# Patient Record
Sex: FEMALE | Race: OTHER | NOT HISPANIC OR LATINO | Employment: UNEMPLOYED | URBAN - METROPOLITAN AREA
[De-identification: names, ages, dates, MRNs, and addresses within clinical notes are randomized per-mention and may not be internally consistent; named-entity substitution may affect disease eponyms.]

---

## 2022-07-20 DIAGNOSIS — M25.552 LEFT HIP PAIN: Primary | ICD-10-CM

## 2022-07-22 ENCOUNTER — HOSPITAL ENCOUNTER (OUTPATIENT)
Dept: RADIOLOGY | Facility: HOSPITAL | Age: 52
Discharge: HOME | End: 2022-07-22
Attending: NURSE PRACTITIONER
Payer: MEDICAID

## 2022-07-22 DIAGNOSIS — M25.552 LEFT HIP PAIN: ICD-10-CM

## 2022-07-22 PROCEDURE — 73700 CT LOWER EXTREMITY W/O DYE: CPT | Mod: TC,LT

## 2023-04-02 ENCOUNTER — HOSPITAL ENCOUNTER (EMERGENCY)
Facility: HOSPITAL | Age: 53
Discharge: HOME | End: 2023-04-02
Attending: EMERGENCY MEDICINE
Payer: MEDICAID

## 2023-04-02 VITALS
BODY MASS INDEX: 29.23 KG/M2 | RESPIRATION RATE: 17 BRPM | HEIGHT: 63 IN | SYSTOLIC BLOOD PRESSURE: 116 MMHG | DIASTOLIC BLOOD PRESSURE: 69 MMHG | TEMPERATURE: 99 F | OXYGEN SATURATION: 90 % | HEART RATE: 66 BPM | WEIGHT: 165 LBS

## 2023-04-02 DIAGNOSIS — J18.9 COMMUNITY ACQUIRED PNEUMONIA, UNSPECIFIED LATERALITY: ICD-10-CM

## 2023-04-02 DIAGNOSIS — J44.1 COPD WITH ACUTE EXACERBATION: Primary | ICD-10-CM

## 2023-04-02 DIAGNOSIS — R06.02 SHORTNESS OF BREATH: ICD-10-CM

## 2023-04-02 LAB
ALBUMIN SERPL-MCNC: 3 G/DL (ref 3.5–5)
ALBUMIN/GLOB SERPL: 0.7 RATIO (ref 1.1–2)
ALP SERPL-CCNC: 142 UNIT/L (ref 40–150)
ALT SERPL-CCNC: 17 UNIT/L (ref 0–55)
AST SERPL-CCNC: 16 UNIT/L (ref 5–34)
BASOPHILS # BLD AUTO: 0.07 X10(3)/MCL (ref 0–0.2)
BASOPHILS NFR BLD AUTO: 0.6 %
BILIRUBIN DIRECT+TOT PNL SERPL-MCNC: 0.3 MG/DL
BUN SERPL-MCNC: 16 MG/DL (ref 9.8–20.1)
CALCIUM SERPL-MCNC: 9.6 MG/DL (ref 8.4–10.2)
CHLORIDE SERPL-SCNC: 102 MMOL/L (ref 98–107)
CO2 SERPL-SCNC: 26 MMOL/L (ref 22–29)
CREAT SERPL-MCNC: 0.8 MG/DL (ref 0.55–1.02)
EOSINOPHIL # BLD AUTO: 0.09 X10(3)/MCL (ref 0–0.9)
EOSINOPHIL NFR BLD AUTO: 0.8 %
ERYTHROCYTE [DISTWIDTH] IN BLOOD BY AUTOMATED COUNT: 14.4 % (ref 11.5–17)
FLUAV AG UPPER RESP QL IA.RAPID: NOT DETECTED
FLUBV AG UPPER RESP QL IA.RAPID: NOT DETECTED
GFR SERPLBLD CREATININE-BSD FMLA CKD-EPI: >60 MLS/MIN/1.73/M2
GLOBULIN SER-MCNC: 4.6 GM/DL (ref 2.4–3.5)
GLUCOSE SERPL-MCNC: 113 MG/DL (ref 74–100)
HCT VFR BLD AUTO: 34.7 % (ref 37–47)
HGB BLD-MCNC: 12.4 G/DL (ref 12–16)
IMM GRANULOCYTES # BLD AUTO: 0.05 X10(3)/MCL (ref 0–0.04)
IMM GRANULOCYTES NFR BLD AUTO: 0.5 %
LYMPHOCYTES # BLD AUTO: 2.35 X10(3)/MCL (ref 0.6–4.6)
LYMPHOCYTES NFR BLD AUTO: 21.8 %
MCH RBC QN AUTO: 32 PG (ref 27–31)
MCHC RBC AUTO-ENTMCNC: 35.7 G/DL (ref 33–36)
MCV RBC AUTO: 89.7 FL (ref 80–94)
MONOCYTES # BLD AUTO: 0.65 X10(3)/MCL (ref 0.1–1.3)
MONOCYTES NFR BLD AUTO: 6 %
NEUTROPHILS # BLD AUTO: 7.57 X10(3)/MCL (ref 2.1–9.2)
NEUTROPHILS NFR BLD AUTO: 70.3 %
PLATELET # BLD AUTO: 436 X10(3)/MCL (ref 130–400)
PMV BLD AUTO: 9 FL (ref 7.4–10.4)
POTASSIUM SERPL-SCNC: 3.1 MMOL/L (ref 3.5–5.1)
PROT SERPL-MCNC: 7.6 GM/DL (ref 6.4–8.3)
RBC # BLD AUTO: 3.87 X10(6)/MCL (ref 4.2–5.4)
SARS-COV-2 RNA RESP QL NAA+PROBE: NOT DETECTED
SODIUM SERPL-SCNC: 140 MMOL/L (ref 136–145)
TROPONIN I SERPL-MCNC: <0.01 NG/ML (ref 0–0.04)
WBC # SPEC AUTO: 10.8 X10(3)/MCL (ref 4.5–11.5)

## 2023-04-02 PROCEDURE — 93010 ELECTROCARDIOGRAM REPORT: CPT | Mod: ,,, | Performed by: STUDENT IN AN ORGANIZED HEALTH CARE EDUCATION/TRAINING PROGRAM

## 2023-04-02 PROCEDURE — 85025 COMPLETE CBC W/AUTO DIFF WBC: CPT | Performed by: PHYSICIAN ASSISTANT

## 2023-04-02 PROCEDURE — 84484 ASSAY OF TROPONIN QUANT: CPT | Performed by: PHYSICIAN ASSISTANT

## 2023-04-02 PROCEDURE — 94640 AIRWAY INHALATION TREATMENT: CPT

## 2023-04-02 PROCEDURE — 63600175 PHARM REV CODE 636 W HCPCS: Performed by: PHYSICIAN ASSISTANT

## 2023-04-02 PROCEDURE — 25000242 PHARM REV CODE 250 ALT 637 W/ HCPCS: Performed by: PHYSICIAN ASSISTANT

## 2023-04-02 PROCEDURE — 94761 N-INVAS EAR/PLS OXIMETRY MLT: CPT

## 2023-04-02 PROCEDURE — 93010 EKG 12-LEAD: ICD-10-PCS | Mod: ,,, | Performed by: STUDENT IN AN ORGANIZED HEALTH CARE EDUCATION/TRAINING PROGRAM

## 2023-04-02 PROCEDURE — 0240U COVID/FLU A&B PCR: CPT | Performed by: PHYSICIAN ASSISTANT

## 2023-04-02 PROCEDURE — 80053 COMPREHEN METABOLIC PANEL: CPT | Performed by: PHYSICIAN ASSISTANT

## 2023-04-02 PROCEDURE — 93005 ELECTROCARDIOGRAM TRACING: CPT

## 2023-04-02 PROCEDURE — 96374 THER/PROPH/DIAG INJ IV PUSH: CPT

## 2023-04-02 PROCEDURE — 99285 EMERGENCY DEPT VISIT HI MDM: CPT | Mod: 25

## 2023-04-02 RX ORDER — METHYLPREDNISOLONE SOD SUCC 125 MG
125 VIAL (EA) INJECTION
Status: COMPLETED | OUTPATIENT
Start: 2023-04-02 | End: 2023-04-02

## 2023-04-02 RX ORDER — DOXYCYCLINE 100 MG/1
100 CAPSULE ORAL 2 TIMES DAILY
Qty: 14 CAPSULE | Refills: 0 | Status: SHIPPED | OUTPATIENT
Start: 2023-04-02 | End: 2023-04-09

## 2023-04-02 RX ORDER — PROMETHAZINE HYDROCHLORIDE AND DEXTROMETHORPHAN HYDROBROMIDE 6.25; 15 MG/5ML; MG/5ML
5 SYRUP ORAL EVERY 6 HOURS PRN
Qty: 118 ML | Refills: 0 | Status: SHIPPED | OUTPATIENT
Start: 2023-04-02 | End: 2023-04-12

## 2023-04-02 RX ORDER — IPRATROPIUM BROMIDE AND ALBUTEROL SULFATE 2.5; .5 MG/3ML; MG/3ML
3 SOLUTION RESPIRATORY (INHALATION)
Status: COMPLETED | OUTPATIENT
Start: 2023-04-02 | End: 2023-04-02

## 2023-04-02 RX ADMIN — METHYLPREDNISOLONE SODIUM SUCCINATE 125 MG: 125 INJECTION, POWDER, FOR SOLUTION INTRAMUSCULAR; INTRAVENOUS at 12:04

## 2023-04-02 RX ADMIN — IPRATROPIUM BROMIDE AND ALBUTEROL SULFATE 3 ML: .5; 3 SOLUTION RESPIRATORY (INHALATION) at 12:04

## 2023-04-02 NOTE — DISCHARGE INSTRUCTIONS
Drink plenty of fluids. Take full course of antibiotics. Use cough medication as needed. Use home inhalers.

## 2023-04-02 NOTE — ED PROVIDER NOTES
Encounter Date: 4/2/2023       History     Chief Complaint   Patient presents with    Cough     Reports sob, dizziness, decreased appetite for last several days.  Cough for couple weeks. Denies chest pain. States she is having increased anxiety.  1/2ppd smoker.  Takes medication for anxiety.      Shortness of Breath    Dizziness     52-year-old female with history of COPD presents to ED for evaluation shortness of breath and wheezing.  Patient reports that she has cough and congestion for the past 2 weeks.  Has home inhalers but did not use this morning.  Denies any fever.  No mucus production.  Denies any chest pain. States this feels like previous COPD exacerbation.    The history is provided by the patient. No  was used.   Review of patient's allergies indicates:   Allergen Reactions    Codeine Rash     Past Medical History:   Diagnosis Date    COPD (chronic obstructive pulmonary disease)     Hypertension      History reviewed. No pertinent surgical history.  History reviewed. No pertinent family history.     Review of Systems   Constitutional:  Negative for chills, fatigue and fever.   HENT:  Positive for congestion. Negative for sore throat.    Respiratory:  Positive for cough, shortness of breath and wheezing.    Cardiovascular:  Negative for chest pain, palpitations and leg swelling.   Gastrointestinal:  Negative for abdominal pain, diarrhea, nausea and vomiting.   Genitourinary: Negative.    Musculoskeletal: Negative.    Neurological:  Negative for dizziness and light-headedness.   All other systems reviewed and are negative.    Physical Exam     Initial Vitals [04/02/23 1059]   BP Pulse Resp Temp SpO2   136/82 88 18 98.6 °F (37 °C) (!) 94 %      MAP       --         Physical Exam    Vitals reviewed.  Constitutional: She appears well-developed.   HENT:   Head: Normocephalic and atraumatic.   Right Ear: Tympanic membrane and external ear normal.   Left Ear: Tympanic membrane and external  ear normal.   Mouth/Throat: Uvula is midline, oropharynx is clear and moist and mucous membranes are normal. No trismus in the jaw. No uvula swelling. No oropharyngeal exudate, posterior oropharyngeal edema or posterior oropharyngeal erythema.   Eyes: Conjunctivae are normal. Pupils are equal, round, and reactive to light.   Neck: Neck supple.   Normal range of motion.  Cardiovascular:  Normal rate, regular rhythm and normal heart sounds.           Pulmonary/Chest: Breath sounds normal. She has no wheezes. She has no rhonchi. She has no rales.   Bilateral expiratory wheezing noted.  No retractions, tachypnea or respiratory distress noted   Abdominal: Abdomen is soft. Bowel sounds are normal. There is no abdominal tenderness. There is no rebound and no guarding.   Musculoskeletal:         General: Normal range of motion.      Cervical back: Normal range of motion and neck supple.     Neurological: She is alert and oriented to person, place, and time.   Skin: Skin is warm and dry.   Psychiatric: She has a normal mood and affect.       ED Course   Procedures  Labs Reviewed   COMPREHENSIVE METABOLIC PANEL - Abnormal; Notable for the following components:       Result Value    Potassium Level 3.1 (*)     Glucose Level 113 (*)     Albumin Level 3.0 (*)     Globulin 4.6 (*)     Albumin/Globulin Ratio 0.7 (*)     All other components within normal limits   CBC WITH DIFFERENTIAL - Abnormal; Notable for the following components:    RBC 3.87 (*)     Hct 34.7 (*)     MCH 32.0 (*)     Platelet 436 (*)     IG# 0.05 (*)     All other components within normal limits   COVID/FLU A&B PCR - Normal    Narrative:     The Xpert Xpress SARS-CoV-2/FLU/RSV plus is a rapid, multiplexed real-time PCR test intended for the simultaneous qualitative detection and differentiation of SARS-CoV-2, Influenza A, Influenza B, and respiratory syncytial virus (RSV) viral RNA in either nasopharyngeal swab or nasal swab specimens.         TROPONIN I -  Normal   CBC W/ AUTO DIFFERENTIAL    Narrative:     The following orders were created for panel order CBC auto differential.  Procedure                               Abnormality         Status                     ---------                               -----------         ------                     CBC with Differential[[id number]]        Abnormal            Final result                 Please view results for these tests on the individual orders.     EKG Readings: (Independently Interpreted)   Initial Reading: No STEMI. Rhythm: Normal Sinus Rhythm. Heart Rate: 77. Ectopy: No Ectopy. Conduction: RBBB. ST Segments: Normal ST Segments. T Waves: Normal. Clinical Impression: Normal Sinus Rhythm with RBBB Other Impression: NSR with RBBB at rate of 77, no stemi noted     Imaging Results              X-Ray Chest PA And Lateral (Final result)  Result time 04/02/23 14:16:28      Final result by Nathan Beltrán MD (04/02/23 14:16:28)                   Impression:      Right middle lobe opacities suspicious for pneumonia.  Recommend follow-up PA and lateral radiographs in 4-6 weeks to document resolution.      Electronically signed by: Nathan Beltrán  Date:    04/02/2023  Time:    14:16               Narrative:    EXAMINATION:  XR CHEST PA AND LATERAL    CLINICAL HISTORY:  Shortness of breath    COMPARISON:  5 February 2020    FINDINGS:  PA and lateral views of the chest were obtained. The heart is not significantly enlarged.  Some right middle lobe opacities are new.  No pneumothorax or significant pleural effusion.                                       Medications   methylPREDNISolone sodium succinate injection 125 mg (125 mg Intravenous Given 4/2/23 1228)   albuterol-ipratropium 2.5 mg-0.5 mg/3 mL nebulizer solution 3 mL (3 mLs Nebulization Given 4/2/23 1219)     Medical Decision Making:   Initial Assessment:   52-year-old female with history of COPD presents to ED for evaluation shortness of breath and wheezing.  Patient  reports that she has cough and congestion for the past 2 weeks.  Has home inhalers but did not use this morning.  Denies any fever.  No mucus production.  Denies any chest pain. States this feels like previous COPD exacerbation.  Differential Diagnosis:   Shortness of breath, COPD exacerbation, wheezing, viral infection, pneumonia, CAD, STEMI/NSTEMI  Independently Interpreted Test(s):   I have ordered and independently interpreted EKG Reading(s) - see prior notes  Clinical Tests:   Lab Tests: Ordered and Reviewed  The following lab test(s) were unremarkable: CBC, BMP and Troponin  Radiological Study: Reviewed and Ordered  Medical Tests: Ordered and Reviewed  ED Management:  Patient afebrile and in NAD. Mild wheezing noted on exam. Patient feeling better after duoneb and solumedrol. Patient has had cough the for the past 2 weeks. Will place on doxycyline to cover for COPD exacerbation lasting 2 weeks as well as right lower lobe pneumonia. Discussed using home nebulizer as needed. Patient O2 of 95%, not hypoxic.  Return ED precautions given.  I provided counseling to patient with regard to condition, the treatment plan, specific conditions for return, and the importance of follow up. Detailed written and verbal instructions provided to patient and she expressed a verbal understanding of the discharge instructions and overall management plan. Reiterated the importance of medication administration and safety. Advised patient to follow up with primary care provider in 3-5 days or sooner if needed.  Answered questions at this time. The patient is stable for discharge.              ED Course as of 04/02/23 1422   Sun Apr 02, 2023   1338 Patient feeling better after duoneb and solumedrol [SL]      ED Course User Index  [SL] LINDA Hylton                 Clinical Impression:   Final diagnoses:  [R06.02] Shortness of breath  [J44.1] COPD with acute exacerbation (Primary)  [J18.9] Community acquired pneumonia, unspecified  laterality        ED Disposition Condition    Discharge Stable          ED Prescriptions       Medication Sig Dispense Start Date End Date Auth. Provider    doxycycline (VIBRAMYCIN) 100 MG Cap Take 1 capsule (100 mg total) by mouth 2 (two) times daily. for 7 days 14 capsule 4/2/2023 4/9/2023 LINDA Hylton    promethazine-dextromethorphan (PROMETHAZINE-DM) 6.25-15 mg/5 mL Syrp Take 5 mLs by mouth every 6 (six) hours as needed. 118 mL 4/2/2023 4/12/2023 LINDA Hylton          Follow-up Information       Follow up With Specialties Details Why Contact Info    Barry López III, APRN-CNP Family Medicine   [address] [facility] [address] [phone]               LINDA Hylton  04/02/23 1424       LINDA Hylton  04/11/23 3567

## 2023-08-02 ENCOUNTER — HOSPITAL ENCOUNTER (OUTPATIENT)
Dept: RADIOLOGY | Facility: HOSPITAL | Age: 53
Discharge: HOME | End: 2023-08-02
Payer: MEDICAID

## 2023-08-02 DIAGNOSIS — Z12.31 ENCOUNTER FOR SCREENING MAMMOGRAM FOR BREAST CANCER: ICD-10-CM

## 2023-08-02 PROCEDURE — 77067 SCR MAMMO BI INCL CAD: CPT | Mod: TC

## 2023-08-02 PROCEDURE — 77067 SCR MAMMO BI INCL CAD: CPT | Mod: 26,,, | Performed by: STUDENT IN AN ORGANIZED HEALTH CARE EDUCATION/TRAINING PROGRAM

## 2023-08-02 PROCEDURE — 77063 MAMMO DIGITAL SCREENING BILAT WITH TOMO: ICD-10-PCS | Mod: 26,,, | Performed by: STUDENT IN AN ORGANIZED HEALTH CARE EDUCATION/TRAINING PROGRAM

## 2023-08-02 PROCEDURE — 77063 BREAST TOMOSYNTHESIS BI: CPT | Mod: 26,,, | Performed by: STUDENT IN AN ORGANIZED HEALTH CARE EDUCATION/TRAINING PROGRAM

## 2023-08-02 PROCEDURE — 77067 MAMMO DIGITAL SCREENING BILAT WITH TOMO: ICD-10-PCS | Mod: 26,,, | Performed by: STUDENT IN AN ORGANIZED HEALTH CARE EDUCATION/TRAINING PROGRAM

## 2023-08-08 ENCOUNTER — HOSPITAL ENCOUNTER (OUTPATIENT)
Dept: RADIOLOGY | Facility: HOSPITAL | Age: 53
Discharge: HOME | End: 2023-08-08
Attending: NURSE PRACTITIONER
Payer: MEDICAID

## 2023-08-08 DIAGNOSIS — R92.8 ABNORMAL MAMMOGRAM: ICD-10-CM

## 2023-08-08 PROCEDURE — 76642 ULTRASOUND BREAST LIMITED: CPT | Mod: TC,RT

## 2023-08-08 PROCEDURE — 76642 ULTRASOUND BREAST LIMITED: CPT | Mod: 26,RT,, | Performed by: STUDENT IN AN ORGANIZED HEALTH CARE EDUCATION/TRAINING PROGRAM

## 2023-08-08 PROCEDURE — 77065 DX MAMMO INCL CAD UNI: CPT | Mod: 26,RT,, | Performed by: STUDENT IN AN ORGANIZED HEALTH CARE EDUCATION/TRAINING PROGRAM

## 2023-08-08 PROCEDURE — 77061 MAMMO DIGITAL DIAGNOSTIC RIGHT WITH TOMO: ICD-10-PCS | Mod: 26,RT,, | Performed by: STUDENT IN AN ORGANIZED HEALTH CARE EDUCATION/TRAINING PROGRAM

## 2023-08-08 PROCEDURE — 77061 BREAST TOMOSYNTHESIS UNI: CPT | Mod: TC,RT

## 2023-08-08 PROCEDURE — 77065 MAMMO DIGITAL DIAGNOSTIC RIGHT WITH TOMO: ICD-10-PCS | Mod: 26,RT,, | Performed by: STUDENT IN AN ORGANIZED HEALTH CARE EDUCATION/TRAINING PROGRAM

## 2023-08-08 PROCEDURE — 77061 BREAST TOMOSYNTHESIS UNI: CPT | Mod: 26,RT,, | Performed by: STUDENT IN AN ORGANIZED HEALTH CARE EDUCATION/TRAINING PROGRAM

## 2023-08-08 PROCEDURE — 76642 US BREAST RIGHT LIMITED: ICD-10-PCS | Mod: 26,RT,, | Performed by: STUDENT IN AN ORGANIZED HEALTH CARE EDUCATION/TRAINING PROGRAM

## 2023-10-24 ENCOUNTER — HOSPITAL ENCOUNTER (OUTPATIENT)
Dept: RADIOLOGY | Facility: HOSPITAL | Age: 53
Discharge: HOME | End: 2023-10-24
Attending: NURSE PRACTITIONER
Payer: MEDICAID

## 2023-10-24 DIAGNOSIS — R10.9 ABDOMINAL PAIN: ICD-10-CM

## 2023-10-24 PROCEDURE — 74019 RADEX ABDOMEN 2 VIEWS: CPT | Mod: TC

## 2023-12-25 ENCOUNTER — HOSPITAL ENCOUNTER (EMERGENCY)
Facility: HOSPITAL | Age: 53
Discharge: HOME | End: 2023-12-25
Attending: EMERGENCY MEDICINE
Payer: MEDICAID

## 2023-12-25 VITALS
WEIGHT: 189.63 LBS | OXYGEN SATURATION: 98 % | HEIGHT: 64 IN | HEART RATE: 88 BPM | BODY MASS INDEX: 32.37 KG/M2 | RESPIRATION RATE: 18 BRPM | DIASTOLIC BLOOD PRESSURE: 79 MMHG | TEMPERATURE: 99 F | SYSTOLIC BLOOD PRESSURE: 121 MMHG

## 2023-12-25 DIAGNOSIS — R10.13 EPIGASTRIC PAIN: ICD-10-CM

## 2023-12-25 DIAGNOSIS — J10.1 INFLUENZA A: Primary | ICD-10-CM

## 2023-12-25 LAB
ALBUMIN SERPL-MCNC: 3.4 G/DL (ref 3.5–5)
ALBUMIN/GLOB SERPL: 1 RATIO (ref 1.1–2)
ALP SERPL-CCNC: 83 UNIT/L (ref 40–150)
ALT SERPL-CCNC: 14 UNIT/L (ref 0–55)
AST SERPL-CCNC: 13 UNIT/L (ref 5–34)
BASOPHILS # BLD AUTO: 0.07 X10(3)/MCL
BASOPHILS NFR BLD AUTO: 1 %
BILIRUB SERPL-MCNC: 0.3 MG/DL
BUN SERPL-MCNC: 13 MG/DL (ref 9.8–20.1)
CALCIUM SERPL-MCNC: 8.6 MG/DL (ref 8.4–10.2)
CHLORIDE SERPL-SCNC: 106 MMOL/L (ref 98–107)
CO2 SERPL-SCNC: 23 MMOL/L (ref 22–29)
CREAT SERPL-MCNC: 0.86 MG/DL (ref 0.55–1.02)
EOSINOPHIL # BLD AUTO: 0.04 X10(3)/MCL (ref 0–0.9)
EOSINOPHIL NFR BLD AUTO: 0.6 %
ERYTHROCYTE [DISTWIDTH] IN BLOOD BY AUTOMATED COUNT: 14.2 % (ref 11.5–17)
FLUAV AG UPPER RESP QL IA.RAPID: DETECTED
FLUBV AG UPPER RESP QL IA.RAPID: NOT DETECTED
GFR SERPLBLD CREATININE-BSD FMLA CKD-EPI: >60 MLS/MIN/1.73/M2
GLOBULIN SER-MCNC: 3.5 GM/DL (ref 2.4–3.5)
GLUCOSE SERPL-MCNC: 89 MG/DL (ref 74–100)
HCT VFR BLD AUTO: 37.1 % (ref 37–47)
HGB BLD-MCNC: 12.3 G/DL (ref 12–16)
IMM GRANULOCYTES # BLD AUTO: 0.03 X10(3)/MCL (ref 0–0.04)
IMM GRANULOCYTES NFR BLD AUTO: 0.4 %
LIPASE SERPL-CCNC: 11 U/L
LYMPHOCYTES # BLD AUTO: 0.9 X10(3)/MCL (ref 0.6–4.6)
LYMPHOCYTES NFR BLD AUTO: 13.2 %
MCH RBC QN AUTO: 29.7 PG (ref 27–31)
MCHC RBC AUTO-ENTMCNC: 33.2 G/DL (ref 33–36)
MCV RBC AUTO: 89.6 FL (ref 80–94)
MONOCYTES # BLD AUTO: 0.44 X10(3)/MCL (ref 0.1–1.3)
MONOCYTES NFR BLD AUTO: 6.5 %
NEUTROPHILS # BLD AUTO: 5.33 X10(3)/MCL (ref 2.1–9.2)
NEUTROPHILS NFR BLD AUTO: 78.3 %
PLATELET # BLD AUTO: 273 X10(3)/MCL (ref 130–400)
PMV BLD AUTO: 9 FL (ref 7.4–10.4)
POTASSIUM SERPL-SCNC: 4 MMOL/L (ref 3.5–5.1)
PROT SERPL-MCNC: 6.9 GM/DL (ref 6.4–8.3)
RBC # BLD AUTO: 4.14 X10(6)/MCL (ref 4.2–5.4)
RSV A 5' UTR RNA NPH QL NAA+PROBE: NOT DETECTED
SARS-COV-2 RNA RESP QL NAA+PROBE: NOT DETECTED
SODIUM SERPL-SCNC: 139 MMOL/L (ref 136–145)
TROPONIN I SERPL-MCNC: <0.01 NG/ML (ref 0–0.04)
WBC # SPEC AUTO: 6.81 X10(3)/MCL (ref 4.5–11.5)

## 2023-12-25 PROCEDURE — 96374 THER/PROPH/DIAG INJ IV PUSH: CPT

## 2023-12-25 PROCEDURE — 85025 COMPLETE CBC W/AUTO DIFF WBC: CPT | Performed by: EMERGENCY MEDICINE

## 2023-12-25 PROCEDURE — 93010 EKG 12-LEAD: ICD-10-PCS | Mod: ,,, | Performed by: INTERNAL MEDICINE

## 2023-12-25 PROCEDURE — 93005 ELECTROCARDIOGRAM TRACING: CPT

## 2023-12-25 PROCEDURE — 84484 ASSAY OF TROPONIN QUANT: CPT | Performed by: EMERGENCY MEDICINE

## 2023-12-25 PROCEDURE — 93010 ELECTROCARDIOGRAM REPORT: CPT | Mod: ,,, | Performed by: INTERNAL MEDICINE

## 2023-12-25 PROCEDURE — 80053 COMPREHEN METABOLIC PANEL: CPT | Performed by: EMERGENCY MEDICINE

## 2023-12-25 PROCEDURE — 63600175 PHARM REV CODE 636 W HCPCS: Performed by: EMERGENCY MEDICINE

## 2023-12-25 PROCEDURE — 96375 TX/PRO/DX INJ NEW DRUG ADDON: CPT

## 2023-12-25 PROCEDURE — 99284 EMERGENCY DEPT VISIT MOD MDM: CPT | Mod: 25

## 2023-12-25 PROCEDURE — 83690 ASSAY OF LIPASE: CPT | Performed by: EMERGENCY MEDICINE

## 2023-12-25 PROCEDURE — 0241U COVID/RSV/FLU A&B PCR: CPT | Performed by: EMERGENCY MEDICINE

## 2023-12-25 RX ORDER — DROPERIDOL 2.5 MG/ML
2.5 INJECTION, SOLUTION INTRAMUSCULAR; INTRAVENOUS
Status: COMPLETED | OUTPATIENT
Start: 2023-12-25 | End: 2023-12-25

## 2023-12-25 RX ORDER — ONDANSETRON 8 MG/1
8 TABLET, ORALLY DISINTEGRATING ORAL EVERY 6 HOURS PRN
Qty: 20 TABLET | Refills: 0 | Status: SHIPPED | OUTPATIENT
Start: 2023-12-25 | End: 2023-12-30

## 2023-12-25 RX ORDER — KETOROLAC TROMETHAMINE 30 MG/ML
30 INJECTION, SOLUTION INTRAMUSCULAR; INTRAVENOUS
Status: COMPLETED | OUTPATIENT
Start: 2023-12-25 | End: 2023-12-25

## 2023-12-25 RX ORDER — OSELTAMIVIR PHOSPHATE 75 MG/1
75 CAPSULE ORAL 2 TIMES DAILY
Qty: 10 CAPSULE | Refills: 0 | Status: SHIPPED | OUTPATIENT
Start: 2023-12-25 | End: 2023-12-30

## 2023-12-25 RX ADMIN — KETOROLAC TROMETHAMINE 30 MG: 30 INJECTION, SOLUTION INTRAMUSCULAR at 06:12

## 2023-12-25 RX ADMIN — DROPERIDOL 2.5 MG: 2.5 INJECTION, SOLUTION INTRAMUSCULAR; INTRAVENOUS at 06:12

## 2023-12-25 NOTE — ED PROVIDER NOTES
"Encounter Date: 12/25/2023       History     Chief Complaint   Patient presents with    Generalized Body Aches     Pt c/o body aches, dizziness, nausea, headache, fever, and emptiness in stomach.     The history is provided by the patient.   Illness   The current episode started several days ago. The problem has been unchanged. Nothing relieves the symptoms. Nothing aggravates the symptoms. Associated symptoms include nausea, muscle aches and cough. Pertinent negatives include no fever, no sore throat, no shortness of breath and no rash. The cough is Non-productive. Nothing relieves the cough. Nothing worsens the cough.   States that her stomach feels "empty" and that it is "tormenting" her.    Review of patient's allergies indicates:   Allergen Reactions    Codeine Rash     Past Medical History:   Diagnosis Date    COPD (chronic obstructive pulmonary disease)     Hypertension      History reviewed. No pertinent surgical history.  History reviewed. No pertinent family history.     Review of Systems   Constitutional:  Negative for fever.   HENT:  Negative for sore throat.    Respiratory:  Positive for cough. Negative for shortness of breath.    Cardiovascular:  Negative for chest pain.   Gastrointestinal:  Positive for nausea.   Genitourinary:  Negative for dysuria.   Musculoskeletal:  Negative for back pain.   Skin:  Negative for rash.   Neurological:  Negative for weakness.   Hematological:  Does not bruise/bleed easily.       Physical Exam     Initial Vitals [12/25/23 0556]   BP Pulse Resp Temp SpO2   128/71 85 20 99.3 °F (37.4 °C) 97 %      MAP       --         Physical Exam    Nursing note and vitals reviewed.  Constitutional: She appears well-developed and well-nourished.   Smells heavily of cigarette smoke   HENT:   Head: Normocephalic and atraumatic.   Right Ear: External ear normal.   Left Ear: External ear normal.   Eyes: Conjunctivae and EOM are normal. Pupils are equal, round, and reactive to light. "   Neck: Neck supple.   Normal range of motion.  Cardiovascular:  Normal rate, regular rhythm and intact distal pulses.           Murmur heard.  Systolic murmur is present with a grade of 2/6.  Pulmonary/Chest: Breath sounds normal.   Abdominal: Abdomen is soft. Bowel sounds are normal.   Musculoskeletal:         General: Normal range of motion.      Cervical back: Normal range of motion and neck supple.     Neurological: She is alert and oriented to person, place, and time. GCS score is 15. GCS eye subscore is 4. GCS verbal subscore is 5. GCS motor subscore is 6.   Skin: Skin is warm and dry. Capillary refill takes less than 2 seconds.   Psychiatric: She has a normal mood and affect. Her behavior is normal. Judgment and thought content normal.         ED Course   Procedures  Labs Reviewed   COMPREHENSIVE METABOLIC PANEL - Abnormal; Notable for the following components:       Result Value    Albumin Level 3.4 (*)     Albumin/Globulin Ratio 1.0 (*)     All other components within normal limits   COVID/RSV/FLU A&B PCR - Abnormal; Notable for the following components:    Influenza A PCR Detected (*)     All other components within normal limits    Narrative:     The Xpert Xpress SARS-CoV-2/FLU/RSV plus is a rapid, multiplexed real-time PCR test intended for the simultaneous qualitative detection and differentiation of SARS-CoV-2, Influenza A, Influenza B, and respiratory syncytial virus (RSV) viral RNA in either nasopharyngeal swab or nasal swab specimens.         CBC WITH DIFFERENTIAL - Abnormal; Notable for the following components:    RBC 4.14 (*)     All other components within normal limits   LIPASE - Normal   TROPONIN I - Normal   CBC W/ AUTO DIFFERENTIAL    Narrative:     The following orders were created for panel order CBC auto differential.  Procedure                               Abnormality         Status                     ---------                               -----------         ------                      CBC with Differential[[id number]]        Abnormal            Final result                 Please view results for these tests on the individual orders.     EKG Readings: (Independently Interpreted)   Initial Reading: No STEMI. Previous EKG: Compared with most recent EKG Previous EKG Date: 04/02/23. Rhythm: Normal Sinus Rhythm. Heart Rate: 90. Ectopy: No Ectopy. Conduction: RBBB. ST Segments: Normal ST Segments. T Waves Flipped: III. Axis: Normal. Clinical Impression: Normal Sinus Rhythm with RBBB Other Impression: unchanged from prior EKG       Imaging Results    None          Medications   droPERidol injection 2.5 mg (2.5 mg Intravenous Given 12/25/23 0636)   ketorolac injection 30 mg (30 mg Intravenous Given 12/25/23 0636)     Medical Decision Making  Amount and/or Complexity of Data Reviewed  Labs: ordered. Decision-making details documented in ED Course.  ECG/medicine tests: ordered and independent interpretation performed. Decision-making details documented in ED Course.    Risk  Prescription drug management.    Differential includes:  flu, COVID, viral URI, gastritis, pancreatitis, GB disease, PUD, PNA, bronchitis, anxiety,  Will obtain EKG, CBC, CMP, lipase, troponin, flu/COVID/RSV swab and give ketorolac and droperidol for HA/nausea.                                  Clinical Impression:  Final diagnoses:  [R10.13] Epigastric pain  [J10.1] Influenza A (Primary)          ED Disposition Condition    Discharge Stable          ED Prescriptions       Medication Sig Dispense Start Date End Date Auth. Provider    oseltamivir (TAMIFLU) 75 MG capsule Take 1 capsule (75 mg total) by mouth 2 (two) times daily. for 5 days 10 capsule 12/25/2023 12/30/2023 Mao Powell MD    ondansetron (ZOFRAN-ODT) 8 MG TbDL Take 1 tablet (8 mg total) by mouth every 6 (six) hours as needed (nausea). 20 tablet 12/25/2023 12/30/2023 Mao Powell MD          Follow-up Information       Follow up With Specialties  Details Why Contact Info    Follow up with your primary MD in 3-5 days if not improved.  Return to ED for worsening symptoms.                 Mao Powell MD  12/25/23 1168

## 2023-12-27 ENCOUNTER — HOSPITAL ENCOUNTER (EMERGENCY)
Facility: HOSPITAL | Age: 53
Discharge: HOME | End: 2023-12-28
Attending: EMERGENCY MEDICINE
Payer: MEDICAID

## 2023-12-27 DIAGNOSIS — R11.2 NAUSEA AND VOMITING, UNSPECIFIED VOMITING TYPE: ICD-10-CM

## 2023-12-27 DIAGNOSIS — R10.13 EPIGASTRIC PAIN: Primary | ICD-10-CM

## 2023-12-27 PROCEDURE — 99284 EMERGENCY DEPT VISIT MOD MDM: CPT

## 2023-12-27 RX ORDER — ONDANSETRON 2 MG/ML
8 INJECTION INTRAMUSCULAR; INTRAVENOUS
Status: COMPLETED | OUTPATIENT
Start: 2023-12-28 | End: 2023-12-27

## 2023-12-27 RX ADMIN — ONDANSETRON 8 MG: 2 INJECTION INTRAMUSCULAR; INTRAVENOUS at 11:12

## 2023-12-28 VITALS
TEMPERATURE: 99 F | SYSTOLIC BLOOD PRESSURE: 124 MMHG | HEIGHT: 64 IN | HEART RATE: 81 BPM | OXYGEN SATURATION: 95 % | BODY MASS INDEX: 31.35 KG/M2 | DIASTOLIC BLOOD PRESSURE: 71 MMHG | WEIGHT: 183.63 LBS | RESPIRATION RATE: 17 BRPM

## 2023-12-28 LAB
BASOPHILS # BLD AUTO: 0.03 X10(3)/MCL
BASOPHILS NFR BLD AUTO: 0.4 %
EOSINOPHIL # BLD AUTO: 0.09 X10(3)/MCL (ref 0–0.9)
EOSINOPHIL NFR BLD AUTO: 1.1 %
ERYTHROCYTE [DISTWIDTH] IN BLOOD BY AUTOMATED COUNT: 14.1 % (ref 11.5–17)
HCT VFR BLD AUTO: 36.9 % (ref 37–47)
HGB BLD-MCNC: 12.3 G/DL (ref 12–16)
IMM GRANULOCYTES # BLD AUTO: 0.03 X10(3)/MCL (ref 0–0.04)
IMM GRANULOCYTES NFR BLD AUTO: 0.4 %
LYMPHOCYTES # BLD AUTO: 1.48 X10(3)/MCL (ref 0.6–4.6)
LYMPHOCYTES NFR BLD AUTO: 18 %
MCH RBC QN AUTO: 29.7 PG (ref 27–31)
MCHC RBC AUTO-ENTMCNC: 33.3 G/DL (ref 33–36)
MCV RBC AUTO: 89.1 FL (ref 80–94)
MONOCYTES # BLD AUTO: 0.76 X10(3)/MCL (ref 0.1–1.3)
MONOCYTES NFR BLD AUTO: 9.3 %
NEUTROPHILS # BLD AUTO: 5.81 X10(3)/MCL (ref 2.1–9.2)
NEUTROPHILS NFR BLD AUTO: 70.8 %
PLATELET # BLD AUTO: 238 X10(3)/MCL (ref 130–400)
PMV BLD AUTO: 9.3 FL (ref 7.4–10.4)
RBC # BLD AUTO: 4.14 X10(6)/MCL (ref 4.2–5.4)
WBC # SPEC AUTO: 8.2 X10(3)/MCL (ref 4.5–11.5)

## 2023-12-28 PROCEDURE — 96372 THER/PROPH/DIAG INJ SC/IM: CPT | Performed by: EMERGENCY MEDICINE

## 2023-12-28 PROCEDURE — 85025 COMPLETE CBC W/AUTO DIFF WBC: CPT | Performed by: EMERGENCY MEDICINE

## 2023-12-28 PROCEDURE — 63600175 PHARM REV CODE 636 W HCPCS: Performed by: EMERGENCY MEDICINE

## 2023-12-28 RX ORDER — PANTOPRAZOLE SODIUM 40 MG/1
40 TABLET, DELAYED RELEASE ORAL DAILY
Qty: 30 TABLET | Refills: 2 | Status: SHIPPED | OUTPATIENT
Start: 2023-12-28 | End: 2024-03-27

## 2023-12-28 RX ORDER — SUCRALFATE 1 G/10ML
1 SUSPENSION ORAL
Qty: 560 ML | Refills: 0 | Status: SHIPPED | OUTPATIENT
Start: 2023-12-28 | End: 2024-01-11

## 2023-12-28 NOTE — ED PROVIDER NOTES
Encounter Date: 12/27/2023       History     Chief Complaint   Patient presents with    Nausea     Pt c/o nausea x 1 week and diarrhea starting today.     The history is provided by the patient.   Nausea  This is a new problem. The current episode started more than 2 days ago. Associated symptoms include abdominal pain. Pertinent negatives include no chest pain and no shortness of breath. Nothing aggravates the symptoms. Nothing relieves the symptoms.   Seen here 12/25 for similar symptoms.  Tested + for influenza.  Taking ibuprofen, Tums, and Pepto-Bismol for stomach.  Concerned she is bleeding to death due to black stool.    Review of patient's allergies indicates:   Allergen Reactions    Codeine Rash     Past Medical History:   Diagnosis Date    COPD (chronic obstructive pulmonary disease)     Hypertension      History reviewed. No pertinent surgical history.  History reviewed. No pertinent family history.     Review of Systems   Constitutional:  Negative for fever.   HENT:  Negative for sore throat.    Respiratory:  Negative for shortness of breath.    Cardiovascular:  Negative for chest pain.   Gastrointestinal:  Positive for abdominal pain and nausea.   Genitourinary:  Negative for dysuria.   Musculoskeletal:  Negative for back pain.   Skin:  Negative for rash.   Neurological:  Negative for weakness.   Hematological:  Does not bruise/bleed easily.       Physical Exam     Initial Vitals [12/27/23 2345]   BP Pulse Resp Temp SpO2   126/70 87 20 98.5 °F (36.9 °C) (!) 93 %      MAP       --         Physical Exam    Nursing note and vitals reviewed.  Constitutional: She appears well-developed and well-nourished.   HENT:   Head: Normocephalic and atraumatic.   Right Ear: External ear normal.   Left Ear: External ear normal.   Eyes: Conjunctivae and EOM are normal. Pupils are equal, round, and reactive to light.   Neck: Neck supple.   Normal range of motion.  Cardiovascular:  Normal rate, regular rhythm, normal heart  sounds and intact distal pulses.           Pulmonary/Chest: Breath sounds normal.   Abdominal: Abdomen is soft. Bowel sounds are normal.     There is no rebound and no guarding.   Musculoskeletal:         General: Normal range of motion.      Cervical back: Normal range of motion and neck supple.     Neurological: She is alert and oriented to person, place, and time. GCS score is 15. GCS eye subscore is 4. GCS verbal subscore is 5. GCS motor subscore is 6.   Skin: Skin is warm and dry. Capillary refill takes less than 2 seconds.   Psychiatric: Her behavior is normal. Judgment and thought content normal. Her mood appears anxious.         ED Course   Procedures  Labs Reviewed   CBC WITH DIFFERENTIAL - Abnormal; Notable for the following components:       Result Value    RBC 4.14 (*)     Hct 36.9 (*)     All other components within normal limits   CBC W/ AUTO DIFFERENTIAL    Narrative:     The following orders were created for panel order CBC auto differential.  Procedure                               Abnormality         Status                     ---------                               -----------         ------                     CBC with Differential[[id number]]       Abnormal            Final result                 Please view results for these tests on the individual orders.          Imaging Results    None          Medications   ondansetron injection 8 mg (8 mg Intramuscular Given 12/27/23 7164)     Medical Decision Making  Amount and/or Complexity of Data Reviewed  Labs: ordered. Decision-making details documented in ED Course.    Risk  Prescription drug management.    Differential includes:  gastritis, PUD, anxiety, medication side effects, GB disease, pancreatitis, IBS, IBD.  She had a full work up 2 days ago - LFT's and lipase were WNL.  Will give IM Zofran and obtain CBC to calm her fears.  Black stool likely from Pepto-Bismol use.  Anxiety appears to be contributing as well.                                   Clinical Impression:  Final diagnoses:  [R10.13] Epigastric pain (Primary)  [R11.2] Nausea and vomiting, unspecified vomiting type          ED Disposition Condition    Discharge Stable          ED Prescriptions       Medication Sig Dispense Start Date End Date Auth. Provider    pantoprazole (PROTONIX) 40 MG tablet Take 1 tablet (40 mg total) by mouth once daily. 30 tablet 12/28/2023 3/27/2024 Mao Powell MD    sucralfate (CARAFATE) 100 mg/mL suspension Take 10 mLs (1 g total) by mouth 4 (four) times daily before meals and nightly. for 14 days 560 mL 12/28/2023 1/11/2024 Mao Powell MD          Follow-up Information       Follow up With Specialties Details Why Contact Info    Follow up with your primary MD in 3-5 days if not improved.  Return to ED for worsening symptoms.                 Mao Powell MD  12/28/23 0031

## 2024-01-02 ENCOUNTER — HOSPITAL ENCOUNTER (EMERGENCY)
Facility: HOSPITAL | Age: 54
Discharge: HOME | End: 2024-01-02
Attending: INTERNAL MEDICINE
Payer: MEDICAID

## 2024-01-02 VITALS
DIASTOLIC BLOOD PRESSURE: 72 MMHG | TEMPERATURE: 98 F | BODY MASS INDEX: 30.73 KG/M2 | RESPIRATION RATE: 16 BRPM | SYSTOLIC BLOOD PRESSURE: 133 MMHG | HEIGHT: 64 IN | HEART RATE: 63 BPM | WEIGHT: 180 LBS | OXYGEN SATURATION: 94 %

## 2024-01-02 DIAGNOSIS — R10.11 RUQ ABDOMINAL PAIN: Primary | ICD-10-CM

## 2024-01-02 DIAGNOSIS — R11.0 NAUSEA: ICD-10-CM

## 2024-01-02 DIAGNOSIS — J10.1 INFLUENZA A: Primary | ICD-10-CM

## 2024-01-02 LAB
ALBUMIN SERPL-MCNC: 2.7 G/DL (ref 3.5–5)
ALBUMIN/GLOB SERPL: 0.6 RATIO (ref 1.1–2)
ALP SERPL-CCNC: 106 UNIT/L (ref 40–150)
ALT SERPL-CCNC: 17 UNIT/L (ref 0–55)
APPEARANCE UR: CLEAR
AST SERPL-CCNC: 15 UNIT/L (ref 5–34)
BACTERIA #/AREA URNS AUTO: ABNORMAL /HPF
BASOPHILS # BLD AUTO: 0.05 X10(3)/MCL
BASOPHILS NFR BLD AUTO: 0.4 %
BILIRUB SERPL-MCNC: 0.4 MG/DL
BILIRUB UR QL STRIP.AUTO: ABNORMAL
BUN SERPL-MCNC: 7 MG/DL (ref 9.8–20.1)
CALCIUM SERPL-MCNC: 9.2 MG/DL (ref 8.4–10.2)
CHLORIDE SERPL-SCNC: 103 MMOL/L (ref 98–107)
CO2 SERPL-SCNC: 29 MMOL/L (ref 22–29)
COLOR UR AUTO: YELLOW
CREAT SERPL-MCNC: 0.75 MG/DL (ref 0.55–1.02)
EOSINOPHIL # BLD AUTO: 0.07 X10(3)/MCL (ref 0–0.9)
EOSINOPHIL NFR BLD AUTO: 0.6 %
ERYTHROCYTE [DISTWIDTH] IN BLOOD BY AUTOMATED COUNT: 13.8 % (ref 11.5–17)
GFR SERPLBLD CREATININE-BSD FMLA CKD-EPI: >60 MLS/MIN/1.73/M2
GLOBULIN SER-MCNC: 4.3 GM/DL (ref 2.4–3.5)
GLUCOSE SERPL-MCNC: 99 MG/DL (ref 74–100)
GLUCOSE UR QL STRIP.AUTO: NEGATIVE
HCT VFR BLD AUTO: 33.3 % (ref 37–47)
HGB BLD-MCNC: 10.9 G/DL (ref 12–16)
IMM GRANULOCYTES # BLD AUTO: 0.12 X10(3)/MCL (ref 0–0.04)
IMM GRANULOCYTES NFR BLD AUTO: 1.1 %
INFLUENZA A (OHS): POSITIVE
INFLUENZA B (OHS): NEGATIVE
KETONES UR QL STRIP.AUTO: ABNORMAL
LEUKOCYTE ESTERASE UR QL STRIP.AUTO: NEGATIVE
LYMPHOCYTES # BLD AUTO: 3.4 X10(3)/MCL (ref 0.6–4.6)
LYMPHOCYTES NFR BLD AUTO: 30.5 %
MCH RBC QN AUTO: 29.4 PG (ref 27–31)
MCHC RBC AUTO-ENTMCNC: 32.7 G/DL (ref 33–36)
MCV RBC AUTO: 89.8 FL (ref 80–94)
MONOCYTES # BLD AUTO: 0.98 X10(3)/MCL (ref 0.1–1.3)
MONOCYTES NFR BLD AUTO: 8.8 %
MUCOUS THREADS URNS QL MICRO: ABNORMAL /LPF
NEUTROPHILS # BLD AUTO: 6.53 X10(3)/MCL (ref 2.1–9.2)
NEUTROPHILS NFR BLD AUTO: 58.6 %
NITRITE UR QL STRIP.AUTO: NEGATIVE
PH UR STRIP.AUTO: 7 [PH]
PLATELET # BLD AUTO: 338 X10(3)/MCL (ref 130–400)
PMV BLD AUTO: 9.2 FL (ref 7.4–10.4)
POTASSIUM SERPL-SCNC: 3.4 MMOL/L (ref 3.5–5.1)
PROT SERPL-MCNC: 7 GM/DL (ref 6.4–8.3)
PROT UR QL STRIP.AUTO: ABNORMAL
RBC # BLD AUTO: 3.71 X10(6)/MCL (ref 4.2–5.4)
RBC #/AREA URNS AUTO: ABNORMAL /HPF
RBC UR QL AUTO: NEGATIVE
SARS-COV-2 RDRP RESP QL NAA+PROBE: NEGATIVE
SODIUM SERPL-SCNC: 142 MMOL/L (ref 136–145)
SP GR UR STRIP.AUTO: 1.02 (ref 1–1.03)
SQUAMOUS #/AREA URNS AUTO: ABNORMAL /HPF
UROBILINOGEN UR STRIP-ACNC: 1
WBC # SPEC AUTO: 11.15 X10(3)/MCL (ref 4.5–11.5)
WBC #/AREA URNS AUTO: ABNORMAL /HPF

## 2024-01-02 PROCEDURE — 80053 COMPREHEN METABOLIC PANEL: CPT | Performed by: INTERNAL MEDICINE

## 2024-01-02 PROCEDURE — 87635 SARS-COV-2 COVID-19 AMP PRB: CPT | Performed by: INTERNAL MEDICINE

## 2024-01-02 PROCEDURE — 87502 INFLUENZA DNA AMP PROBE: CPT | Performed by: INTERNAL MEDICINE

## 2024-01-02 PROCEDURE — 81003 URINALYSIS AUTO W/O SCOPE: CPT | Performed by: INTERNAL MEDICINE

## 2024-01-02 PROCEDURE — 99284 EMERGENCY DEPT VISIT MOD MDM: CPT | Mod: 25

## 2024-01-02 PROCEDURE — 25000003 PHARM REV CODE 250: Performed by: INTERNAL MEDICINE

## 2024-01-02 PROCEDURE — 85025 COMPLETE CBC W/AUTO DIFF WBC: CPT | Performed by: INTERNAL MEDICINE

## 2024-01-02 RX ORDER — ONDANSETRON 4 MG/1
8 TABLET, ORALLY DISINTEGRATING ORAL
Status: COMPLETED | OUTPATIENT
Start: 2024-01-02 | End: 2024-01-02

## 2024-01-02 RX ORDER — ONDANSETRON HYDROCHLORIDE 8 MG/1
8 TABLET, FILM COATED ORAL EVERY 8 HOURS PRN
Qty: 12 TABLET | Refills: 0 | Status: SHIPPED | OUTPATIENT
Start: 2024-01-02 | End: 2024-05-28

## 2024-01-02 RX ADMIN — ONDANSETRON 8 MG: 4 TABLET, ORALLY DISINTEGRATING ORAL at 10:01

## 2024-01-02 NOTE — ED PROVIDER NOTES
01/02/2024         10:33 AM    Source of History:  History obtained from patient and son.     Chief complaint:  From Nurse Triage:  Nausea (C/o being nauseated for 1 month. Diagnosed with flu on 12/25/23. States her doctor called her today and stated she needs a CT of her abd to see why she is nauseated. Pt denies any abd pain)    HISTORY OF PRESENT ILLNES:  Brie Hidalgo is a 53 y.o. female  has a past medical history of COPD (chronic obstructive pulmonary disease) and Hypertension. presenting with Nausea (C/o being nauseated for 1 month. Diagnosed with flu on 12/25/23. States her doctor called her today and stated she needs a CT of her abd to see why she is nauseated. Pt denies any abd pain) I discussed the patient that CTs not indicated for nausea alone.  Patient still has a gallbladder so an ultrasound will be ordered to rule out cholelithiasis.      REVIEW OF SYSTEMS:   Constitutional symptoms:     Skin symptoms:      Eye symptoms:     ENMT symptoms:      Respiratory symptoms:      Cardiovascular symptoms:     Gastrointestinal symptoms:      Genitourinary symptoms:     Musculoskeletal symptoms:      Neurologic symptoms:      Psychiatric symptoms:               Additional review of systems information: Patient Denies Any Other Complaints.    All Other Systems Reviewed With Patient And Negative.    ALLEGIES:  Review of patient's allergies indicates:   Allergen Reactions    Codeine Rash       MEDICINE LIST:  Current Outpatient Medications   Medication Instructions    ondansetron (ZOFRAN) 8 mg, Oral, Every 8 hours PRN    pantoprazole (PROTONIX) 40 mg, Oral, Daily    sucralfate (CARAFATE) 1 g, Oral, Before meals & nightly        PMH:  As per HPI and below:    Reviewed and updated in chart.    PAST MEDICAL HISTORY:  Past Medical History:   Diagnosis Date    COPD (chronic obstructive pulmonary disease)     Hypertension         PAST SURGICAL HISTORY:  No past surgical history on file.    SOCIAL HISTORY:  Social  History     Tobacco Use    Smoking status: Every Day     Types: Cigarettes    Smokeless tobacco: Never       FAMILY HISTORY:  No family history on file.     PROBLEM LIST:  There is no problem list on file for this patient.       PHYSICAL EXAM:      ED Triage Vitals [01/02/24 0933]   /72   Pulse 74   Resp 16   Temp 97.8 °F (36.6 °C)   SpO2 95 %        Vital Signs: Reviewed As In Chart.  General:  Alert, No Cardiorespiratory Distress Noted.   Eye:  Pupils equal and reactive to light and accomodation. Extraocular Movements Are Intact.   ENT: Mucus membranes are moist.   Cardiovascular:  Regular Rate And Rhythm     Respiratory:  Clear to auscultation bilaterally    Gastrointestinal:  Soft, Non Distended, Non Tenderness, Normal Bowel Sounds.    Neurological:  Alert And Oriented To Person, Place, Time, And Situation, Normal Motor Observed, Normal Speech Observed.  Musculoskeletal:  No Gross Deformity Noted.     Psychiatric:  Cooperative.      ED WORKUP FOR MEDICAL DECISION MAKING:    ED ORDERS:  Orders Placed This Encounter   Procedures    Rapid Influenza A/B    US Abdomen Limited (Gallbladder)    COVID-19 Rapid Screening    Urinalysis, Reflex to Urine Culture    Urinalysis, Microscopic    CBC auto differential    Comprehensive metabolic panel    CBC with Differential       ED MEDICINES:  Medications   ondansetron disintegrating tablet 8 mg (8 mg Oral Given 1/2/24 1058)                ED LABS ORDERED AND REVIEWED:  Admission on 01/02/2024   Component Date Value Ref Range Status    Influenza A 01/02/2024 Positive (A)  Negative Final    Influenza B 01/02/2024 Negative  Negative Final    SARS COV-2 MOLECULAR 01/02/2024 Negative  Negative Final    Color, UA 01/02/2024 Yellow  Yellow, Light-Yellow, Dark Yellow, Melinda, Straw Final    Appearance, UA 01/02/2024 Clear  Clear Final    Specific Gravity, UA 01/02/2024 1.020  1.005 - 1.030 Final    pH, UA 01/02/2024 7.0  5.0 - 8.5 Final    Protein, UA 01/02/2024 2+ (A)  Negative  Final    Glucose, UA 01/02/2024 Negative  Negative, Normal Final    Ketones, UA 01/02/2024 Trace (A)  Negative Final    Blood, UA 01/02/2024 Negative  Negative Final    Bilirubin, UA 01/02/2024 1+ (A)  Negative Final    Urobilinogen, UA 01/02/2024 1.0  0.2, 1.0, Normal Final    Nitrites, UA 01/02/2024 Negative  Negative Final    Leukocyte Esterase, UA 01/02/2024 Negative  Negative Final    Bacteria, UA 01/02/2024 Few (A)  None Seen, Rare, Occasional /HPF Final    Mucous, UA 01/02/2024 Small (A)  None Seen /LPF Final    RBC, UA 01/02/2024 0-2  None Seen, 0-2, 3-5, 0-5 /HPF Final    WBC, UA 01/02/2024 None Seen  None Seen, 0-2, 3-5, 0-5 /HPF Final    Squamous Epithelial Cells, UA 01/02/2024 Few (A)  None Seen, Rare, Occasional, Occ /HPF Final    Sodium Level 01/02/2024 142  136 - 145 mmol/L Final    Potassium Level 01/02/2024 3.4 (L)  3.5 - 5.1 mmol/L Final    Chloride 01/02/2024 103  98 - 107 mmol/L Final    Carbon Dioxide 01/02/2024 29  22 - 29 mmol/L Final    Glucose Level 01/02/2024 99  74 - 100 mg/dL Final    Blood Urea Nitrogen 01/02/2024 7.0 (L)  9.8 - 20.1 mg/dL Final    Creatinine 01/02/2024 0.75  0.55 - 1.02 mg/dL Final    Calcium Level Total 01/02/2024 9.2  8.4 - 10.2 mg/dL Final    Protein Total 01/02/2024 7.0  6.4 - 8.3 gm/dL Final    Albumin Level 01/02/2024 2.7 (L)  3.5 - 5.0 g/dL Final    Globulin 01/02/2024 4.3 (H)  2.4 - 3.5 gm/dL Final    Albumin/Globulin Ratio 01/02/2024 0.6 (L)  1.1 - 2.0 ratio Final    Bilirubin Total 01/02/2024 0.4  <=1.5 mg/dL Final    Alkaline Phosphatase 01/02/2024 106  40 - 150 unit/L Final    Alanine Aminotransferase 01/02/2024 17  0 - 55 unit/L Final    Aspartate Aminotransferase 01/02/2024 15  5 - 34 unit/L Final    eGFR 01/02/2024 >60  mls/min/1.73/m2 Final    WBC 01/02/2024 11.15  4.50 - 11.50 x10(3)/mcL Final    RBC 01/02/2024 3.71 (L)  4.20 - 5.40 x10(6)/mcL Final    Hgb 01/02/2024 10.9 (L)  12.0 - 16.0 g/dL Final    Hct 01/02/2024 33.3 (L)  37.0 - 47.0 % Final    MCV  01/02/2024 89.8  80.0 - 94.0 fL Final    MCH 01/02/2024 29.4  27.0 - 31.0 pg Final    MCHC 01/02/2024 32.7 (L)  33.0 - 36.0 g/dL Final    RDW 01/02/2024 13.8  11.5 - 17.0 % Final    Platelet 01/02/2024 338  130 - 400 x10(3)/mcL Final    MPV 01/02/2024 9.2  7.4 - 10.4 fL Final    Neut % 01/02/2024 58.6  % Final    Lymph % 01/02/2024 30.5  % Final    Mono % 01/02/2024 8.8  % Final    Eos % 01/02/2024 0.6  % Final    Basophil % 01/02/2024 0.4  % Final    Lymph # 01/02/2024 3.40  0.6 - 4.6 x10(3)/mcL Final    Neut # 01/02/2024 6.53  2.1 - 9.2 x10(3)/mcL Final    Mono # 01/02/2024 0.98  0.1 - 1.3 x10(3)/mcL Final    Eos # 01/02/2024 0.07  0 - 0.9 x10(3)/mcL Final    Baso # 01/02/2024 0.05  <=0.2 x10(3)/mcL Final    IG# 01/02/2024 0.12 (H)  0 - 0.04 x10(3)/mcL Final    IG% 01/02/2024 1.1  % Final       RADIOLOGY STUDIES ORDERED AND REVIEWED:  Imaging Results              US Abdomen Limited (Gallbladder) (Final result)  Result time 01/02/24 11:58:57      Final result by Teto Damon MD (01/02/24 11:58:57)                   Impression:      1. Mild hepatomegaly      Electronically signed by: Teto Damon  Date:    01/02/2024  Time:    11:58               Narrative:    EXAMINATION:  US ABDOMEN LIMITED    CLINICAL HISTORY:  , Nausea.    COMPARISON:  None available    FINDINGS:  The exam is limited to the right upper abdomen. Multiple sonographic images reveal the pancreas to be within normal limits.  The IVC is grossly normal in size. The gallbladder  is unremarkable. Bishop sign is negative. The common bile duct is not dilated. The liver is normal in size. The hepatic parenchyma is grossly within normal limits. No free fluid collection is seen. The right kidney is unremarkable.                                      MEDICAL DECISION MAKING:    Brie Hidalgo is 53 y.o. female who  has a past medical history of COPD (chronic obstructive pulmonary disease) and Hypertension. arrives in ER with c/o Nausea (C/o being  nauseated for 1 month. Diagnosed with flu on 12/25/23. States her doctor called her today and stated she needs a CT of her abd to see why she is nauseated. Pt denies any abd pain)      Reviewed Nurses Note. Reviewed Vital Signs.     Reviewed Pertinent old records, History and updated as necessary.    Vitals:    01/02/24 1103   BP:    Pulse: 65   Resp:    Temp:         Medical Decision Making  Differential diagnosis includes but is not limited to gastroenteritis, cholelithiasis, dysfunctional gallbladder, peptic ulcer disease.    Amount and/or Complexity of Data Reviewed  Labs: ordered.     Details: Labs unremarkable except for influenza a  Radiology: ordered and independent interpretation performed.     Details: I reviewed the ultrasound report and viewed the images and I agree with radiologist's reading of hepatomegaly otherwise unremarkable.    Risk  Prescription drug management.                        PROCEDURES PERFORMED IN ED:  Procedures    DIAGNOSTIC IMPRESSION:        ICD-10-CM ICD-9-CM   1. Influenza A  J10.1 487.1   2. Nausea  R11.0 787.02         ED Disposition Condition    Discharge Stable               Medication List        START taking these medications      ondansetron 8 MG tablet  Commonly known as: ZOFRAN  Take 1 tablet (8 mg total) by mouth every 8 (eight) hours as needed for Nausea.            ASK your doctor about these medications      pantoprazole 40 MG tablet  Commonly known as: PROTONIX  Take 1 tablet (40 mg total) by mouth once daily.     sucralfate 100 mg/mL suspension  Commonly known as: CARAFATE  Take 10 mLs (1 g total) by mouth 4 (four) times daily before meals and nightly. for 14 days               Where to Get Your Medications        These medications were sent to [facility] [address] [facility]  [address] [place] [id number]      Phone: [phone]   ondansetron 8 MG tablet               ED Prescriptions       Medication Sig Dispense  Start Date End Date Auth. Provider    ondansetron (ZOFRAN) 8 MG tablet Take 1 tablet (8 mg total) by mouth every 8 (eight) hours as needed for Nausea. 12 tablet 1/2/2024 -- Jadon Gray MD          Follow-up Information    None           Jadon Gray MD  01/02/24 4500

## 2024-01-03 ENCOUNTER — HOSPITAL ENCOUNTER (OUTPATIENT)
Dept: RADIOLOGY | Facility: HOSPITAL | Age: 54
Discharge: HOME | End: 2024-01-03
Attending: NURSE PRACTITIONER
Payer: MEDICAID

## 2024-01-03 DIAGNOSIS — R10.11 RUQ ABDOMINAL PAIN: ICD-10-CM

## 2024-01-03 PROCEDURE — A9537 TC99M MEBROFENIN: HCPCS

## 2024-01-20 ENCOUNTER — HOSPITAL ENCOUNTER (EMERGENCY)
Facility: HOSPITAL | Age: 54
Discharge: HOME | End: 2024-01-20
Attending: EMERGENCY MEDICINE
Payer: MEDICAID

## 2024-01-20 VITALS
WEIGHT: 177.63 LBS | DIASTOLIC BLOOD PRESSURE: 93 MMHG | SYSTOLIC BLOOD PRESSURE: 157 MMHG | RESPIRATION RATE: 18 BRPM | HEART RATE: 119 BPM | HEIGHT: 63 IN | OXYGEN SATURATION: 98 % | BODY MASS INDEX: 31.47 KG/M2

## 2024-01-20 DIAGNOSIS — N39.0 URINARY TRACT INFECTION WITHOUT HEMATURIA, SITE UNSPECIFIED: Primary | ICD-10-CM

## 2024-01-20 LAB
APPEARANCE UR: CLEAR
BACTERIA #/AREA URNS AUTO: ABNORMAL /HPF
BILIRUB UR QL STRIP.AUTO: NEGATIVE
COLOR UR AUTO: YELLOW
GLUCOSE UR QL STRIP.AUTO: NEGATIVE
KETONES UR QL STRIP.AUTO: NEGATIVE
LEUKOCYTE ESTERASE UR QL STRIP.AUTO: NEGATIVE
MUCOUS THREADS URNS QL MICRO: ABNORMAL /LPF
NITRITE UR QL STRIP.AUTO: POSITIVE
PH UR STRIP.AUTO: 6 [PH]
PROT UR QL STRIP.AUTO: ABNORMAL
RBC #/AREA URNS AUTO: ABNORMAL /HPF
RBC UR QL AUTO: NEGATIVE
SP GR UR STRIP.AUTO: 1.02 (ref 1–1.03)
SQUAMOUS #/AREA URNS AUTO: ABNORMAL /HPF
UROBILINOGEN UR STRIP-ACNC: 1
WBC #/AREA URNS AUTO: ABNORMAL /HPF

## 2024-01-20 PROCEDURE — 99284 EMERGENCY DEPT VISIT MOD MDM: CPT

## 2024-01-20 PROCEDURE — 81003 URINALYSIS AUTO W/O SCOPE: CPT | Performed by: EMERGENCY MEDICINE

## 2024-01-20 RX ORDER — PHENAZOPYRIDINE HYDROCHLORIDE 200 MG/1
200 TABLET, FILM COATED ORAL 3 TIMES DAILY PRN
Qty: 10 TABLET | Refills: 0 | Status: SHIPPED | OUTPATIENT
Start: 2024-01-20 | End: 2024-01-30

## 2024-01-20 RX ORDER — CEPHALEXIN 500 MG/1
500 CAPSULE ORAL EVERY 8 HOURS
Qty: 15 CAPSULE | Refills: 0 | Status: SHIPPED | OUTPATIENT
Start: 2024-01-20 | End: 2024-01-25

## 2024-01-20 NOTE — ED PROVIDER NOTES
Encounter Date: 2024       History     Chief Complaint   Patient presents with    Dysuria     Pt c/o suprapubic pressure and urinary frequency. Pt did take azo.      The patient presents with 1 day of suprapubic pressure, dysuria, and urinary frequency.  No fever.  No vomiting.  No flank pain.  She is generally in good health and has had no recent urinary tract infection.  She is pending to get her gallbladder out within the next week or 2.         Review of patient's allergies indicates:   Allergen Reactions    Codeine Rash     Past Medical History:   Diagnosis Date    COPD (chronic obstructive pulmonary disease)     Hypertension      Past Surgical History:   Procedure Laterality Date     SECTION       No family history on file.  Social History     Tobacco Use    Smoking status: Every Day     Types: Cigarettes    Smokeless tobacco: Never   Substance Use Topics    Alcohol use: Never    Drug use: Never     Review of Systems   All other systems reviewed and are negative.      Physical Exam     Initial Vitals [24 1004]   BP Pulse Resp Temp SpO2   (!) 157/93 (!) 119 18 -- 98 %      MAP       --         Physical Exam    Constitutional:   Vital signs reviewed.  Nursing note reviewed.    General:  The patient is awake and alert, appropriate and interactive.    Eyes: Conjunctiva are clear.  Corneas appear normal.  EOMI.  ENT: Face, head, external nose, and ears are normal-appearing.  The oropharynx appears normal.  The uvula is midline.  The posterior pharyngeal elements are symmetric.  Voice is normal.  Neck: The neck is nontender and supple with normal range of motion.  Back: The back appears normal with full range of motion.  Respiratory: The respiratory effort is normal.  The lungs are clear to auscultation.  Cardiovascular: Heart sounds are normal.  No murmur or rub.  The rate is normal.  The rhythm is normal.  Peripheral pulses are normal and equal bilaterally.  No edema is present.  Capillary  refill is less than 3 seconds.  GI: The abdomen is soft, nondistended, without mass.  There is mild suprapubic tenderness to palpation.  No rebound or guarding.  Bowel sounds are normal.  The liver and spleen are nonpalpable.  Musculoskeletal: Range of motion of all joints appears normal without pain or tenderness.  Skin: There is no rash.  Neurologic: No focal deficits are noted.           ED Course   Procedures  Labs Reviewed   URINALYSIS, REFLEX TO URINE CULTURE - Abnormal; Notable for the following components:       Result Value    Protein, UA 1+ (*)     Nitrites, UA Positive (*)     All other components within normal limits   URINALYSIS, MICROSCOPIC - Abnormal; Notable for the following components:    Bacteria, UA Moderate (*)     Mucous, UA Small (*)     Squamous Epithelial Cells, UA Moderate (*)     All other components within normal limits          Imaging Results    None          Medications - No data to display  Medical Decision Making                                    Clinical Impression:  Final diagnoses:  [N39.0] Urinary tract infection without hematuria, site unspecified (Primary)          ED Disposition Condition    Discharge Stable          ED Prescriptions       Medication Sig Dispense Start Date End Date Auth. Provider    cephALEXin (KEFLEX) 500 MG capsule Take 1 capsule (500 mg total) by mouth every 8 (eight) hours. for 5 days 15 capsule 1/20/2024 1/25/2024 Nathan Franco MD    phenazopyridine (PYRIDIUM) 200 MG tablet Take 1 tablet (200 mg total) by mouth 3 (three) times daily as needed for Pain. 10 tablet 1/20/2024 1/30/2024 Nathan Franco MD          Follow-up Information       Follow up With Specialties Details Why Contact Info    Lisa Yang FNP Family Medicine  Follow-up in 3-4 days for recheck. [address] [address]  [phone]               Nathan Franco MD  01/20/24 2323

## 2024-04-27 ENCOUNTER — HOSPITAL ENCOUNTER (OUTPATIENT)
Facility: HOSPITAL | Age: 54
LOS: 1 days | Discharge: HOME | End: 2024-04-28
Attending: STUDENT IN AN ORGANIZED HEALTH CARE EDUCATION/TRAINING PROGRAM | Admitting: INTERNAL MEDICINE
Payer: MEDICAID

## 2024-04-27 DIAGNOSIS — R29.898 ARM WEAKNESS: Primary | ICD-10-CM

## 2024-04-27 DIAGNOSIS — R55 NEAR SYNCOPE: ICD-10-CM

## 2024-04-27 DIAGNOSIS — Z01.810 PREOP CARDIOVASCULAR EXAM: ICD-10-CM

## 2024-04-27 DIAGNOSIS — F17.200 SMOKER: ICD-10-CM

## 2024-04-27 DIAGNOSIS — R26.81 GAIT INSTABILITY: ICD-10-CM

## 2024-04-27 DIAGNOSIS — R29.898 BILATERAL ARM WEAKNESS: ICD-10-CM

## 2024-04-27 DIAGNOSIS — G95.20 CERVICAL CORD COMPRESSION WITH MYELOPATHY: ICD-10-CM

## 2024-04-27 DIAGNOSIS — F41.9 ANXIETY: ICD-10-CM

## 2024-04-27 DIAGNOSIS — R06.02 SHORTNESS OF BREATH: ICD-10-CM

## 2024-04-27 LAB
ABORH RETYPE: NORMAL
ALBUMIN SERPL-MCNC: 3.2 G/DL (ref 3.5–5)
ALBUMIN/GLOB SERPL: 0.9 RATIO (ref 1.1–2)
ALP SERPL-CCNC: 138 UNIT/L (ref 40–150)
ALT SERPL-CCNC: 31 UNIT/L (ref 0–55)
AST SERPL-CCNC: 18 UNIT/L (ref 5–34)
BASOPHILS # BLD AUTO: 0.08 X10(3)/MCL
BASOPHILS NFR BLD AUTO: 0.7 %
BILIRUB SERPL-MCNC: 0.6 MG/DL
BUN SERPL-MCNC: 23.5 MG/DL (ref 9.8–20.1)
CALCIUM SERPL-MCNC: 8.7 MG/DL (ref 8.4–10.2)
CHLORIDE SERPL-SCNC: 102 MMOL/L (ref 98–107)
CO2 SERPL-SCNC: 27 MMOL/L (ref 22–29)
CREAT SERPL-MCNC: 1.02 MG/DL (ref 0.55–1.02)
EOSINOPHIL # BLD AUTO: 0.17 X10(3)/MCL (ref 0–0.9)
EOSINOPHIL NFR BLD AUTO: 1.4 %
ERYTHROCYTE [DISTWIDTH] IN BLOOD BY AUTOMATED COUNT: 14.6 % (ref 11.5–17)
GFR SERPLBLD CREATININE-BSD FMLA CKD-EPI: >60 MLS/MIN/1.73/M2
GLOBULIN SER-MCNC: 3.6 GM/DL (ref 2.4–3.5)
GLUCOSE SERPL-MCNC: 130 MG/DL (ref 74–100)
GROUP & RH: NORMAL
HCT VFR BLD AUTO: 43.8 % (ref 37–47)
HGB BLD-MCNC: 14.8 G/DL (ref 12–16)
IMM GRANULOCYTES # BLD AUTO: 0.15 X10(3)/MCL (ref 0–0.04)
IMM GRANULOCYTES NFR BLD AUTO: 1.3 %
INDIRECT COOMBS: NORMAL
INR PPP: 1
LYMPHOCYTES # BLD AUTO: 3.79 X10(3)/MCL (ref 0.6–4.6)
LYMPHOCYTES NFR BLD AUTO: 31.8 %
MCH RBC QN AUTO: 30.1 PG (ref 27–31)
MCHC RBC AUTO-ENTMCNC: 33.8 G/DL (ref 33–36)
MCV RBC AUTO: 89 FL (ref 80–94)
MONOCYTES # BLD AUTO: 0.83 X10(3)/MCL (ref 0.1–1.3)
MONOCYTES NFR BLD AUTO: 7 %
NEUTROPHILS # BLD AUTO: 6.9 X10(3)/MCL (ref 2.1–9.2)
NEUTROPHILS NFR BLD AUTO: 57.8 %
NRBC BLD AUTO-RTO: 0 %
OHS QRS DURATION: 130 MS
OHS QTC CALCULATION: 495 MS
PLATELET # BLD AUTO: 370 X10(3)/MCL (ref 130–400)
PMV BLD AUTO: 9.1 FL (ref 7.4–10.4)
POTASSIUM SERPL-SCNC: 3.3 MMOL/L (ref 3.5–5.1)
PROT SERPL-MCNC: 6.8 GM/DL (ref 6.4–8.3)
PROTHROMBIN TIME: 12.9 SECONDS (ref 12.5–14.5)
RBC # BLD AUTO: 4.92 X10(6)/MCL (ref 4.2–5.4)
SODIUM SERPL-SCNC: 137 MMOL/L (ref 136–145)
SPECIMEN OUTDATE: NORMAL
TROPONIN I SERPL-MCNC: <0.01 NG/ML (ref 0–0.04)
TROPONIN I SERPL-MCNC: <0.01 NG/ML (ref 0–0.04)
WBC # SPEC AUTO: 11.92 X10(3)/MCL (ref 4.5–11.5)

## 2024-04-27 PROCEDURE — 93005 ELECTROCARDIOGRAM TRACING: CPT

## 2024-04-27 PROCEDURE — G0378 HOSPITAL OBSERVATION PER HR: HCPCS

## 2024-04-27 PROCEDURE — 84484 ASSAY OF TROPONIN QUANT: CPT | Performed by: NURSE PRACTITIONER

## 2024-04-27 PROCEDURE — 93010 ELECTROCARDIOGRAM REPORT: CPT | Mod: ,,, | Performed by: INTERNAL MEDICINE

## 2024-04-27 PROCEDURE — 99285 EMERGENCY DEPT VISIT HI MDM: CPT | Mod: 25

## 2024-04-27 PROCEDURE — 25000003 PHARM REV CODE 250: Performed by: INTERNAL MEDICINE

## 2024-04-27 PROCEDURE — 21400001 HC TELEMETRY ROOM

## 2024-04-27 PROCEDURE — 85610 PROTHROMBIN TIME: CPT | Performed by: STUDENT IN AN ORGANIZED HEALTH CARE EDUCATION/TRAINING PROGRAM

## 2024-04-27 PROCEDURE — 63600175 PHARM REV CODE 636 W HCPCS: Performed by: STUDENT IN AN ORGANIZED HEALTH CARE EDUCATION/TRAINING PROGRAM

## 2024-04-27 PROCEDURE — 80053 COMPREHEN METABOLIC PANEL: CPT | Performed by: NURSE PRACTITIONER

## 2024-04-27 PROCEDURE — 86850 RBC ANTIBODY SCREEN: CPT | Performed by: STUDENT IN AN ORGANIZED HEALTH CARE EDUCATION/TRAINING PROGRAM

## 2024-04-27 PROCEDURE — 11000001 HC ACUTE MED/SURG PRIVATE ROOM

## 2024-04-27 PROCEDURE — 96360 HYDRATION IV INFUSION INIT: CPT

## 2024-04-27 PROCEDURE — 84484 ASSAY OF TROPONIN QUANT: CPT | Performed by: INTERNAL MEDICINE

## 2024-04-27 PROCEDURE — 85025 COMPLETE CBC W/AUTO DIFF WBC: CPT | Performed by: NURSE PRACTITIONER

## 2024-04-27 RX ORDER — CYCLOBENZAPRINE HCL 10 MG
10 TABLET ORAL 3 TIMES DAILY PRN
Status: ON HOLD | COMMUNITY
End: 2024-04-28 | Stop reason: HOSPADM

## 2024-04-27 RX ORDER — ATORVASTATIN CALCIUM 40 MG/1
40 TABLET, FILM COATED ORAL NIGHTLY
Status: DISCONTINUED | OUTPATIENT
Start: 2024-04-28 | End: 2024-04-28 | Stop reason: HOSPADM

## 2024-04-27 RX ORDER — HYDRALAZINE HYDROCHLORIDE 25 MG/1
25 TABLET, FILM COATED ORAL 3 TIMES DAILY
Status: DISCONTINUED | OUTPATIENT
Start: 2024-04-28 | End: 2024-04-28 | Stop reason: HOSPADM

## 2024-04-27 RX ORDER — HYDROCHLOROTHIAZIDE 12.5 MG/1
12.5 TABLET ORAL DAILY
Status: ON HOLD | COMMUNITY
End: 2024-04-28 | Stop reason: HOSPADM

## 2024-04-27 RX ORDER — HYDROXYZINE PAMOATE 25 MG/1
25 CAPSULE ORAL 2 TIMES DAILY
COMMUNITY

## 2024-04-27 RX ORDER — TALC
6 POWDER (GRAM) TOPICAL NIGHTLY PRN
Status: DISCONTINUED | OUTPATIENT
Start: 2024-04-27 | End: 2024-04-28 | Stop reason: HOSPADM

## 2024-04-27 RX ORDER — PROMETHAZINE HYDROCHLORIDE 25 MG/1
25 TABLET ORAL 3 TIMES DAILY PRN
Status: ON HOLD | COMMUNITY
End: 2024-04-28 | Stop reason: HOSPADM

## 2024-04-27 RX ORDER — ATORVASTATIN CALCIUM 40 MG/1
40 TABLET, FILM COATED ORAL NIGHTLY
COMMUNITY

## 2024-04-27 RX ORDER — SERTRALINE HYDROCHLORIDE 100 MG/1
100 TABLET, FILM COATED ORAL NIGHTLY
COMMUNITY

## 2024-04-27 RX ORDER — ACETAMINOPHEN 325 MG/1
650 TABLET ORAL EVERY 8 HOURS PRN
Status: DISCONTINUED | OUTPATIENT
Start: 2024-04-27 | End: 2024-04-28 | Stop reason: HOSPADM

## 2024-04-27 RX ORDER — CEFDINIR 300 MG/1
300 CAPSULE ORAL 2 TIMES DAILY
Status: ON HOLD | COMMUNITY
End: 2024-04-28 | Stop reason: HOSPADM

## 2024-04-27 RX ORDER — CARVEDILOL 12.5 MG/1
25 TABLET ORAL 2 TIMES DAILY
Status: DISCONTINUED | OUTPATIENT
Start: 2024-04-28 | End: 2024-04-28 | Stop reason: HOSPADM

## 2024-04-27 RX ORDER — SODIUM CHLORIDE 0.9 % (FLUSH) 0.9 %
10 SYRINGE (ML) INJECTION
Status: DISCONTINUED | OUTPATIENT
Start: 2024-04-27 | End: 2024-04-28 | Stop reason: HOSPADM

## 2024-04-27 RX ORDER — SERTRALINE HYDROCHLORIDE 50 MG/1
100 TABLET, FILM COATED ORAL NIGHTLY
Status: DISCONTINUED | OUTPATIENT
Start: 2024-04-28 | End: 2024-04-28 | Stop reason: HOSPADM

## 2024-04-27 RX ORDER — HYDROXYZINE PAMOATE 25 MG/1
25 CAPSULE ORAL 2 TIMES DAILY PRN
Status: DISCONTINUED | OUTPATIENT
Start: 2024-04-28 | End: 2024-04-28 | Stop reason: HOSPADM

## 2024-04-27 RX ORDER — HYDRALAZINE HYDROCHLORIDE 25 MG/1
25 TABLET, FILM COATED ORAL 3 TIMES DAILY
COMMUNITY

## 2024-04-27 RX ORDER — KETOROLAC TROMETHAMINE 30 MG/ML
15 INJECTION, SOLUTION INTRAMUSCULAR; INTRAVENOUS EVERY 6 HOURS PRN
Status: DISCONTINUED | OUTPATIENT
Start: 2024-04-27 | End: 2024-04-27

## 2024-04-27 RX ORDER — QUETIAPINE FUMARATE 25 MG/1
50 TABLET, FILM COATED ORAL NIGHTLY
Status: DISCONTINUED | OUTPATIENT
Start: 2024-04-27 | End: 2024-04-28 | Stop reason: HOSPADM

## 2024-04-27 RX ORDER — IBUPROFEN 800 MG/1
800 TABLET ORAL 3 TIMES DAILY PRN
Status: ON HOLD | COMMUNITY
End: 2024-05-16 | Stop reason: HOSPADM

## 2024-04-27 RX ORDER — OMEPRAZOLE 40 MG/1
40 CAPSULE, DELAYED RELEASE ORAL EVERY MORNING
COMMUNITY

## 2024-04-27 RX ORDER — DOCUSATE SODIUM 100 MG/1
100 CAPSULE, LIQUID FILLED ORAL 2 TIMES DAILY
Status: DISCONTINUED | OUTPATIENT
Start: 2024-04-27 | End: 2024-04-28 | Stop reason: HOSPADM

## 2024-04-27 RX ORDER — ENOXAPARIN SODIUM 100 MG/ML
40 INJECTION SUBCUTANEOUS EVERY 24 HOURS
Status: DISCONTINUED | OUTPATIENT
Start: 2024-04-27 | End: 2024-04-27

## 2024-04-27 RX ORDER — CARVEDILOL 25 MG/1
25 TABLET ORAL 2 TIMES DAILY
Status: ON HOLD | COMMUNITY
End: 2024-05-16 | Stop reason: HOSPADM

## 2024-04-27 RX ORDER — QUETIAPINE FUMARATE 50 MG/1
50 TABLET, FILM COATED ORAL NIGHTLY
COMMUNITY
End: 2024-05-28

## 2024-04-27 RX ADMIN — QUETIAPINE FUMARATE 50 MG: 25 TABLET ORAL at 11:04

## 2024-04-27 RX ADMIN — SODIUM CHLORIDE, POTASSIUM CHLORIDE, SODIUM LACTATE AND CALCIUM CHLORIDE 1000 ML: 600; 310; 30; 20 INJECTION, SOLUTION INTRAVENOUS at 04:04

## 2024-04-27 NOTE — FIRST PROVIDER EVALUATION
Medical screening examination initiated.  I have conducted a focused provider triage encounter, findings are as follows:    Brief history of present illness:  54y/o F presents to the ED bilateral arm heaviness  with near syncope.  States similar symptoms on 4/19. Reports followed up with her PCP and a neurologist referral was ordered.     There were no vitals filed for this visit.    Pertinent physical exam:  AAA x 3    Brief workup plan:  Labs/EKG    Preliminary workup initiated; this workup will be continued and followed by the physician or advanced practice provider that is assigned to the patient when roomed.

## 2024-04-27 NOTE — ED PROVIDER NOTES
Encounter Date: 2024    SCRIBE #1 NOTE: I, Cecil Lynn, am scribing for, and in the presence of,  Max Andersen MD. I have scribed the following portions of the note - the EKG reading. Other sections scribed: HPI, ROS, PE.       History     Chief Complaint   Patient presents with    Medical Evaluation     Pt c/o bilateral arm heaviness x a few days. States was seen at [facility] and told she has a bulging disc in neck. Pt has upcoming appt with neurologist but states heaviness has worsened.     52 y/o female with a hx of COPD and HTN presents to the ED for pressure in her chest for the last few days. Pt reports she was seen at [facility] and was dx with a bulging disc in her neck following a CT scan. Pt notes she has been having tightness that cross her entire front chest starting from her back. She also states she has been having worsening gait issues from her leg feeling heavy and dragging behind her. She also notes she has been having lightheadedness and diaphoresis with standing and worries she is having stroke symptoms. She denies any recent fall or trauma. She denies any blurred vision or slurred speech.     The history is provided by the patient and medical records. No  was used.     Review of patient's allergies indicates:   Allergen Reactions    Codeine Rash     Past Medical History:   Diagnosis Date    COPD (chronic obstructive pulmonary disease)     Hypertension      Past Surgical History:   Procedure Laterality Date     SECTION       No family history on file.  Social History     Tobacco Use    Smoking status: Every Day     Types: Cigarettes    Smokeless tobacco: Never   Substance Use Topics    Alcohol use: Never    Drug use: Never     Review of Systems   Constitutional:  Positive for diaphoresis. Negative for chills and fever.   HENT:  Negative for congestion, drooling and sore throat.    Eyes:  Negative for pain and visual disturbance.   Respiratory:  Positive for  chest tightness. Negative for shortness of breath and wheezing.    Cardiovascular:  Negative for chest pain, palpitations and leg swelling.   Gastrointestinal:  Negative for abdominal pain, nausea and vomiting.   Genitourinary:  Negative for dysuria and hematuria.   Musculoskeletal:  Positive for gait problem.   Skin:  Negative for pallor and rash.   Neurological:  Positive for light-headedness. Negative for speech difficulty and weakness.   Hematological:  Does not bruise/bleed easily.       Physical Exam     Initial Vitals [04/27/24 1346]   BP Pulse Resp Temp SpO2   112/74 76 18 98.4 °F (36.9 °C) 97 %      MAP       --         Physical Exam    Nursing note and vitals reviewed.  Constitutional: She appears well-developed and well-nourished. She is not diaphoretic. No distress.   HENT:   Head: Normocephalic and atraumatic.   Nose: Nose normal.   Mouth/Throat: Oropharynx is clear and moist.   Eyes: EOM are normal. Pupils are equal, round, and reactive to light.   Neck: Neck supple.   Normal range of motion.  Cardiovascular:  Normal rate and regular rhythm.           No murmur heard.  Pulmonary/Chest: Breath sounds normal. No respiratory distress. She has no wheezes. She has no rales.   Abdominal: Abdomen is soft. She exhibits no distension. There is no abdominal tenderness.   Musculoskeletal:      Cervical back: Normal range of motion and neck supple.     Neurological: She is alert and oriented to person, place, and time. GCS eye subscore is 4. GCS verbal subscore is 5. GCS motor subscore is 6.   Mild weakness with extension of the right arm. Mild weakness with flexion of the right hip.    Skin: Skin is warm. Capillary refill takes less than 2 seconds. No rash noted.         ED Course   Procedures  Labs Reviewed   COMPREHENSIVE METABOLIC PANEL - Abnormal; Notable for the following components:       Result Value    Potassium Level 3.3 (*)     Glucose Level 130 (*)     Blood Urea Nitrogen 23.5 (*)     Albumin Level 3.2  (*)     Globulin 3.6 (*)     Albumin/Globulin Ratio 0.9 (*)     All other components within normal limits   CBC WITH DIFFERENTIAL - Abnormal; Notable for the following components:    WBC 11.92 (*)     IG# 0.15 (*)     All other components within normal limits   TROPONIN I - Normal   PROTIME-INR - Normal   TROPONIN I - Normal   CBC W/ AUTO DIFFERENTIAL    Narrative:     The following orders were created for panel order CBC Auto Differential.  Procedure                               Abnormality         Status                     ---------                               -----------         ------                     CBC with Differential[[id number]]       Abnormal            Final result                 Please view results for these tests on the individual orders.   URINALYSIS, REFLEX TO URINE CULTURE   TYPE & SCREEN   ABORH RETYPE     EKG Readings: (Independently Interpreted)   Initial Reading: No STEMI. Rhythm: Normal Sinus Rhythm. Heart Rate: 76. Ectopy: No Ectopy. Conduction: RBBB. ST Segments: Normal ST Segments. T Waves: Normal. Axis: Normal. Clinical Impression: Normal Sinus Rhythm Other Impression:    Done at 1342     ECG Results              EKG 12-lead (Final result)        Collection Time Result Time QRS Duration OHS QTC Calculation    04/27/24 13:42:53 04/27/24 18:11:06 130 495                     Final result by Interface, [facility] (04/27/24 18:11:17)                   Narrative:    Test Reason : R55,    Vent. Rate : 076 BPM     Atrial Rate : 076 BPM     P-R Int : 138 ms          QRS Dur : 130 ms      QT Int : 440 ms       P-R-T Axes : 066 063 024 degrees     QTc Int : 495 ms    Normal sinus rhythm  Right bundle branch block  Abnormal ECG  When compared with ECG of 25-DEC-2023 06:30,  Questionable change in The axis  T wave inversion no longer evident in Inferior leads  Confirmed by Pérez Finney MD ([id number]) on 4/27/2024 6:11:01 PM    Referred By:             Confirmed By:Pérez Finney MD                                   Imaging Results              X-Ray Chest AP Portable (Final result)  Result time 04/27/24 21:59:29      Final result by Feroz Alarcon MD (04/27/24 21:59:29)                   Impression:      NO ACUTE CARDIOPULMONARY PROCESS IDENTIFIED.      Electronically signed by: Feroz Alarcon  Date:    04/27/2024  Time:    21:59               Narrative:    EXAMINATION:  XR CHEST AP PORTABLE    CLINICAL HISTORY:  Shortness of breath    TECHNIQUE:  One view    COMPARISON:  April 2, 2023.    FINDINGS:  Cardiopericardial silhouette is within normal limits. Lungs are without dense focal or segmental consolidation, congestive process, pleural effusions or pneumothorax.                                       X-Ray Cervical Spine 5 View With Flex And Ext (Final result)  Result time 04/27/24 22:00:46      Final result by Feroz Alarcon MD (04/27/24 22:00:46)                   Impression:      Multilevel degenerative disc disease and spondylosis.      Electronically signed by: Feroz Alarcon  Date:    04/27/2024  Time:    22:00               Narrative:    EXAMINATION:  XR CERVICAL SPINE 5 VIEW WITH FLEX AND EXT    CLINICAL HISTORY:  Other symptoms and signs involving the musculoskeletal system    TECHNIQUE:  Two-view    COMPARISON:  None available    FINDINGS:  There is trace of grade 1 retrolisthesis of C3 on C4.  Cervical vertebrae stature is preserved.  There are intervertebral disc space degenerative changes which are more apparent at the level of C5-C6 and C6-C7.  Multilevel facet arthropathy.  There is no prevertebral soft tissue prominence.                                       MRI Brain Without Contrast (Final result)  Result time 04/27/24 19:28:36      Final result by Feroz Alarcon MD (04/27/24 19:28:36)                   Impression:      No acute intracranial findings identified.      Electronically signed by: Feroz Alarcon  Date:    04/27/2024  Time:    19:28               Narrative:     EXAMINATION:  MRI BRAIN WITHOUT CONTRAST    CLINICAL HISTORY:  persistent weakness;    TECHNIQUE:  Multiplanar MRI sequences were performed of the brain without contrast.    COMPARISON:  None available    FINDINGS:  Chronic microvascular ischemic changes are minimal with subtle periventricular and subcortical white matter T2 FLAIR hyperintense signals.  There are no other foci of abnormal increased or decreased signal intensity throughout the cerebral hemispheres, cerebellum, basal ganglia or brainstem. Gradient echo sequences demonstrate no evidence of de phasing artifact to suggest hemorrhagic byproducts. No evidence of diffusion restriction or ADC map signal drop out to suggest acute infarct. The sella and suprasellar areas are unremarkable.    The cerebellar tonsils are normally positioned. There is no acute intracranial hemorrhage, hydrocephalus, midline shift or mass effect. No acute extra axial fluid collections identified. The mastoid air cells are clear.                                       MRI Cervical Spine Without Contrast (Final result)  Result time 04/27/24 19:26:20      Final result by Feroz Alarcon MD (04/27/24 19:26:20)                   Impression:      1.  Severe cervical spine degenerative disc disease and spondylosis level by level discussed above.    2.  Small prevertebral fluid of indeterminate significance.      Electronically signed by: Feroz Alarcon  Date:    04/27/2024  Time:    19:26               Narrative:    EXAMINATION:  MRI CERVICAL SPINE WITHOUT CONTRAST    CLINICAL HISTORY:  Spinal stenosis, cervical;    TECHNIQUE:  Multiple MRI pulse sequences were performed of the cervical spine without contrast.    COMPARISON:  None available    FINDINGS:  Cervical vertebrae stature is preserved and there is no significant stenosis.  No acute marrow edematous signal.  There is prevertebral small fluid of indeterminate significance.  There are several indentations upon the ventral and dorsal  thecal sac by disc pathology and spondylosis.  Disc segmental analysis is given below:    At C2-C3, there is osteophyte disc complex and facet arthropathy which causes global effacement of subarachnoid space without cord compression.  There is bilateral uncovertebral and facet arthropathy which results in severe narrowing of the right neural foramen and moderate narrowing of the left neural foramen.    At C3-C4, there is global effacement of the subarachnoid space by osteophyte disc complex and facet arthropathy.  There is cervical cord compression with perhaps some atrophy.  Posterior to the mid aspect of C4 vertebral body are cervical cord bilateral small hyperintensities reflective of myelomalacia.  There is severe spondylotic narrowing of the right neural foramen and marked narrowing of the left neural foramen.    At C4-C5, there is again global obliteration of subarachnoid space by disc pathology and spondylosis.  There is slight flattening of the right posterolateral cord.  There is moderate narrowing of right neural foramen.  The left neural foramen is encroached by uncovertebral and facet arthropathy causing marked narrowing.    At C5-C6, there is central to left paracentral disc bulge which indents the ventral thecal sac without cord compression.  Osteophyte disc complex and uncovertebral arthropathy results in marked narrowing of left neural foramen.  The right neural foramen is adequate.    At C6-C7, there is slight bulging of annulus fibrosis.  Central canal is not stenosed.  There are no narrowings of the neural foramen.    At C7-T1, disc is unremarkable.  Central canal is not stenosed and there are no significant narrowings of the neural foramen                                       Medications   sodium chloride 0.9% flush 10 mL (has no administration in time range)   melatonin tablet 6 mg (has no administration in time range)   acetaminophen tablet 650 mg (has no administration in time range)    docusate sodium capsule 100 mg (100 mg Oral Not Given 4/27/24 2115)   atorvastatin tablet 40 mg (has no administration in time range)   carvediloL tablet 25 mg (has no administration in time range)   hydrALAZINE tablet 25 mg (has no administration in time range)   sertraline tablet 100 mg (has no administration in time range)   QUEtiapine tablet 50 mg (50 mg Oral Given 4/27/24 2338)   hydrOXYzine pamoate capsule 25 mg (has no administration in time range)   albuterol-ipratropium 2.5 mg-0.5 mg/3 mL nebulizer solution 3 mL (has no administration in time range)   methadone 5 mg/5 mL liquid (PEDS) 95 mg (has no administration in time range)   lactated ringers bolus 1,000 mL (0 mLs Intravenous Stopped 4/27/24 1744)     Medical Decision Making  Problems Addressed:  Arm weakness: acute illness or injury  Bilateral arm weakness: acute illness or injury  Near syncope: acute illness or injury  Shortness of breath: acute illness or injury    Amount and/or Complexity of Data Reviewed  Labs: ordered.  Radiology: ordered.  ECG/medicine tests: ordered and independent interpretation performed.     Details: Initial Reading: No STEMI. Rhythm: Normal Sinus Rhythm. Heart Rate: 76. Ectopy: No Ectopy. Conduction: RBBB. ST Segments: Normal ST Segments. T Waves: Normal. Axis: Normal. Clinical Impression: Normal Sinus Rhythm Other Impression:    Done at 1342     Risk  Decision regarding hospitalization.    Differential diagnosis (includes but is not limited to):   Spinal cord injury, cord compression, neuropathy, ACS, arrhythmia, electrolyte abnormalities, dehydration, kidney injury    MDM Narrative  53-year-old female presents for evaluation of steadily worsening bilateral arm heaviness as well as some weakness she was noticed over the past several days.  She was recently seen at outpatient emergency department where she underwent a CT scan that showed significant degenerative disease and she was referred to Neurology with plan  for an outpatient MRI.  She states she came in today because her symptoms have proceeded to worsen.  MRI of the C-spine ordered.  MRI brain ordered as well.  EKG reviewed.  Labs including troponin pending.  Pain and nausea control as needed.  IV fluid rehydration ordered.    Update:  MRI results reviewed.  Neurosurgery consulted and case discussed as well as images reviewed.  Neurosurgery recommends admission to the hospitalist service and they will follow in consult to determine further plan of care which may include operative intervention.  Patient agrees with plan of care.  Hospitalist consulted and will admit.    Dispo: Admit    My independent radiology interpretation: as above  Point of care US (independently performed and interpreted):   Decision rules/clinical scoring:     Sepsis Perfusion Assessment:     Amount and/or Complexity of Data Reviewed  Independent historian: friend    Summary of history: Story from pt's friend corresponds with pt's story  External data reviewed: notes from previous admissions, notes from previous ED visits, notes from clinic visits, prior labs, prior EKGs, prior imaging, prescription medications , and previous procedure and OR notes  Summary of data reviewed: Prior records reviewed  Risk and benefits of testing: discussed   Labs: ordered and reviewed  Radiology: ordered and independent interpretation performed (see above or ED course)  ECG/medicine tests: ordered and independent interpretation performed (see above or ED course)  Discussion of management or test interpretation with external provider(s): discussed with hospitalist physician and discussed with neurosurgery consultant   Summary of discussion: as above    Risk  Parenteral controlled substances   Drug therapy requiring intense monitoring for toxicity   Decision regarding hospitalization  Shared decision making     Critical Care  none    Data Reviewed/Counseling: I have personally reviewed the patient's vital signs,  nursing notes, and other relevant tests, information, and imaging. I had a detailed discussion regarding the historical points, exam findings, and any diagnostic results supporting the discharge diagnosis. I personally performed the history, PE, MDM and procedures as documented above and agree with the scribe's documentation.    Portions of this note were dictated using voice recognition software. Although it was reviewed for accuracy, some inherent voice recognition errors may have occurred and may be present in this document.          Scribe Attestation:   Scribe #1: I performed the above scribed service and the documentation accurately describes the services I performed. I attest to the accuracy of the note.    Attending Attestation:           Physician Attestation for Scribe:  Physician Attestation Statement for Scribe #1: I, Max Andersen MD, reviewed documentation, as scribed by Cecil Lynn in my presence, and it is both accurate and complete.             ED Course as of 04/28/24 0110   Sat Apr 27, 2024 1939 Case discussed with Neurosurgery, will evaluate the patient at bedside. []   1952 Q4h, ok for floor []   2026 Paged hospitalist: Unique Alatorre NP []      ED Course User Index  [] Cecil Lynn  [] Max Andersen MD                           Clinical Impression:  Final diagnoses:  [R55] Near syncope  [R06.02] Shortness of breath  [R29.898] Arm weakness  [R29.898] Bilateral arm weakness          ED Disposition Condition    Admit Stable                Max Andersen MD  04/28/24 0110

## 2024-04-28 VITALS
HEIGHT: 63 IN | DIASTOLIC BLOOD PRESSURE: 75 MMHG | OXYGEN SATURATION: 95 % | SYSTOLIC BLOOD PRESSURE: 114 MMHG | WEIGHT: 176 LBS | BODY MASS INDEX: 31.18 KG/M2 | TEMPERATURE: 97 F | RESPIRATION RATE: 20 BRPM | HEART RATE: 65 BPM

## 2024-04-28 PROBLEM — F17.200 SMOKER: Status: ACTIVE | Noted: 2024-04-28

## 2024-04-28 PROBLEM — F41.9 ANXIETY: Status: ACTIVE | Noted: 2024-04-28

## 2024-04-28 LAB
ALBUMIN SERPL-MCNC: 3 G/DL (ref 3.5–5)
ALBUMIN/GLOB SERPL: 0.9 RATIO (ref 1.1–2)
ALP SERPL-CCNC: 122 UNIT/L (ref 40–150)
ALT SERPL-CCNC: 29 UNIT/L (ref 0–55)
APPEARANCE UR: CLEAR
APTT PPP: 25.6 SECONDS (ref 23.2–33.7)
AST SERPL-CCNC: 19 UNIT/L (ref 5–34)
AV INDEX (PROSTH): 0.77
AV MEAN GRADIENT: 4 MMHG
AV PEAK GRADIENT: 9 MMHG
AV VELOCITY RATIO: 0.71
BACTERIA #/AREA URNS AUTO: ABNORMAL /HPF
BASOPHILS # BLD AUTO: 0.09 X10(3)/MCL
BASOPHILS NFR BLD AUTO: 0.7 %
BILIRUB SERPL-MCNC: 0.5 MG/DL
BILIRUB UR QL STRIP.AUTO: NEGATIVE
BSA FOR ECHO PROCEDURE: 1.88 M2
BUN SERPL-MCNC: 19.3 MG/DL (ref 9.8–20.1)
CALCIUM SERPL-MCNC: 8.6 MG/DL (ref 8.4–10.2)
CHLORIDE SERPL-SCNC: 101 MMOL/L (ref 98–107)
CO2 SERPL-SCNC: 27 MMOL/L (ref 22–29)
COLOR UR AUTO: ABNORMAL
CREAT SERPL-MCNC: 0.9 MG/DL (ref 0.55–1.02)
CV ECHO LV RWT: 0.38 CM
DOP CALC AO PEAK VEL: 1.47 M/S
DOP CALC AO VTI: 28.9 CM
DOP CALC LVOT PEAK VEL: 1.05 M/S
DOP CALCLVOT PEAK VEL VTI: 22.2 CM
E WAVE DECELERATION TIME: 201 MSEC
E/A RATIO: 0.83
E/E' RATIO: 8.95 M/S
ECHO LV POSTERIOR WALL: 0.84 CM (ref 0.6–1.1)
EOSINOPHIL # BLD AUTO: 0.21 X10(3)/MCL (ref 0–0.9)
EOSINOPHIL NFR BLD AUTO: 1.7 %
ERYTHROCYTE [DISTWIDTH] IN BLOOD BY AUTOMATED COUNT: 14.5 % (ref 11.5–17)
FRACTIONAL SHORTENING: 29 % (ref 28–44)
GFR SERPLBLD CREATININE-BSD FMLA CKD-EPI: >60 MLS/MIN/1.73/M2
GLOBULIN SER-MCNC: 3.5 GM/DL (ref 2.4–3.5)
GLUCOSE SERPL-MCNC: 86 MG/DL (ref 74–100)
GLUCOSE UR QL STRIP.AUTO: NORMAL
HCT VFR BLD AUTO: 41.5 % (ref 37–47)
HGB BLD-MCNC: 13.9 G/DL (ref 12–16)
IMM GRANULOCYTES # BLD AUTO: 0.13 X10(3)/MCL (ref 0–0.04)
IMM GRANULOCYTES NFR BLD AUTO: 1.1 %
INTERVENTRICULAR SEPTUM: 0.82 CM (ref 0.6–1.1)
KETONES UR QL STRIP.AUTO: NEGATIVE
LEFT ATRIUM SIZE: 2.7 CM
LEFT ATRIUM VOLUME INDEX MOD: 16.1 ML/M2
LEFT ATRIUM VOLUME MOD: 29.4 CM3
LEFT INTERNAL DIMENSION IN SYSTOLE: 3.12 CM (ref 2.1–4)
LEFT VENTRICLE DIASTOLIC VOLUME INDEX: 48.42 ML/M2
LEFT VENTRICLE DIASTOLIC VOLUME: 88.6 ML
LEFT VENTRICLE MASS INDEX: 63 G/M2
LEFT VENTRICLE SYSTOLIC VOLUME INDEX: 21 ML/M2
LEFT VENTRICLE SYSTOLIC VOLUME: 38.5 ML
LEFT VENTRICULAR INTERNAL DIMENSION IN DIASTOLE: 4.42 CM (ref 3.5–6)
LEFT VENTRICULAR MASS: 115.75 G
LEUKOCYTE ESTERASE UR QL STRIP.AUTO: 75
LV LATERAL E/E' RATIO: 7.73 M/S
LV SEPTAL E/E' RATIO: 10.63 M/S
LVOT MG: 2 MMHG
LVOT MV: 0.7 CM/S
LYMPHOCYTES # BLD AUTO: 4.09 X10(3)/MCL (ref 0.6–4.6)
LYMPHOCYTES NFR BLD AUTO: 33.2 %
MCH RBC QN AUTO: 30 PG (ref 27–31)
MCHC RBC AUTO-ENTMCNC: 33.5 G/DL (ref 33–36)
MCV RBC AUTO: 89.4 FL (ref 80–94)
MONOCYTES # BLD AUTO: 0.95 X10(3)/MCL (ref 0.1–1.3)
MONOCYTES NFR BLD AUTO: 7.7 %
MUCOUS THREADS URNS QL MICRO: ABNORMAL /LPF
MV PEAK A VEL: 1.02 M/S
MV PEAK E VEL: 0.85 M/S
NEUTROPHILS # BLD AUTO: 6.85 X10(3)/MCL (ref 2.1–9.2)
NEUTROPHILS NFR BLD AUTO: 55.6 %
NITRITE UR QL STRIP.AUTO: NEGATIVE
NRBC BLD AUTO-RTO: 0 %
OHS LV EJECTION FRACTION SIMPSONS BIPLANE MOD: 63 %
PH UR STRIP.AUTO: 6.5 [PH]
PLATELET # BLD AUTO: 310 X10(3)/MCL (ref 130–400)
PMV BLD AUTO: 9.1 FL (ref 7.4–10.4)
POTASSIUM SERPL-SCNC: 3.6 MMOL/L (ref 3.5–5.1)
PROT SERPL-MCNC: 6.5 GM/DL (ref 6.4–8.3)
PROT UR QL STRIP.AUTO: NEGATIVE
RBC # BLD AUTO: 4.64 X10(6)/MCL (ref 4.2–5.4)
RBC #/AREA URNS AUTO: ABNORMAL /HPF
RBC UR QL AUTO: NEGATIVE
SODIUM SERPL-SCNC: 138 MMOL/L (ref 136–145)
SP GR UR STRIP.AUTO: 1.02 (ref 1–1.03)
SQUAMOUS #/AREA URNS LPF: ABNORMAL /HPF
TDI LATERAL: 0.11 M/S
TDI SEPTAL: 0.08 M/S
TDI: 0.1 M/S
TRICUSPID ANNULAR PLANE SYSTOLIC EXCURSION: 1.91 CM
TROPONIN I SERPL-MCNC: <0.01 NG/ML (ref 0–0.04)
UROBILINOGEN UR STRIP-ACNC: NORMAL
WBC # SPEC AUTO: 12.32 X10(3)/MCL (ref 4.5–11.5)
WBC #/AREA URNS AUTO: ABNORMAL /HPF
Z-SCORE OF LEFT VENTRICULAR DIMENSION IN END DIASTOLE: -1.37
Z-SCORE OF LEFT VENTRICULAR DIMENSION IN END SYSTOLE: -0.03

## 2024-04-28 PROCEDURE — 84484 ASSAY OF TROPONIN QUANT: CPT | Performed by: INTERNAL MEDICINE

## 2024-04-28 PROCEDURE — 36415 COLL VENOUS BLD VENIPUNCTURE: CPT | Performed by: INTERNAL MEDICINE

## 2024-04-28 PROCEDURE — S0109 METHADONE ORAL 5MG: HCPCS | Performed by: INTERNAL MEDICINE

## 2024-04-28 PROCEDURE — 36415 COLL VENOUS BLD VENIPUNCTURE: CPT | Performed by: NEUROLOGICAL SURGERY

## 2024-04-28 PROCEDURE — 85025 COMPLETE CBC W/AUTO DIFF WBC: CPT | Performed by: INTERNAL MEDICINE

## 2024-04-28 PROCEDURE — 80053 COMPREHEN METABOLIC PANEL: CPT | Performed by: INTERNAL MEDICINE

## 2024-04-28 PROCEDURE — 81001 URINALYSIS AUTO W/SCOPE: CPT | Performed by: STUDENT IN AN ORGANIZED HEALTH CARE EDUCATION/TRAINING PROGRAM

## 2024-04-28 PROCEDURE — G0378 HOSPITAL OBSERVATION PER HR: HCPCS

## 2024-04-28 PROCEDURE — 85730 THROMBOPLASTIN TIME PARTIAL: CPT | Performed by: NEUROLOGICAL SURGERY

## 2024-04-28 PROCEDURE — 25000003 PHARM REV CODE 250: Performed by: INTERNAL MEDICINE

## 2024-04-28 PROCEDURE — 99223 1ST HOSP IP/OBS HIGH 75: CPT | Mod: ,,, | Performed by: NEUROLOGICAL SURGERY

## 2024-04-28 PROCEDURE — 25000003 PHARM REV CODE 250: Performed by: NEUROLOGICAL SURGERY

## 2024-04-28 PROCEDURE — 97162 PT EVAL MOD COMPLEX 30 MIN: CPT

## 2024-04-28 PROCEDURE — 87086 URINE CULTURE/COLONY COUNT: CPT | Performed by: STUDENT IN AN ORGANIZED HEALTH CARE EDUCATION/TRAINING PROGRAM

## 2024-04-28 RX ORDER — METHADONE HYDROCHLORIDE 40 MG/1
95 TABLET ORAL DAILY
Status: DISCONTINUED | OUTPATIENT
Start: 2024-04-28 | End: 2024-04-28

## 2024-04-28 RX ORDER — IBUPROFEN 200 MG
1 TABLET ORAL DAILY
Qty: 40 PATCH | Refills: 0 | Status: ON HOLD | OUTPATIENT
Start: 2024-04-29 | End: 2024-05-16 | Stop reason: HOSPADM

## 2024-04-28 RX ORDER — LEVOFLOXACIN 750 MG/1
750 TABLET ORAL DAILY
Qty: 5 TABLET | Refills: 0 | Status: SHIPPED | OUTPATIENT
Start: 2024-04-28 | End: 2024-05-03

## 2024-04-28 RX ORDER — METHADONE HYDROCHLORIDE 5 MG/5ML
95 SOLUTION ORAL DAILY
Status: DISCONTINUED | OUTPATIENT
Start: 2024-04-28 | End: 2024-04-28 | Stop reason: HOSPADM

## 2024-04-28 RX ORDER — METHADONE HYDROCHLORIDE 5 MG/5ML
95 SOLUTION ORAL DAILY
Status: DISCONTINUED | OUTPATIENT
Start: 2024-04-28 | End: 2024-04-28

## 2024-04-28 RX ORDER — METHADONE HYDROCHLORIDE 5 MG/5ML
95 SOLUTION ORAL WEEKLY
Status: DISCONTINUED | OUTPATIENT
Start: 2024-04-29 | End: 2024-04-28

## 2024-04-28 RX ORDER — IBUPROFEN 200 MG
1 TABLET ORAL DAILY
Status: DISCONTINUED | OUTPATIENT
Start: 2024-04-28 | End: 2024-04-28 | Stop reason: HOSPADM

## 2024-04-28 RX ORDER — IPRATROPIUM BROMIDE AND ALBUTEROL SULFATE 2.5; .5 MG/3ML; MG/3ML
3 SOLUTION RESPIRATORY (INHALATION) EVERY 4 HOURS PRN
Status: DISCONTINUED | OUTPATIENT
Start: 2024-04-28 | End: 2024-04-28 | Stop reason: HOSPADM

## 2024-04-28 RX ADMIN — HYDRALAZINE HYDROCHLORIDE 25 MG: 25 TABLET ORAL at 08:04

## 2024-04-28 RX ADMIN — CARVEDILOL 25 MG: 12.5 TABLET, FILM COATED ORAL at 08:04

## 2024-04-28 RX ADMIN — DOCUSATE SODIUM 100 MG: 100 CAPSULE, LIQUID FILLED ORAL at 08:04

## 2024-04-28 RX ADMIN — METHADONE HYDROCHLORIDE 95 MG: 5 SOLUTION ORAL at 08:04

## 2024-04-28 NOTE — PROGRESS NOTES
[facility] Medicine Progress Note        Chief Complaint: Inpatient Follow-up for B.L Upper extremities heaviness     HPI per admitting team: Patient is a 53-year-old female with a history of opioid dependence on methadone, HTN, COPD who presents to the ER with complaints of bilateral arm heaviness and tightness across her entire front chest as well as some mild gait disturbances over the last several days.  She had been seen at an outside ER few days prior with a CT showing cervical bulging discs and was referred to a neurosurgeon on an outpatient basis but due to worsening symptoms presented to the ER.    She arrived afebrile hemodynamically stable maintaining normal sats on room air and her laboratory work was unremarkable.  MRI of the cervical spine showed significant degenerative joint disease at multiple levels, MRI of the brain was unremarkable.  Neurosurgery was consulted and recommended admission for further evaluation.    Interval Hx:   Patient is laying in bed, her daughter, niece and sister are at bedside.  Patient reports she is very nervous but that's her baseline. Also reported she is starving, informed she is NPO till seen by neurosx  Patient also confirmed that she is on methadone 95 mL liquid daily, reported she goes to a methadone clinic every 2 weeks and they gave her 14 walls for her 2 weeks, she gets urine drug screen at her clinic every 2 weeks.  Today is the last bottle she has for this 2 weeks.  Informed patient will give her medicine today and then from tomorrow our pharmacy will provide to her.  Verbalized understanding  Offered her nicotine patch 14 mg as she smokes half a pack a day, had a long conversation with the pt regarding smoking cessation, family also agreed stating she has been diagnosed with COPD and yet she smokes....   Family also mentioned that numbness and tingling and heaviness is worse in bilateral upper extremities, also mentioned  that she has footdrop, gait has been abnormal for few months but lately she is dragging her feet  Case was discussed with patient's nurse on the floor.    Objective/physical exam:  General: In no acute distress, afebrile, has truncal obesity  Chest: Clear to auscultation bilaterally to the bases, no wheezing, on room air  Heart: RRR, +S1, S2, no appreciable murmur  Abdomen: Soft, nontender, BS +  MSK: Warm, no lower extremity edema, no clubbing or cyanosis  Neurologic: Alert and oriented x4, Cranial nerve II-XII intact, hand  right 4/5, rest all 3 extremities strength is 5/5    VITAL SIGNS: 24 HRS MIN & MAX LAST   Temp  Min: 97.9 °F (36.6 °C)  Max: 98.4 °F (36.9 °C) 97.9 °F (36.6 °C)   BP  Min: 98/69  Max: 127/75 127/75   Pulse  Min: 62  Max: 82  73   Resp  Min: 10  Max: 22 20   SpO2  Min: 94 %  Max: 98 % 95 %     I reviewed the labs below:  Recent Labs   Lab 04/27/24  1408 04/28/24 0239   WBC 11.92* 12.32*   RBC 4.92 4.64   HGB 14.8 13.9   HCT 43.8 41.5   MCV 89.0 89.4   MCH 30.1 30.0   MCHC 33.8 33.5   RDW 14.6 14.5    310   MPV 9.1 9.1     Recent Labs   Lab 04/27/24  1408 04/28/24  0239    138   K 3.3* 3.6   CO2 27 27   BUN 23.5* 19.3   CREATININE 1.02 0.90   CALCIUM 8.7 8.6   ALBUMIN 3.2* 3.0*   ALKPHOS 138 122   ALT 31 29   AST 18 19   BILITOT 0.6 0.5     Microbiology Results (last 7 days)       Procedure Component Value Units Date/Time    Urine culture [[id number]] Collected: 04/28/24 0233    Order Status: Sent Specimen: Urine Updated: 04/28/24 0249           See below for Radiology    Intake and Output:  No intake or output data in the 24 hours ending 04/28/24 1053    Assessment/Plan:  Cervical myelopathy/radiculopathy from DJD  HX: HTN, COPD, opioid dependence on methadone      Neurosurgery on board, note pending   Cardiology was consulted for surgical clearance, patient has been cleared for surgery, deemed low risk  Currently NPO  Echo shows EF 60-65% with grade 1 diastolic dysfunction,  normal right ventricular systolic function.  No valvular dysfunction  Analgesics as needed, continue methadone 95 mg daily, patient has last bottle for day 14, reported goes to methadone clinic every 14 days and they give her 14 day supply.  She has other 13 empty bottles of methadone in her security lock box.  Avoid opiates/narcotics  Mild leukocytosis of 12 K, urine concerning for infection but patient denied any complaints.  Will wait for urine culture  Appropriate home medication for chronic medical condition has been resumed on admission    VTE prophylaxis:  lovenox                       Patient condition:  Stable    Anticipated discharge and Disposition:  TBD        All diagnosis and differential diagnosis have been reviewed; assessment and plan has been documented; I have personally reviewed the labs and test results that are presently available; I have reviewed the patients medication list; I have reviewed the consulting providers response and recommendations. I have reviewed or attempted to review medical records based upon their availability    All of the patient's questions have been  addressed and answered. Patient's is agreeable to the above stated plan. I will continue to monitor closely and make adjustments to medical management as needed.    Portions of this note dictated using EMR integrated voice recognition software, and may be subject to voice recognition errors not corrected at proofreading. Please contact writer for clarification if needed.   _____________________________________________________________________    Nutrition Status:  -    Current Med:  Current Facility-Administered Medications   Medication Dose Route Frequency    atorvastatin  40 mg Oral QHS    carvediloL  25 mg Oral BID    docusate sodium  100 mg Oral BID    hydrALAZINE  25 mg Oral TID    methadone  95 mg Oral Daily    nicotine  1 patch Transdermal Daily    QUEtiapine  50 mg Oral Nightly    sertraline  100 mg Oral QHS      PRN  meds:    Current Facility-Administered Medications:     acetaminophen, 650 mg, Oral, Q8H PRN    albuterol-ipratropium, 3 mL, Nebulization, Q4H PRN    hydrOXYzine pamoate, 25 mg, Oral, BID PRN    melatonin, 6 mg, Oral, Nightly PRN    sodium chloride 0.9%, 10 mL, Intravenous, PRN    Continuous infusion:  Current Facility-Administered Medications   Medication Dose Route Frequency Last Rate Last Admin        Radiology:   I have personally reviewed the images and agree with radiologist report  Echo    Left Ventricle: The left ventricle is normal in size. Normal wall   thickness. There is normal systolic function with a visually estimated   ejection fraction of 60 - 65%. Grade I diastolic dysfunction.    Right Ventricle: Normal right ventricular cavity size. Systolic   function is normal.    No significant valvular dysfunction.        Zi Fan MD  Department of Hospital Medicine   [facility]   04/28/2024

## 2024-04-28 NOTE — CONSULTS
[facility]  Hospital Consultation      Reason for Consultation:  2-3 months of heaviness in bilateral arms, pain across her chest, difficulty walking, and falling episodes.  A MRI cervical spine without gadolinium demonstrates normal alignment.  There is severe stenosis with bilateral neuroforaminal narrowing at C3-4 and C4-5.  There is a left-sided disc bulge at C5-6 with left neuroforaminal narrowing.  Within the spinal cord at the C3-4 level, there is increased T2 signal, which is consistent with myelomalacia.    Consulted by: Dr. Max Andersen, ER Medicine    Date of Consultation: 4/28/2024  Date of Admission: 4/27/2024     SUBJECTIVE:     Chief Complaint:  2-3 months of heaviness in bilateral arms, difficulty walking, and falling episodes    History of Present Illness:   Ms. Hidalgo is a 53 y.o. right-handed female who has a past medical history significant for hypertension, COPD, opiate dependence on methadone, and anxiety.  She does not take any blood thinner medications.  For the last 2-3 months, the patient has been experiencing heaviness in her bilateral arms, pain across her chest, difficulty walking, and falling episodes.  Ms. Hidalgo was evaluated in an urgent care center in [place], [place] on 04/19/2024 where a CT cervical spine demonstrated normal alignment with multilevel degenerative disc disease.  Her symptoms continue to progress, so the patient presented to the ER at [facility] yesterday on 4/27/2024.  A MRI cervical spine without gadolinium demonstrated severe stenosis with bilateral neuroforaminal narrowing at C3-4 and C4-5.  There was a left-sided disc bulge at C5-6 with left neuroforaminal narrowing.  Within the spinal cord at the C3-4 level, there was increased T2 signal, which is consistent with myelomalacia.  Neurosurgery was consulted for further evaluation.      I evaluated Ms. Hidalgo in her hospital room with several family  members at the bedside.  She has not had any pain related symptoms and also has not had any recent trauma.  In addition to the feeling of heaviness in her bilateral arms, the patient has been experiencing intermittent numbness in her bilateral fingers.  She does not have any focal weakness, although the patient feels like her legs give out from under her when she walks.  Ms. Hidalgo has fallen a few times in the last few months and during this hospital stay was issued a walker after being evaluated by physical therapy.  There been no reports of bowel or bladder incontinence.    The patient started Cdefdinir 2 days ago on 2024 as prescribed by her primary care physician for a runny nose and upper respiratory infection symptoms.  She has not had any fevers.  In addition, on her admission lab work, Ms. Hidalgo was diagnosed with a urinary tract infection.      Patient Active Problem List    Diagnosis Date Noted    Bilateral arm weakness 2024     Past Medical History:   Diagnosis Date    COPD (chronic obstructive pulmonary disease)     Hypertension      Past Surgical History:   Procedure Laterality Date     SECTION         Current Facility-Administered Medications:     acetaminophen tablet 650 mg, 650 mg, Oral, Q8H PRN, Mao James MD    albuterol-ipratropium 2.5 mg-0.5 mg/3 mL nebulizer solution 3 mL, 3 mL, Nebulization, Q4H PRN, Mao James MD    atorvastatin tablet 40 mg, 40 mg, Oral, QHS, Mao James MD    carvediloL tablet 25 mg, 25 mg, Oral, BID, Mao James MD, 25 mg at 24 0836    cefTRIAXone (Rocephin) 1 g in dextrose 5 % in water (D5W) 100 mL IVPB (MB+), 1 g, Intravenous, Q24H, Zi Fan MD    docusate sodium capsule 100 mg, 100 mg, Oral, BID, Lilian Harding MD, 100 mg at 24 0836    hydrALAZINE tablet 25 mg, 25 mg, Oral, TID, Mao James MD, 25 mg at 24 0836    hydrOXYzine pamoate capsule 25 mg, 25 mg, Oral, BID  PRN, Mao James MD    melatonin tablet 6 mg, 6 mg, Oral, Nightly PRN, Mao James MD    methadone 5 mg/5 mL liquid (PEDS) 95 mg, 95 mg, Oral, Daily, Zi Fan MD, 95 mg at 04/28/24 0841    nicotine 14 mg/24 hr 1 patch, 1 patch, Transdermal, Daily, Zi Fan MD    QUEtiapine tablet 50 mg, 50 mg, Oral, Nightly, Mao James MD, 50 mg at 04/27/24 2338    sertraline tablet 100 mg, 100 mg, Oral, QHS, Mao James MD    sodium chloride 0.9% flush 10 mL, 10 mL, Intravenous, PRNJacob Christopher C, MD    Review of patient's allergies indicates:   Allergen Reactions    Codeine Rash       Social History     Tobacco Use    Smoking status: Every Day     Types: Cigarettes    Smokeless tobacco: Never   Substance Use Topics    Alcohol use: Never     Occupation: unemployed    No family history on file.    ROS:  Constitutional:  Negative for chills and fever.   HENT:  Positive for congestion and sore throat.    Eyes:  Negative for blurred vision and double vision.   Respiratory:  Positive for shortness of breath, Negative for cough.   Cardiovascular:  Positive for chest pain, Negative for  palpitations.   Gastrointestinal:  Positive for nausea, Negative for constipation, diarrhea, and vomiting.   Musculoskeletal:  Negative for back pain and neck pain.   Neurological:  Positive for sensory change, Negative for focal weakness and headaches.   Endo/Heme/Allergies:  Does not bruise/bleed easily.   Psychiatric/Behavioral:  Positive for anxiety and depression.       OBJECTIVE:     Vital Signs (Most Recent)  Temp: 97.2 °F (36.2 °C) (04/28/24 1226)  Pulse: 65 (04/28/24 1226)  Resp: 20 (04/28/24 1226)  BP: 114/75 (04/28/24 1226)  SpO2: 95 % (04/28/24 1226)  Body mass index is 31.18 kg/m².      Constitutional:   Well developed, cooperative, conversant    Body habitus:  Mildly obese    Mental Status:   GCS- 15  E-4, V-5, M-6    Opens eyes spontaneously  Oriented x 3  Follows commands in all  "extremities    Cranial nerves:  CN II: PERRL, 4 to 3 mm, brisk bilaterally  CN III, IV, and VI: extraocular movements normal, no ptosis  CN V: normal facial sensation and masseter function  CN VII: facial strength normal and symmetrical  CN VIII: hearing normal bilaterally  CN IX and X: swallowing and phonation normal  CN XI: shoulder shrug intact bilaterally  CN XII: tongue protrusion midline    Motor:  Muscle bulk: normal in all extremities  Tone: normal in all extremities    Upper extremities:  Deltoid: right 5/5; left 5/5  Biceps: right 5/5; left 5/5  Triceps: right 5/5; left 5/5  Wrist extensors: right 5/5; left 5/5  Wrist flexors: right 5/5; left 5/5  : right 5/5; left 5/5  Interosseous muscles: right 5/5; left 5/5    Lower extremities:  Hip flexors: right 5/5; left 5/5  Knee extensors: right 5/5; left 5/5  Knee flexors: right 5/5; left 5/5  Foot dorsiflexors: right 5/5; left 5/5  Foot plantar flexors: right 5/5; left 5/5  Extensor hallucis longus: right 5/5; left 5/5    Sensation:  Normal to light touch x 4 extremities    Reflexes:  Biceps: right 2+/4; left 2+/4  Brachioradialis: right 2+/4; left 2+/4  Triceps: right 2+/4; left 2+/4  Knee: right 3+/4; left 3+/4  Ankle: right 2+/4; left 2+/4    Estess sign: right positive; left positive  Babinski: right downgoing; left downgoing  Clonus: right negative; left negative    Coordination:  Finger to nose: right normal; left normal    Musculoskeletal:    Gait: unstable, especially with tandem gait    Cervical: No pain with palpation, Full ROM  Upper back: No pain with palpation  Lower back: No pain with palpation      Data Review:    Laboratory Review:  Blood Gases:  No results found for: "PHART", "YOP9YAC", "PO2ART", "RQR5LXI", "U4YWXHWD", "BEART"    CBC without Differential:  Lab Results   Component Value Date    WBC 12.32 (H) 04/28/2024    HGB 13.9 04/28/2024    HCT 41.5 04/28/2024     04/28/2024    MCV 89.4 04/28/2024     Differential:   No results " "found for: "NEUTROABS", "LYMPHSABS", "BASOSABS", "MONOSABS"    Basic Metabolic Panel:  BMP  Lab Results   Component Value Date     04/28/2024    K 3.6 04/28/2024    CO2 27 04/28/2024    BUN 19.3 04/28/2024    CREATININE 0.90 04/28/2024    CALCIUM 8.6 04/28/2024    EGFRNORACEVR >60 04/28/2024     Coagulation Studies:  Lab Results   Component Value Date    INR 1.0 04/27/2024    INR 0.9 10/10/2017    PROTIME 12.9 04/27/2024    PROTIME 10.0 10/10/2017     Lab Results   Component Value Date    PTT 25.4 10/10/2017     Urinalysis:  Lab Results   Component Value Date    LEUKOCYTESUR 75 (A) 04/28/2024    UROBILINOGEN Normal 04/28/2024          Radiology:  I have personally reviewed and evaluated the following reports as well as radiographic studies:    Cervical spine x-rays, 04/27/2024- normal alignment with multilevel degenerative disc disease.  There is no motion on flexion-extension views.    CT head without contrast, 04/19/2024- radiology report only- no acute abnormalities.    CT cervical spine without contrast, 04/19/2024- radiology report only- there is normal alignment with multilevel degenerative disc disease.      MRI brain without gadolinium, 04/27/2024- no acute abnormalities.    MRI cervical spine without gadolinium, 04/27/2024- normal alignment.  There is severe stenosis with bilateral neuroforaminal narrowing at C3-4 and C4-5.  There is a left-sided disc bulge at C5-6 with left neuroforaminal narrowing.  Within the spinal cord at the C3-4 level, there is increased T2 signal, which is consistent with myelomalacia.    ASSESSMENT/PLAN:     Ms. Hidalgo is a 53 y.o.female who has a past medical history significant for hypertension, COPD, opiate dependence on methadone, and anxiety. For the last 2-3 months, the patient has been experiencing heaviness in her bilateral arms, pain across her chest, difficulty walking, and falling episodes.  Ms. Hidalgo has a normal motor and sensory neurological exam with " myelopathy.    A MRI cervical spine without gadolinium demonstrates normal alignment.  There is severe stenosis with bilateral neuroforaminal narrowing at C3-4 and C4-5.  There is a left-sided disc bulge at C5-6 with left neuroforaminal narrowing.  Within the spinal cord at the C3-4 level, there is increased T2 signal, which is consistent with myelomalacia.      1.  The patient has been admitted to a floor bed on the hospitalist service.  Neuro checks have been ordered Q4 hrs.    2.  I discussed with Ms. Hidalgo and her family that she has severe multilevel cervical stenosis that is causing her to have neurological symptoms.  Should the patient be involved in a car accident or fall, her risk of permanent paralysis is much higher than average due to the small amount of reserve that she has in her cervical spinal canal.  Therefore, I recommend surgery, specifically a C3-4 to C5-6 laminectomy with intraoperative neuro monitoring.  I reviewed the risks, benefits, and alternatives of this surgery.  Ms. Hidalgo agrees to proceed.  All questions were answered to her satisfaction.  The patient's signed a surgical consent form, which was placed in her medical record.      3.  The patient has completed preoperative clearance from cardiologist Dr. Prater today with low cardiac risk.    4.  Proceeding with surgery will be scheduled on an outpatient basis due to current infectious processes.  Ms. Hidalgo has been taking Cefdinir for an upper respiratory infection and she has a newly diagnosed urinary tract infection.  I spoke with hospitalist Dr. Fan, who plans to prescribe 5 days of levofloxacin treatment of these conditions.    5.  I am requesting nurse Troy to arrange for a urinalysis and culture to be collected in 1 week on Monday, 05/06/2024.  In addition, Ms. Hidalgo needs to be scheduled for a C3-4 to C5-6 laminectomy with intraoperative neuro monitoring on Monday, 05/13/2024 at 10:00 AM.      6.  I am requesting the  Neurosurgery staff to push her CT head and CT C-spine images from 04/19/2024 to the PACS system.    7.  A PTT lab will be completed prior to her discharge from the hospital today.      8.  I advised the patient to become nicotine free, as nicotine inhibits tissue healing.    9. Ms. Hidalgo has been evaluated by physical therapy and is being issued a walker.      10.  She is cleared from a neurosurgery standpoint to be discharged to home today.      Thank you for referring this very pleasant patient to the neurosurgery service.         The risks, benefits, and alternatives to surgery were discussed with the patient.     Complications of a Posterior Cervical Laminectomy may include:  Failure to improve symptoms and/or increased or persistent pain; Recurrence, continuation, or worsening of the condition that required the operation; Need for further surgical intervention or treatment; Neurological injury, which may include spinal cord or nerve root injury, paralysis (which involves the inability to move arms and/or legs), clumsiness, loss of sympathetic response, loss of sensation, and loss of bowel, bladder, and sexual function; Delayed or immediate spinal instability; Cerebral spinal fluid leak; Meningitis; Damage to major blood vessels, nerves, and surrounding anatomical structures; Scarring; Blindness; and Positioning problems such as neuropathy or compartment syndrome.    Complications of any surgery may include:  Adverse reaction to anesthesia; Bleeding; Transfusion of blood products, which carries a risk of infection or reaction; Infection, which requires treatment with antibiotics by mouth or intravenously, or even further surgeries; Urinary tract infection; Heart attack, stroke, pneumonia, and DVT/PE (blood clot in the legs or pelvis that can dislodge and go to the lungs); Other unforeseen complications; Coma; and Death.    Benefits of surgery include:  Possible improvement in arm pain, numbness, and/or  weakness; and possible improvement in neck pain.    Alternatives to the procedure include:    Physical therapy, chiropractic care, acupuncture, medical therapy, and pain management.        Lilian Harding MD  Neurosurgery

## 2024-04-28 NOTE — H&P (VIEW-ONLY)
[facility]  Hospital Consultation      Reason for Consultation:  2-3 months of heaviness in bilateral arms, pain across her chest, difficulty walking, and falling episodes.  A MRI cervical spine without gadolinium demonstrates normal alignment.  There is severe stenosis with bilateral neuroforaminal narrowing at C3-4 and C4-5.  There is a left-sided disc bulge at C5-6 with left neuroforaminal narrowing.  Within the spinal cord at the C3-4 level, there is increased T2 signal, which is consistent with myelomalacia.    Consulted by: Dr. Max Andersen, ER Medicine    Date of Consultation: 4/28/2024  Date of Admission: 4/27/2024     SUBJECTIVE:     Chief Complaint:  2-3 months of heaviness in bilateral arms, difficulty walking, and falling episodes    History of Present Illness:   Ms. Hidalgo is a 53 y.o. right-handed female who has a past medical history significant for hypertension, COPD, opiate dependence on methadone, and anxiety.  She does not take any blood thinner medications.  For the last 2-3 months, the patient has been experiencing heaviness in her bilateral arms, pain across her chest, difficulty walking, and falling episodes.  Ms. Hidalgo was evaluated in an urgent care center in [place], [place] on 04/19/2024 where a CT cervical spine demonstrated normal alignment with multilevel degenerative disc disease.  Her symptoms continue to progress, so the patient presented to the ER at [facility] yesterday on 4/27/2024.  A MRI cervical spine without gadolinium demonstrated severe stenosis with bilateral neuroforaminal narrowing at C3-4 and C4-5.  There was a left-sided disc bulge at C5-6 with left neuroforaminal narrowing.  Within the spinal cord at the C3-4 level, there was increased T2 signal, which is consistent with myelomalacia.  Neurosurgery was consulted for further evaluation.      I evaluated Ms. Hidalgo in her hospital room with several family  members at the bedside.  She has not had any pain related symptoms and also has not had any recent trauma.  In addition to the feeling of heaviness in her bilateral arms, the patient has been experiencing intermittent numbness in her bilateral fingers.  She does not have any focal weakness, although the patient feels like her legs give out from under her when she walks.  Ms. Hidalgo has fallen a few times in the last few months and during this hospital stay was issued a walker after being evaluated by physical therapy.  There been no reports of bowel or bladder incontinence.    The patient started Cdefdinir 2 days ago on 2024 as prescribed by her primary care physician for a runny nose and upper respiratory infection symptoms.  She has not had any fevers.  In addition, on her admission lab work, Ms. Hidalgo was diagnosed with a urinary tract infection.      Patient Active Problem List    Diagnosis Date Noted    Bilateral arm weakness 2024     Past Medical History:   Diagnosis Date    COPD (chronic obstructive pulmonary disease)     Hypertension      Past Surgical History:   Procedure Laterality Date     SECTION         Current Facility-Administered Medications:     acetaminophen tablet 650 mg, 650 mg, Oral, Q8H PRN, Mao James MD    albuterol-ipratropium 2.5 mg-0.5 mg/3 mL nebulizer solution 3 mL, 3 mL, Nebulization, Q4H PRN, Mao James MD    atorvastatin tablet 40 mg, 40 mg, Oral, QHS, Moa James MD    carvediloL tablet 25 mg, 25 mg, Oral, BID, Mao James MD, 25 mg at 24 0836    cefTRIAXone (Rocephin) 1 g in dextrose 5 % in water (D5W) 100 mL IVPB (MB+), 1 g, Intravenous, Q24H, Zi Fan MD    docusate sodium capsule 100 mg, 100 mg, Oral, BID, Lilian Harding MD, 100 mg at 24 0836    hydrALAZINE tablet 25 mg, 25 mg, Oral, TID, Mao James MD, 25 mg at 24 0836    hydrOXYzine pamoate capsule 25 mg, 25 mg, Oral, BID  PRN, Mao James MD    melatonin tablet 6 mg, 6 mg, Oral, Nightly PRN, Mao James MD    methadone 5 mg/5 mL liquid (PEDS) 95 mg, 95 mg, Oral, Daily, Zi Fan MD, 95 mg at 04/28/24 0841    nicotine 14 mg/24 hr 1 patch, 1 patch, Transdermal, Daily, Zi Fan MD    QUEtiapine tablet 50 mg, 50 mg, Oral, Nightly, Mao James MD, 50 mg at 04/27/24 2338    sertraline tablet 100 mg, 100 mg, Oral, QHS, Mao James MD    sodium chloride 0.9% flush 10 mL, 10 mL, Intravenous, PRNJacob Christopher C, MD    Review of patient's allergies indicates:   Allergen Reactions    Codeine Rash       Social History     Tobacco Use    Smoking status: Every Day     Types: Cigarettes    Smokeless tobacco: Never   Substance Use Topics    Alcohol use: Never     Occupation: unemployed    No family history on file.    ROS:  Constitutional:  Negative for chills and fever.   HENT:  Positive for congestion and sore throat.    Eyes:  Negative for blurred vision and double vision.   Respiratory:  Positive for shortness of breath, Negative for cough.   Cardiovascular:  Positive for chest pain, Negative for  palpitations.   Gastrointestinal:  Positive for nausea, Negative for constipation, diarrhea, and vomiting.   Musculoskeletal:  Negative for back pain and neck pain.   Neurological:  Positive for sensory change, Negative for focal weakness and headaches.   Endo/Heme/Allergies:  Does not bruise/bleed easily.   Psychiatric/Behavioral:  Positive for anxiety and depression.       OBJECTIVE:     Vital Signs (Most Recent)  Temp: 97.2 °F (36.2 °C) (04/28/24 1226)  Pulse: 65 (04/28/24 1226)  Resp: 20 (04/28/24 1226)  BP: 114/75 (04/28/24 1226)  SpO2: 95 % (04/28/24 1226)  Body mass index is 31.18 kg/m².      Constitutional:   Well developed, cooperative, conversant    Body habitus:  Mildly obese    Mental Status:   GCS- 15  E-4, V-5, M-6    Opens eyes spontaneously  Oriented x 3  Follows commands in all  "extremities    Cranial nerves:  CN II: PERRL, 4 to 3 mm, brisk bilaterally  CN III, IV, and VI: extraocular movements normal, no ptosis  CN V: normal facial sensation and masseter function  CN VII: facial strength normal and symmetrical  CN VIII: hearing normal bilaterally  CN IX and X: swallowing and phonation normal  CN XI: shoulder shrug intact bilaterally  CN XII: tongue protrusion midline    Motor:  Muscle bulk: normal in all extremities  Tone: normal in all extremities    Upper extremities:  Deltoid: right 5/5; left 5/5  Biceps: right 5/5; left 5/5  Triceps: right 5/5; left 5/5  Wrist extensors: right 5/5; left 5/5  Wrist flexors: right 5/5; left 5/5  : right 5/5; left 5/5  Interosseous muscles: right 5/5; left 5/5    Lower extremities:  Hip flexors: right 5/5; left 5/5  Knee extensors: right 5/5; left 5/5  Knee flexors: right 5/5; left 5/5  Foot dorsiflexors: right 5/5; left 5/5  Foot plantar flexors: right 5/5; left 5/5  Extensor hallucis longus: right 5/5; left 5/5    Sensation:  Normal to light touch x 4 extremities    Reflexes:  Biceps: right 2+/4; left 2+/4  Brachioradialis: right 2+/4; left 2+/4  Triceps: right 2+/4; left 2+/4  Knee: right 3+/4; left 3+/4  Ankle: right 2+/4; left 2+/4    Estess sign: right positive; left positive  Babinski: right downgoing; left downgoing  Clonus: right negative; left negative    Coordination:  Finger to nose: right normal; left normal    Musculoskeletal:    Gait: unstable, especially with tandem gait    Cervical: No pain with palpation, Full ROM  Upper back: No pain with palpation  Lower back: No pain with palpation      Data Review:    Laboratory Review:  Blood Gases:  No results found for: "PHART", "NKM8PGV", "PO2ART", "PDM4HUA", "N3DUHLQJ", "BEART"    CBC without Differential:  Lab Results   Component Value Date    WBC 12.32 (H) 04/28/2024    HGB 13.9 04/28/2024    HCT 41.5 04/28/2024     04/28/2024    MCV 89.4 04/28/2024     Differential:   No results " "found for: "NEUTROABS", "LYMPHSABS", "BASOSABS", "MONOSABS"    Basic Metabolic Panel:  BMP  Lab Results   Component Value Date     04/28/2024    K 3.6 04/28/2024    CO2 27 04/28/2024    BUN 19.3 04/28/2024    CREATININE 0.90 04/28/2024    CALCIUM 8.6 04/28/2024    EGFRNORACEVR >60 04/28/2024     Coagulation Studies:  Lab Results   Component Value Date    INR 1.0 04/27/2024    INR 0.9 10/10/2017    PROTIME 12.9 04/27/2024    PROTIME 10.0 10/10/2017     Lab Results   Component Value Date    PTT 25.4 10/10/2017     Urinalysis:  Lab Results   Component Value Date    LEUKOCYTESUR 75 (A) 04/28/2024    UROBILINOGEN Normal 04/28/2024          Radiology:  I have personally reviewed and evaluated the following reports as well as radiographic studies:    Cervical spine x-rays, 04/27/2024- normal alignment with multilevel degenerative disc disease.  There is no motion on flexion-extension views.    CT head without contrast, 04/19/2024- radiology report only- no acute abnormalities.    CT cervical spine without contrast, 04/19/2024- radiology report only- there is normal alignment with multilevel degenerative disc disease.      MRI brain without gadolinium, 04/27/2024- no acute abnormalities.    MRI cervical spine without gadolinium, 04/27/2024- normal alignment.  There is severe stenosis with bilateral neuroforaminal narrowing at C3-4 and C4-5.  There is a left-sided disc bulge at C5-6 with left neuroforaminal narrowing.  Within the spinal cord at the C3-4 level, there is increased T2 signal, which is consistent with myelomalacia.    ASSESSMENT/PLAN:     Ms. Hidalgo is a 53 y.o.female who has a past medical history significant for hypertension, COPD, opiate dependence on methadone, and anxiety. For the last 2-3 months, the patient has been experiencing heaviness in her bilateral arms, pain across her chest, difficulty walking, and falling episodes.  Ms. Hidalgo has a normal motor and sensory neurological exam with " myelopathy.    A MRI cervical spine without gadolinium demonstrates normal alignment.  There is severe stenosis with bilateral neuroforaminal narrowing at C3-4 and C4-5.  There is a left-sided disc bulge at C5-6 with left neuroforaminal narrowing.  Within the spinal cord at the C3-4 level, there is increased T2 signal, which is consistent with myelomalacia.      1.  The patient has been admitted to a floor bed on the hospitalist service.  Neuro checks have been ordered Q4 hrs.    2.  I discussed with Ms. Hidalgo and her family that she has severe multilevel cervical stenosis that is causing her to have neurological symptoms.  Should the patient be involved in a car accident or fall, her risk of permanent paralysis is much higher than average due to the small amount of reserve that she has in her cervical spinal canal.  Therefore, I recommend surgery, specifically a C3-4 to C5-6 laminectomy with intraoperative neuro monitoring.  I reviewed the risks, benefits, and alternatives of this surgery.  Ms. Hidalgo agrees to proceed.  All questions were answered to her satisfaction.  The patient's signed a surgical consent form, which was placed in her medical record.      3.  The patient has completed preoperative clearance from cardiologist Dr. Praetr today with low cardiac risk.    4.  Proceeding with surgery will be scheduled on an outpatient basis due to current infectious processes.  Ms. Hidalgo has been taking Cefdinir for an upper respiratory infection and she has a newly diagnosed urinary tract infection.  I spoke with hospitalist Dr. Fan, who plans to prescribe 5 days of levofloxacin treatment of these conditions.    5.  I am requesting nurse Troy to arrange for a urinalysis and culture to be collected in 1 week on Monday, 05/06/2024.  In addition, Ms. Hidalgo needs to be scheduled for a C3-4 to C5-6 laminectomy with intraoperative neuro monitoring on Monday, 05/13/2024 at 10:00 AM.      6.  I am requesting the  Neurosurgery staff to push her CT head and CT C-spine images from 04/19/2024 to the PACS system.    7.  A PTT lab will be completed prior to her discharge from the hospital today.      8.  I advised the patient to become nicotine free, as nicotine inhibits tissue healing.    9. Ms. Hidalgo has been evaluated by physical therapy and is being issued a walker.      10.  She is cleared from a neurosurgery standpoint to be discharged to home today.      Thank you for referring this very pleasant patient to the neurosurgery service.         The risks, benefits, and alternatives to surgery were discussed with the patient.     Complications of a Posterior Cervical Laminectomy may include:  Failure to improve symptoms and/or increased or persistent pain; Recurrence, continuation, or worsening of the condition that required the operation; Need for further surgical intervention or treatment; Neurological injury, which may include spinal cord or nerve root injury, paralysis (which involves the inability to move arms and/or legs), clumsiness, loss of sympathetic response, loss of sensation, and loss of bowel, bladder, and sexual function; Delayed or immediate spinal instability; Cerebral spinal fluid leak; Meningitis; Damage to major blood vessels, nerves, and surrounding anatomical structures; Scarring; Blindness; and Positioning problems such as neuropathy or compartment syndrome.    Complications of any surgery may include:  Adverse reaction to anesthesia; Bleeding; Transfusion of blood products, which carries a risk of infection or reaction; Infection, which requires treatment with antibiotics by mouth or intravenously, or even further surgeries; Urinary tract infection; Heart attack, stroke, pneumonia, and DVT/PE (blood clot in the legs or pelvis that can dislodge and go to the lungs); Other unforeseen complications; Coma; and Death.    Benefits of surgery include:  Possible improvement in arm pain, numbness, and/or  weakness; and possible improvement in neck pain.    Alternatives to the procedure include:    Physical therapy, chiropractic care, acupuncture, medical therapy, and pain management.        Lilian Harding MD  Neurosurgery

## 2024-04-28 NOTE — NURSING
Nurses Note -- 4 Eyes      4/28/2024   3:53 AM      Skin assessed during: Admit      [x] No Altered Skin Integrity Present    [x]Prevention Measures Documented      [] Yes- Altered Skin Integrity Present or Discovered   [] LDA Added if Not in Epic (Describe Wound)   [] New Altered Skin Integrity was Present on Admit and Documented in LDA   [] Wound Image Taken    Wound Care Consulted? No    Attending Nurse:  Kelsy Vaughan RN/Staff Member:   Katerine

## 2024-04-28 NOTE — DISCHARGE SUMMARY
[facility] Hospital Medicine Discharge Summary    Admit Date: 4/27/2024  Discharge Date and Time: 4/28/20242:39 PM  Admitting Physician: RENETTA Team  Discharging Physician: Zi Fan MD.  Primary Care Physician: Lisa Yang FNP  Consults: Cardiology and Neurosurgery    Discharge Diagnoses:  Cervical myelopathy/radiculopathy from DJD  HX: HTN, COPD, opioid dependence on methadone   Anxiety  Nicotine dependence with current smoking stau     Hospital Course:   Patient is a 53-year-old female with a history of opioid dependence on methadone, HTN, COPD who presents to the ER with complaints of bilateral arm heaviness and tightness across her entire front chest as well as some mild gait disturbances over the last several days.  She had been seen at an outside ER few days prior with a CT showing cervical bulging discs and was referred to a neurosurgeon on an outpatient basis but due to worsening symptoms presented to the ER.    She arrived afebrile hemodynamically stable maintaining normal sats on room air and her laboratory work was unremarkable.  MRI of the cervical spine showed significant degenerative joint disease at multiple levels, MRI of the brain was unremarkable.  Neurosurgery was consulted and recommended admission for further evaluation.  Neurosurgery on board, case personally discussed with Dr. Harding, recommended outpatient surgical plan.  Patient insisted on going home, Neurosurgery also feels patient is cleared for discharge. We discussed patient was recently diagnosed with a upper respiratory infection and was started on cefdinir.  On this admission, urine concerning for UTI, cultures are pending.  Dr. Harding requested something to cover for her upper respiratory and urinary tract infection as she is a surgical candidate and will be scheduled for outpatient surgery and she wants the infection to be controlled prior.  Discussed we will change her antibiotics to Levaquin for 5  days going forward.  Dr. Ortiz office will repeat her urinalysis for clearance prior to scheduling the surgery  Cardiology was consulted for surgical clearance, patient has been cleared for surgery, deemed low risk  Currently NPO  Echo shows EF 60-65% with grade 1 diastolic dysfunction, normal right ventricular systolic function.  No valvular dysfunction  Analgesics as needed, continue methadone 95 mg daily, patient has last bottle for day 14, reported goes to methadone clinic every 14 days and they give her 14 day supply.  She has other 13 empty bottles of methadone in her security lock box.  Avoid opiates/narcotics  Mild leukocytosis of 12 K, urine concerning for infection but patient denied any complaints.  Will wait for urine culture, I will review her cultures tomorrow, if antibiotic needs to be changed, will address them  I had a long conversation with the patient regarding smoking cessation.  Ordered her nicotine 14 mg patch given she reports smoking half a pack.  Did discussed with patient that smoker's has very delayed healing and she needs to quit now.  Also discussed not to smoke with the patch on.  Time spent counseling 4 minutes  Patient requested prescriptions to be sent to her [facility]'s in [place]  Pt has been cleared for discharge per Dr Harding  She will go home with Kwame     Pt was seen and examined on the day of discharge  Vitals:  VITAL SIGNS: 24 HRS MIN & MAX LAST   Temp  Min: 97.2 °F (36.2 °C)  Max: 98.4 °F (36.9 °C) 97.2 °F (36.2 °C)   BP  Min: 98/69  Max: 127/75 114/75   Pulse  Min: 62  Max: 82  65   Resp  Min: 10  Max: 22 20   SpO2  Min: 94 %  Max: 98 % 95 %       Physical Exam:  General: In no acute distress, afebrile, has truncal obesity  Chest: Clear to auscultation bilaterally to the bases, no wheezing, on room air  Heart: RRR, +S1, S2, no appreciable murmur  Abdomen: Soft, nontender, BS +  MSK: Warm, no lower extremity edema, no clubbing or cyanosis  Neurologic: Alert and oriented x4,  Cranial nerve II-XII intact, hand  right 4/5, rest all 3 extremities strength is 5/5    Recent Labs   Lab 04/27/24  1408 04/28/24 0239   WBC 11.92* 12.32*   RBC 4.92 4.64   HGB 14.8 13.9   HCT 43.8 41.5   MCV 89.0 89.4   MCH 30.1 30.0   MCHC 33.8 33.5   RDW 14.6 14.5    310   MPV 9.1 9.1       Recent Labs   Lab 04/27/24  1408 04/28/24 0239    138   K 3.3* 3.6   CO2 27 27   BUN 23.5* 19.3   CREATININE 1.02 0.90   CALCIUM 8.7 8.6   ALBUMIN 3.2* 3.0*   ALKPHOS 138 122   ALT 31 29   AST 18 19   BILITOT 0.6 0.5        Microbiology Results (last 7 days)       Procedure Component Value Units Date/Time    Urine culture [[id number]] Collected: 04/28/24 0233    Order Status: Sent Specimen: Urine Updated: 04/28/24 0249             Echo    Left Ventricle: The left ventricle is normal in size. Normal wall   thickness. There is normal systolic function with a visually estimated   ejection fraction of 60 - 65%. Grade I diastolic dysfunction.    Right Ventricle: Normal right ventricular cavity size. Systolic   function is normal.    No significant valvular dysfunction.         Medication List        START taking these medications      levoFLOXacin 750 MG tablet  Commonly known as: LEVAQUIN  Take 1 tablet (750 mg total) by mouth once daily. for 5 days     nicotine 14 mg/24 hr  Commonly known as: NICODERM CQ  Place 1 patch onto the skin once daily. Do not smoke with the patch on  Start taking on: April 29, 2024            CONTINUE taking these medications      atorvastatin 40 MG tablet  Commonly known as: LIPITOR     carvediloL 25 MG tablet  Commonly known as: COREG     hydrALAZINE 25 MG tablet  Commonly known as: APRESOLINE     hydrOXYzine pamoate 25 MG Cap  Commonly known as: VISTARIL     ibuprofen 800 MG tablet  Commonly known as: ADVIL,MOTRIN     methadone 40 mg disintegrating tablet  Commonly known as: METHADOSE     omeprazole 40 MG capsule  Commonly known as: PRILOSEC     ondansetron 8 MG tablet  Commonly  known as: ZOFRAN  Take 1 tablet (8 mg total) by mouth every 8 (eight) hours as needed for Nausea.     QUEtiapine 50 MG tablet  Commonly known as: SEROQUEL     sertraline 100 MG tablet  Commonly known as: ZOLOFT            STOP taking these medications      cefdinir 300 MG capsule  Commonly known as: OMNICEF     cyclobenzaprine 10 MG tablet  Commonly known as: FLEXERIL     hydroCHLOROthiazide 12.5 MG Tab  Commonly known as: HYDRODIURIL     pantoprazole 40 MG tablet  Commonly known as: PROTONIX     promethazine 25 MG tablet  Commonly known as: PHENERGAN               Where to Get Your Medications        These medications were sent to [id number] - [place] - [address] AT [facility] [address] [place] [address] [facility]  [address] [facility] [address]      Phone: [phone]   levoFLOXacin 750 MG tablet  nicotine 14 mg/24 hr          Explained in detail to the patient about the discharge plan, medications, and follow-up visits. Pt understands and agrees with the treatment plan  Discharge Disposition: Home or Self Care   Discharged Condition: stable  Diet-   Dietary Orders (From admission, onward)       Start     Ordered    04/28/24 1427  Diet Adult Regular  Diet effective now         04/28/24 1427                   Medications Per DC med rec  Activities as tolerated   Follow-up Information       Lisa Yang FNP. Schedule an appointment as soon as possible for a visit in 2 week(s).    Specialty: Family Medicine  Why: post discharge  Contact information:  [address] [address] [phone]               Lilian Harding MD. Schedule an appointment as soon as possible for a visit in 1 week(s).    Specialty: Neurosurgery  Why: post discharge  Contact information:  [address] [place] [address] Dr Jude GONZALEZ [address] [phone]                           For further questions contact hospitalist office    Discharge time 34 minutes    For worsening symptoms, chest pain, shortness  of breath, increased abdominal pain, high grade fever, stroke or stroke like symptoms, immediately go to the nearest Emergency Room or call 911 as soon as possible.    Portions of this note dictated using EMR integrated voice recognition software, and may be subject to voice recognition errors not corrected at proofreading. Please contact writer for clarification if needed    Zi aFn MD  Department of Hospital Medicine   [facility]   04/28/2024 2:39 PM

## 2024-04-28 NOTE — PT/OT/SLP EVAL
Physical Therapy Evaluation    Patient Name:  Brie Hidalgo   MRN:  [id number]    Recommendations:     Discharge therapy intensity: Low Intensity Therapy   Discharge Equipment Recommendations: walker, rolling   Barriers to discharge: Ongoing medical needs    Assessment:     Brie Hidalgo is a 53 y.o. female admitted with a medical diagnosis of severe stenosis with bilateral neuroforaminal narrowing at C3-4 and C4-5. There is a left-sided disc bulge at C5-6 with left neuroforaminal narrowing. Within the spinal cord at the C3-4 level, there is increased T2 signal, which is consistent with myelomalacia. She presents with the following impairments/functional limitations: weakness, impaired endurance, impaired self care skills, impaired functional mobility, gait instability, impaired balance, decreased safety awareness. Pt overall SBA but due to poor stability and foot clearance she is at increased risk of fall. Improved with use of RW. At time of evaluation, pt pending neurosurgical plan. If discharged home pt would need RW for mobility with outpatient PT services. PT will follow throughout acute admission for instruction on proper use of RW and LE strengthening.    Rehab Prognosis: Fair; patient would benefit from acute skilled PT services to address these deficits and reach maximum level of function.    Recent Surgery: * No surgery found *      Plan:     During this hospitalization, patient would benefit from acute PT services 6 x/week to address the identified rehab impairments via gait training, therapeutic activities, therapeutic exercises, neuromuscular re-education and progress toward the following goals:    Plan of Care Expires:  05/10/24    Subjective     Chief Complaint: none  Patient/Family Comments/goals:   Pain/Comfort:  Pain Rating 1: 0/10  Pain Rating Post-Intervention 1: 0/10    Patients cultural, spiritual, Adventist conflicts given the current situation: no    Living Environment:  Pt lives at home  with spouse in single level home with 3 steps to enter. Family reports multiple falls related to GRECIA feet dragging.  Prior to admission, patients level of function was declining.  Equipment used at home: none.  DME owned (not currently used): none.  Upon discharge, patient will have assistance from family.    Objective:     Communicated with nurse prior to session.  Patient found  lying in bed with spouse  with peripheral IV  upon PT entry to room.    General Precautions: Standard, fall  Orthopedic Precautions:N/A   Braces: N/A  Respiratory Status: Room air      Exams:  Cognitive Exam:  Patient is oriented to Person, Place, Time, and Situation  RLE ROM: WFL  RLE Strength: WFL  LLE ROM: WFL  LLE Strength: WFL  Skin integrity: Visible skin intact      Functional Mobility:  Bed Mobility:     Supine to Sit: independence  Transfers:     Sit to Stand:  stand by assistance with no AD  Gait: 5ft without AD, pt with shuffled steps and reaching for surroundings. Introduced RW with positive response. Pt with improved trunk stability, step continuity, and GRECIA foot clearance. 50ft with SBA using RW      AM-PAC 6 CLICK MOBILITY  Total Score:21       Patient provided with verbal education and demonstrations education regarding PT role/goals/POC, fall prevention, safety awareness, and discharge/DME recommendations.  Understanding was verbalized, however additional teaching warranted.     Patient left sitting edge of bed with all lines intact, call button in reach, nurse notified, and family present.    GOALS:   Multidisciplinary Problems       Physical Therapy Goals          Problem: Physical Therapy    Goal Priority Disciplines Outcome Goal Variances Interventions   Physical Therapy Goal     PT, PT/OT Progressing     Description: Goals to be met by: 2024     Patient will increase functional independence with mobility by performin. Gait  x 300 feet with Modified [place] using Rolling Walker.   2. Ascend/descend 3  stair with left Handrails Modified [place] using No Assistive Device.                          History:     Past Medical History:   Diagnosis Date    COPD (chronic obstructive pulmonary disease)     Hypertension        Past Surgical History:   Procedure Laterality Date     SECTION         Time Tracking:     PT Received On: 24  PT Start Time: 1024     PT Stop Time: 1035  PT Total Time (min): 11 min     Billable Minutes: Evaluation moderate      2024

## 2024-04-28 NOTE — CONSULTS
Inpatient consult to Cardiology  Consult performed by: Shameka Mayo FNP  Consult ordered by: Mao James MD  Reason for consult: CRA [facility]    Cardiology  Consult Note    Patient Name: Brie Hidalgo  MRN: [id number]  Admission Date: 2024  Hospital Length of Stay: 1 days  Code Status: Full Code   Attending Provider: Mao James MD   Consulting Provider: RUY Locke  Primary Care Physician: Lisa Yang FNP  Principal Problem:Bilateral arm weakness    Patient information was obtained from patient, past medical records, and ER records.     Subjective:     Reason for Consult: BELIA    HPI: Ms. Hidalgo is a 53 year old female who is known to [facility], Dr. Gupta. She presents to the ER with complaints of bilateral arm heaviness x a few days. She reports associated symptoms of heaviness & tightness across her entire front chest with mild gait disturbances over the last few days. She denies CP, SOB, palpitations, nausea, vomiting, fever, or chills. She reports that she was seen in the ER and was told that she has a bulging disc and was set up to see a neurologist in the outpatient setting, but the heaviness worsened prior to her follow-up. Significant labs include WBC 12.32, BUN 23.5. She has 3 negative troponin levels. An EKG was obtained and demonstrated NSR w/ RBBB (which is not new for her). A Cervical spine XR was obtained and demonstrated multilevel degenerative disc disease and spondylosis. An MRI of the cervical spine demonstrated significant degenerative joint disease at multiple levels. Neurosurgery was consulted with recommendations for admission and requesting CRA, which is the reason [facility] has been consulted. She is able to do light chores at home without symptoms.      PMH: HTN, COPD, VHD, anxiety, HLD, RBBB, opioid dependence  PSH:   Family History: Father - HTN, CA, CAD; Mother - MI, AAA  Social History: Every day  tobacco use. Denies alcohol or illicit drug use.     Previous Cardiac Diagnostics:   BLE Venous US (11.10.23):  The study quality is good. The patient was scanned in the reverse Trendelenburg position.  There is no evidence of thrombus in either lower extremity.  Rt. GSV:  1.3s reflux at proximal thigh.  Lt. GSV:  No reflux.   Neither SSV is visualized.    TTE (10.5.15):  The study quality is good.   The left ventricle is normal in size. Global left ventricular systolic function is normal. The left ventricular ejection fraction is 65%. Left ventricular diastolic function is normal.   Mild (1+) tricuspid regurgitation. Trace mitral regurgitation.  The pulmonary artery systolic pressure is 18 mmHg.     ETT (9.16.15):  Stress EKG is normal.   Maximal exercise treadmill test (MPHR : 97 %).  The functional capacity is good (10.1 METs).  The heart rate recovery is normal.   The study quality is average.     Review of patient's allergies indicates:   Allergen Reactions    Codeine Rash     Current Facility-Administered Medications   Medication Dose Route Frequency Provider Last Rate Last Admin    acetaminophen tablet 650 mg  650 mg Oral Q8H PRN Mao James MD        albuterol-ipratropium 2.5 mg-0.5 mg/3 mL nebulizer solution 3 mL  3 mL Nebulization Q4H PRN Mao James MD        atorvastatin tablet 40 mg  40 mg Oral QHS Mao James MD        carvediloL tablet 25 mg  25 mg Oral BID Mao James MD        docusate sodium capsule 100 mg  100 mg Oral BID Lilian Harding MD        hydrALAZINE tablet 25 mg  25 mg Oral TID Mao James MD        hydrOXYzine pamoate capsule 25 mg  25 mg Oral BID PRN Mao James MD        melatonin tablet 6 mg  6 mg Oral Nightly PRN Mao James MD        methadone 5 mg/5 mL liquid (PEDS) 95 mg  95 mg Oral Daily Mao James MD        QUEtiapine tablet 50 mg  50 mg Oral Nightly Mao James MD   50 mg at  04/27/24 2338    sertraline tablet 100 mg  100 mg Oral QHS Mao James MD        sodium chloride 0.9% flush 10 mL  10 mL Intravenous PRN Mao James MD         Review of Systems   Respiratory:  Negative for shortness of breath.    Cardiovascular:  Negative for chest pain and palpitations.   Neurological:         Gait disturbances  Bilateral arm heaviness       Objective:     Vital Signs (Most Recent):  Temp: 98.4 °F (36.9 °C) (04/28/24 0253)  Pulse: 63 (04/28/24 0528)  Resp: (!) 21 (04/28/24 0253)  BP: 115/76 (04/28/24 0528)  SpO2: 96 % (04/28/24 0253) Vital Signs (24h Range):  Temp:  [98.4 °F (36.9 °C)] 98.4 °F (36.9 °C)  Pulse:  [62-82] 63  Resp:  [10-21] 21  SpO2:  [94 %-98 %] 96 %  BP: ()/(63-82) 115/76   Weight: 79.8 kg (176 lb)  Body mass index is 31.18 kg/m².  SpO2: 96 %     No intake or output data in the 24 hours ending 04/28/24 0751  Lines/Drains/Airways       Peripheral Intravenous Line  Duration                  Peripheral IV - Single Lumen 04/27/24 1643 20 G Anterior;Right Forearm <1 day                  Significant Labs:  Recent Results (from the past 72 hour(s))   EKG 12-lead    Collection Time: 04/27/24  1:42 PM   Result Value Ref Range    QRS Duration 130 ms    [facility] QTC Calculation 495 ms   Comprehensive metabolic panel    Collection Time: 04/27/24  2:08 PM   Result Value Ref Range    Sodium Level 137 136 - 145 mmol/L    Potassium Level 3.3 (L) 3.5 - 5.1 mmol/L    Chloride 102 98 - 107 mmol/L    Carbon Dioxide 27 22 - 29 mmol/L    Glucose Level 130 (H) 74 - 100 mg/dL    Blood Urea Nitrogen 23.5 (H) 9.8 - 20.1 mg/dL    Creatinine 1.02 0.55 - 1.02 mg/dL    Calcium Level Total 8.7 8.4 - 10.2 mg/dL    Protein Total 6.8 6.4 - 8.3 gm/dL    Albumin Level 3.2 (L) 3.5 - 5.0 g/dL    Globulin 3.6 (H) 2.4 - 3.5 gm/dL    Albumin/Globulin Ratio 0.9 (L) 1.1 - 2.0 ratio    Bilirubin Total 0.6 <=1.5 mg/dL    Alkaline Phosphatase 138 40 - 150 unit/L    Alanine Aminotransferase 31 0 - 55  unit/L    Aspartate Aminotransferase 18 5 - 34 unit/L    eGFR >60 mls/min/1.73/m2   Troponin I    Collection Time: 04/27/24  2:08 PM   Result Value Ref Range    Troponin-I <0.010 0.000 - 0.045 ng/mL   CBC with Differential    Collection Time: 04/27/24  2:08 PM   Result Value Ref Range    WBC 11.92 (H) 4.50 - 11.50 x10(3)/mcL    RBC 4.92 4.20 - 5.40 x10(6)/mcL    Hgb 14.8 12.0 - 16.0 g/dL    Hct 43.8 37.0 - 47.0 %    MCV 89.0 80.0 - 94.0 fL    MCH 30.1 27.0 - 31.0 pg    MCHC 33.8 33.0 - 36.0 g/dL    RDW 14.6 11.5 - 17.0 %    Platelet 370 130 - 400 x10(3)/mcL    MPV 9.1 7.4 - 10.4 fL    Neut % 57.8 %    Lymph % 31.8 %    Mono % 7.0 %    Eos % 1.4 %    Basophil % 0.7 %    Lymph # 3.79 0.6 - 4.6 x10(3)/mcL    Neut # 6.90 2.1 - 9.2 x10(3)/mcL    Mono # 0.83 0.1 - 1.3 x10(3)/mcL    Eos # 0.17 0 - 0.9 x10(3)/mcL    Baso # 0.08 <=0.2 x10(3)/mcL    IG# 0.15 (H) 0 - 0.04 x10(3)/mcL    IG% 1.3 %    NRBC% 0.0 %   Protime-INR    Collection Time: 04/27/24  7:55 PM   Result Value Ref Range    PT 12.9 12.5 - 14.5 seconds    INR 1.0 <=1.3   Type & Screen    Collection Time: 04/27/24  7:56 PM   Result Value Ref Range    Group & Rh O POS     Indirect Moni GEL NEG     Specimen Outdate 04/30/2024 23:59    ABORH RETYPE    Collection Time: 04/27/24  8:54 PM   Result Value Ref Range    ABORH Retype O POS    Troponin I    Collection Time: 04/27/24  8:55 PM   Result Value Ref Range    Troponin-I <0.010 0.000 - 0.045 ng/mL   Urinalysis, Reflex to Urine Culture    Collection Time: 04/28/24  2:33 AM    Specimen: Urine   Result Value Ref Range    Color, UA Light-Yellow Yellow, Light-Yellow, Colorless, Straw, Dark-Yellow    Appearance, UA Clear Clear    Specific Gravity, UA 1.019 1.005 - 1.030    pH, UA 6.5 5.0 - 8.5    Protein, UA Negative Negative    Glucose, UA Normal Negative, Normal    Ketones, UA Negative Negative    Blood, UA Negative Negative    Bilirubin, UA Negative Negative    Urobilinogen, UA Normal 0.2, 1.0, Normal    Nitrites, UA  Negative Negative    Leukocyte Esterase, UA 75 (A) Negative    WBC, UA 11-20 (A) None Seen, 0-2, 3-5, 0-5 /HPF    Bacteria, UA None Seen None Seen, Trace /HPF    Squamous Epithelial Cells, UA Occasional (A) None Seen /HPF    Mucous, UA Trace (A) None Seen /LPF    RBC, UA 0-5 None Seen, 0-2, 3-5, 0-5 /HPF   Comprehensive metabolic panel    Collection Time: 04/28/24  2:39 AM   Result Value Ref Range    Sodium Level 138 136 - 145 mmol/L    Potassium Level 3.6 3.5 - 5.1 mmol/L    Chloride 101 98 - 107 mmol/L    Carbon Dioxide 27 22 - 29 mmol/L    Glucose Level 86 74 - 100 mg/dL    Blood Urea Nitrogen 19.3 9.8 - 20.1 mg/dL    Creatinine 0.90 0.55 - 1.02 mg/dL    Calcium Level Total 8.6 8.4 - 10.2 mg/dL    Protein Total 6.5 6.4 - 8.3 gm/dL    Albumin Level 3.0 (L) 3.5 - 5.0 g/dL    Globulin 3.5 2.4 - 3.5 gm/dL    Albumin/Globulin Ratio 0.9 (L) 1.1 - 2.0 ratio    Bilirubin Total 0.5 <=1.5 mg/dL    Alkaline Phosphatase 122 40 - 150 unit/L    Alanine Aminotransferase 29 0 - 55 unit/L    Aspartate Aminotransferase 19 5 - 34 unit/L    eGFR >60 mls/min/1.73/m2   Troponin I    Collection Time: 04/28/24  2:39 AM   Result Value Ref Range    Troponin-I <0.010 0.000 - 0.045 ng/mL   CBC with Differential    Collection Time: 04/28/24  2:39 AM   Result Value Ref Range    WBC 12.32 (H) 4.50 - 11.50 x10(3)/mcL    RBC 4.64 4.20 - 5.40 x10(6)/mcL    Hgb 13.9 12.0 - 16.0 g/dL    Hct 41.5 37.0 - 47.0 %    MCV 89.4 80.0 - 94.0 fL    MCH 30.0 27.0 - 31.0 pg    MCHC 33.5 33.0 - 36.0 g/dL    RDW 14.5 11.5 - 17.0 %    Platelet 310 130 - 400 x10(3)/mcL    MPV 9.1 7.4 - 10.4 fL    Neut % 55.6 %    Lymph % 33.2 %    Mono % 7.7 %    Eos % 1.7 %    Basophil % 0.7 %    Lymph # 4.09 0.6 - 4.6 x10(3)/mcL    Neut # 6.85 2.1 - 9.2 x10(3)/mcL    Mono # 0.95 0.1 - 1.3 x10(3)/mcL    Eos # 0.21 0 - 0.9 x10(3)/mcL    Baso # 0.09 <=0.2 x10(3)/mcL    IG# 0.13 (H) 0 - 0.04 x10(3)/mcL    IG% 1.1 %    NRBC% 0.0 %     Significant Imaging:  Imaging Results               X-Ray Chest AP Portable (Final result)  Result time 04/27/24 21:59:29      Final result by Feroz Alarcon MD (04/27/24 21:59:29)                   Impression:      NO ACUTE CARDIOPULMONARY PROCESS IDENTIFIED.      Electronically signed by: Feroz Alarcon  Date:    04/27/2024  Time:    21:59               Narrative:    EXAMINATION:  XR CHEST AP PORTABLE    CLINICAL HISTORY:  Shortness of breath    TECHNIQUE:  One view    COMPARISON:  April 2, 2023.    FINDINGS:  Cardiopericardial silhouette is within normal limits. Lungs are without dense focal or segmental consolidation, congestive process, pleural effusions or pneumothorax.                                       X-Ray Cervical Spine 5 View With Flex And Ext (Final result)  Result time 04/27/24 22:00:46      Final result by Feroz Alarcon MD (04/27/24 22:00:46)                   Impression:      Multilevel degenerative disc disease and spondylosis.      Electronically signed by: Feroz Alarcon  Date:    04/27/2024  Time:    22:00               Narrative:    EXAMINATION:  XR CERVICAL SPINE 5 VIEW WITH FLEX AND EXT    CLINICAL HISTORY:  Other symptoms and signs involving the musculoskeletal system    TECHNIQUE:  Two-view    COMPARISON:  None available    FINDINGS:  There is trace of grade 1 retrolisthesis of C3 on C4.  Cervical vertebrae stature is preserved.  There are intervertebral disc space degenerative changes which are more apparent at the level of C5-C6 and C6-C7.  Multilevel facet arthropathy.  There is no prevertebral soft tissue prominence.                                       MRI Brain Without Contrast (Final result)  Result time 04/27/24 19:28:36      Final result by Feroz Alarcon MD (04/27/24 19:28:36)                   Impression:      No acute intracranial findings identified.      Electronically signed by: Feroz Alarcon  Date:    04/27/2024  Time:    19:28               Narrative:    EXAMINATION:  MRI BRAIN WITHOUT CONTRAST    CLINICAL  HISTORY:  persistent weakness;    TECHNIQUE:  Multiplanar MRI sequences were performed of the brain without contrast.    COMPARISON:  None available    FINDINGS:  Chronic microvascular ischemic changes are minimal with subtle periventricular and subcortical white matter T2 FLAIR hyperintense signals.  There are no other foci of abnormal increased or decreased signal intensity throughout the cerebral hemispheres, cerebellum, basal ganglia or brainstem. Gradient echo sequences demonstrate no evidence of de phasing artifact to suggest hemorrhagic byproducts. No evidence of diffusion restriction or ADC map signal drop out to suggest acute infarct. The sella and suprasellar areas are unremarkable.    The cerebellar tonsils are normally positioned. There is no acute intracranial hemorrhage, hydrocephalus, midline shift or mass effect. No acute extra axial fluid collections identified. The mastoid air cells are clear.                                       MRI Cervical Spine Without Contrast (Final result)  Result time 04/27/24 19:26:20      Final result by Feroz Alarcon MD (04/27/24 19:26:20)                   Impression:      1.  Severe cervical spine degenerative disc disease and spondylosis level by level discussed above.    2.  Small prevertebral fluid of indeterminate significance.      Electronically signed by: Feroz Alarcon  Date:    04/27/2024  Time:    19:26               Narrative:    EXAMINATION:  MRI CERVICAL SPINE WITHOUT CONTRAST    CLINICAL HISTORY:  Spinal stenosis, cervical;    TECHNIQUE:  Multiple MRI pulse sequences were performed of the cervical spine without contrast.    COMPARISON:  None available    FINDINGS:  Cervical vertebrae stature is preserved and there is no significant stenosis.  No acute marrow edematous signal.  There is prevertebral small fluid of indeterminate significance.  There are several indentations upon the ventral and dorsal thecal sac by disc pathology and spondylosis.  Disc  segmental analysis is given below:    At C2-C3, there is osteophyte disc complex and facet arthropathy which causes global effacement of subarachnoid space without cord compression.  There is bilateral uncovertebral and facet arthropathy which results in severe narrowing of the right neural foramen and moderate narrowing of the left neural foramen.    At C3-C4, there is global effacement of the subarachnoid space by osteophyte disc complex and facet arthropathy.  There is cervical cord compression with perhaps some atrophy.  Posterior to the mid aspect of C4 vertebral body are cervical cord bilateral small hyperintensities reflective of myelomalacia.  There is severe spondylotic narrowing of the right neural foramen and marked narrowing of the left neural foramen.    At C4-C5, there is again global obliteration of subarachnoid space by disc pathology and spondylosis.  There is slight flattening of the right posterolateral cord.  There is moderate narrowing of right neural foramen.  The left neural foramen is encroached by uncovertebral and facet arthropathy causing marked narrowing.    At C5-C6, there is central to left paracentral disc bulge which indents the ventral thecal sac without cord compression.  Osteophyte disc complex and uncovertebral arthropathy results in marked narrowing of left neural foramen.  The right neural foramen is adequate.    At C6-C7, there is slight bulging of annulus fibrosis.  Central canal is not stenosed.  There are no narrowings of the neural foramen.    At C7-T1, disc is unremarkable.  Central canal is not stenosed and there are no significant narrowings of the neural foramen                                    EKG:       Telemetry:  SR    Physical Exam  HENT:      Head: Normocephalic.      Nose: Nose normal.      Mouth/Throat:      Mouth: Mucous membranes are moist.   Eyes:      Extraocular Movements: Extraocular movements intact.   Cardiovascular:      Rate and Rhythm: Normal rate  and regular rhythm.      Pulses: Normal pulses.      Heart sounds: Normal heart sounds.   Pulmonary:      Effort: Pulmonary effort is normal.      Breath sounds: Normal breath sounds.   Abdominal:      Palpations: Abdomen is soft.   Musculoskeletal:      Right lower leg: No edema.      Left lower leg: No edema.   Skin:     General: Skin is warm and dry.   Neurological:      Mental Status: She is alert and oriented to person, place, and time.   Psychiatric:         Behavior: Behavior normal.       Home Medications:   No current facility-administered medications on file prior to encounter.     Current Outpatient Medications on File Prior to Encounter   Medication Sig Dispense Refill    atorvastatin (LIPITOR) 40 MG tablet Take 40 mg by mouth every evening.      carvediloL (COREG) 25 MG tablet Take 25 mg by mouth 2 (two) times daily.      cefdinir (OMNICEF) 300 MG capsule Take 300 mg by mouth 2 (two) times daily.      cyclobenzaprine (FLEXERIL) 10 MG tablet Take 10 mg by mouth 3 (three) times daily as needed for Muscle spasms.      hydrALAZINE (APRESOLINE) 25 MG tablet Take 25 mg by mouth 3 (three) times daily.      hydroCHLOROthiazide (HYDRODIURIL) 12.5 MG Tab Take 12.5 mg by mouth once daily.      hydrOXYzine pamoate (VISTARIL) 25 MG Cap Take 25 mg by mouth 2 (two) times daily.      ibuprofen (ADVIL,MOTRIN) 800 MG tablet Take 800 mg by mouth 3 (three) times daily as needed for Pain.      methadone (METHADOSE) 40 mg disintegrating tablet Take 95 mg by mouth once daily.      omeprazole (PRILOSEC) 40 MG capsule Take 40 mg by mouth every morning.      promethazine (PHENERGAN) 25 MG tablet Take 25 mg by mouth 3 (three) times daily as needed for Nausea.      QUEtiapine (SEROQUEL) 50 MG tablet Take 50 mg by mouth nightly.      sertraline (ZOLOFT) 100 MG tablet Take 100 mg by mouth every evening.      ondansetron (ZOFRAN) 8 MG tablet Take 1 tablet (8 mg total) by mouth every 8 (eight) hours as needed for Nausea. 12 tablet 0     pantoprazole (PROTONIX) 40 MG tablet Take 1 tablet (40 mg total) by mouth once daily. 30 tablet 2     Current Inpatient Medications:    Current Facility-Administered Medications:     acetaminophen tablet 650 mg, 650 mg, Oral, Q8H PRN, Mao James MD    albuterol-ipratropium 2.5 mg-0.5 mg/3 mL nebulizer solution 3 mL, 3 mL, Nebulization, Q4H PRN, Mao James MD    atorvastatin tablet 40 mg, 40 mg, Oral, QHS, Mao James MD    carvediloL tablet 25 mg, 25 mg, Oral, BID, Mao James MD    docusate sodium capsule 100 mg, 100 mg, Oral, BID, Lilian Harding MD    hydrALAZINE tablet 25 mg, 25 mg, Oral, TID, Mao James MD    hydrOXYzine pamoate capsule 25 mg, 25 mg, Oral, BID PRN, Mao James MD    melatonin tablet 6 mg, 6 mg, Oral, Nightly PRN, Mao James MD    methadone 5 mg/5 mL liquid (PEDS) 95 mg, 95 mg, Oral, Daily, Mao James MD    QUEtiapine tablet 50 mg, 50 mg, Oral, Nightly, Mao James MD, 50 mg at 04/27/24 2338    sertraline tablet 100 mg, 100 mg, Oral, QHS, Mao James MD    sodium chloride 0.9% flush 10 mL, 10 mL, Intravenous, PRN, Mao James MD  VTE Risk Mitigation (From admission, onward)           Ordered     IP VTE HIGH RISK PATIENT  Once         04/27/24 2048     Place sequential compression device  Until discontinued         04/27/24 2048                  Assessment:   Cardiac Risk Assessment  Cervical Myelopathy/Radiculopathy  HTN  HLD  RBBB  VHD    - Mild TR, Trace MR (10.5.15)  COPD  Opioid Dependence of Methadone    Plan:   EKG reviewed. She has a history of RBBB.  Echo pending.   She is free of active cardiac symptoms currently. Troponin levels are negative x 3.   Risk assessment to follow echo results.     Thank you for your consult.     Shameka Mayo, FNP  Cardiology  [facility]  04/28/2024     I personally reviewed the NP history and  "physical above.  See below MDM component performed by me (Dean Prater MD):    Pre-op eval for spinal surgery  "Chest pain" that appears due to her cervical issues    Trop x3 and EKG ok    Echo is fine this morning    She says that as of 1 mo ago, she was able to do chores without CP or SOB (family states she sometimes has SOB at home)  HTN  HLP  RBBB (old)  COPD    Some DANIEL    Recs:  Low cardiac risk for surgery  Please call if needed, will sign off    "

## 2024-04-28 NOTE — H&P
[facility] Hospital Medicine History & Physical Examination       Patient Name: Brie Hidalgo  MRN: [id number]  Patient Class: IP- Inpatient   Admission Date: 2024  1:51 PM  Length of Stay: 0  Admitting Service: Hospital Medicine   Attending Physician: Mao James MD   Primary Care Provider: Lisa Yang FNP  History source: EMR, patient and/or patient's family    CHIEF COMPLAINT   Medical Evaluation (Pt c/o bilateral arm heaviness x a few days. States was seen at [place] [facility] and told she has a bulging disc in neck. Pt has upcoming appt with neurologist but states heaviness has worsened.)    HISTORY OF PRESENT ILLNESS:   Patient is a 53-year-old female with a history of opioid dependence on methadone, HTN, COPD who presents to the ER with complaints of bilateral arm heaviness and tightness across her entire front chest as well as some mild gait disturbances over the last several days.  She had been seen at an outside ER few days prior with a CT showing cervical bulging discs and was referred to a neurosurgeon on an outpatient basis but due to worsening symptoms presented to the ER.      She arrived afebrile hemodynamically stable maintaining normal sats on room air and her laboratory work was unremarkable.  MRI of the cervical spine showed significant degenerative joint disease at multiple levels, MRI of the brain was unremarkable.  Neurosurgery was consulted and recommended admission for further evaluation.    PAST MEDICAL HISTORY:   Opioid dependence on methadone   COPD   Hypertension    PAST SURGICAL HISTORY:     Past Surgical History:   Procedure Laterality Date     SECTION         ALLERGIES:   Codeine    FAMILY HISTORY:   Reviewed and non-contributory     SOCIAL HISTORY:     Social History     Tobacco Use    Smoking status: Every Day     Types: Cigarettes    Smokeless tobacco: Never   Substance Use Topics    Alcohol use: Never        HOME MEDICATIONS:  "    Prior to Admission medications    Medication Sig Start Date End Date Taking? Authorizing Provider   ondansetron (ZOFRAN) 8 MG tablet Take 1 tablet (8 mg total) by mouth every 8 (eight) hours as needed for Nausea. 1/2/24   Jadon Gray MD   pantoprazole (PROTONIX) 40 MG tablet Take 1 tablet (40 mg total) by mouth once daily. 12/28/23 3/27/24  Mao Powell MD       REVIEW OF SYSTEMS:   Except as documented, all other systems reviewed and negative     PHYSICAL EXAM:   T 98.4 °F (36.9 °C)   /63   P 62   RR 16   O2 95 %  GENERAL: awake, alert, oriented and in no acute distress, non-toxic appearing   HEENT: normocephalic atraumatic   NECK: supple   LUNGS: Clear bilaterally, no wheezing or rales, no accessory muscle use   CVS: Regular rate and rhythm, normal peripheral perfusion  ABD: Soft, non-tender, non-distended, bowel sounds present  EXTREMITIES: no clubbing or cyanosis  SKIN: Warm, dry.   NEURO: alert and oriented, grossly without focal deficits   PSYCHIATRIC: Cooperative    LABS AND IMAGING:     Recent Labs     04/27/24  1408   WBC 11.92*   RBC 4.92   HGB 14.8   HCT 43.8   MCV 89.0   MCH 30.1   MCHC 33.8   RDW 14.6        No results for input(s): "LACTIC" in the last 72 hours.  Recent Labs     04/27/24  1955   INR 1.0     No results for input(s): "HGBA1C", "CHOL", "TRIG", "LDL", "VLDL", "HDL" in the last 72 hours.   Recent Labs     04/27/24  1408      K 3.3*   CHLORIDE 102   CO2 27   BUN 23.5*   CREATININE 1.02   GLUCOSE 130*   CALCIUM 8.7   ALBUMIN 3.2*   GLOBULIN 3.6*   ALKPHOS 138   ALT 31   AST 18   BILITOT 0.6     Recent Labs     04/27/24  1408   TROPONINI <0.010          MRI Brain Without Contrast  Narrative: EXAMINATION:  MRI BRAIN WITHOUT CONTRAST    CLINICAL HISTORY:  persistent weakness;    TECHNIQUE:  Multiplanar MRI sequences were performed of the brain without contrast.    COMPARISON:  None available    FINDINGS:  Chronic microvascular ischemic changes are minimal " with subtle periventricular and subcortical white matter T2 FLAIR hyperintense signals.  There are no other foci of abnormal increased or decreased signal intensity throughout the cerebral hemispheres, cerebellum, basal ganglia or brainstem. Gradient echo sequences demonstrate no evidence of de phasing artifact to suggest hemorrhagic byproducts. No evidence of diffusion restriction or ADC map signal drop out to suggest acute infarct. The sella and suprasellar areas are unremarkable.    The cerebellar tonsils are normally positioned. There is no acute intracranial hemorrhage, hydrocephalus, midline shift or mass effect. No acute extra axial fluid collections identified. The mastoid air cells are clear.  Impression: No acute intracranial findings identified.    Electronically signed by: Feroz Alarcon  Date:    04/27/2024  Time:    19:28  MRI Cervical Spine Without Contrast  Narrative: EXAMINATION:  MRI CERVICAL SPINE WITHOUT CONTRAST    CLINICAL HISTORY:  Spinal stenosis, cervical;    TECHNIQUE:  Multiple MRI pulse sequences were performed of the cervical spine without contrast.    COMPARISON:  None available    FINDINGS:  Cervical vertebrae stature is preserved and there is no significant stenosis.  No acute marrow edematous signal.  There is prevertebral small fluid of indeterminate significance.  There are several indentations upon the ventral and dorsal thecal sac by disc pathology and spondylosis.  Disc segmental analysis is given below:    At C2-C3, there is osteophyte disc complex and facet arthropathy which causes global effacement of subarachnoid space without cord compression.  There is bilateral uncovertebral and facet arthropathy which results in severe narrowing of the right neural foramen and moderate narrowing of the left neural foramen.    At C3-C4, there is global effacement of the subarachnoid space by osteophyte disc complex and facet arthropathy.  There is cervical cord compression with perhaps some  atrophy.  Posterior to the mid aspect of C4 vertebral body are cervical cord bilateral small hyperintensities reflective of myelomalacia.  There is severe spondylotic narrowing of the right neural foramen and marked narrowing of the left neural foramen.    At C4-C5, there is again global obliteration of subarachnoid space by disc pathology and spondylosis.  There is slight flattening of the right posterolateral cord.  There is moderate narrowing of right neural foramen.  The left neural foramen is encroached by uncovertebral and facet arthropathy causing marked narrowing.    At C5-C6, there is central to left paracentral disc bulge which indents the ventral thecal sac without cord compression.  Osteophyte disc complex and uncovertebral arthropathy results in marked narrowing of left neural foramen.  The right neural foramen is adequate.    At C6-C7, there is slight bulging of annulus fibrosis.  Central canal is not stenosed.  There are no narrowings of the neural foramen.    At C7-T1, disc is unremarkable.  Central canal is not stenosed and there are no significant narrowings of the neural foramen  Impression: 1.  Severe cervical spine degenerative disc disease and spondylosis level by level discussed above.    2.  Small prevertebral fluid of indeterminate significance.    Electronically signed by: Feroz Alarcon  Date:    04/27/2024  Time:    19:26      ASSESSMENT & PLAN:   Cervical myelopathy/radiculopathy from DJD    HX: HTN, COPD, opioid dependence on methadone     -neurosurgery following  -admission for neuro checks, PT/OT eval  -analgesics as needed  -resume home meds pending med rec process  -patient gets methadone every other Monday, she has a supply up until this Monday and will utilize her supply until out, then will administer through our pharmacy if still here (discussed with nurse)    DVT prophylaxis:  Lovenox  Code status:  Full    If patient was admitted under observational status it is with my  approval/permission.     At least 55 min was spent on this history and physical.  Time seen:  10:00 p.m. 4/27  Mao James MD

## 2024-04-28 NOTE — PLAN OF CARE
Problem: Physical Therapy  Goal: Physical Therapy Goal  Description: Goals to be met by: 2024     Patient will increase functional independence with mobility by performin. Gait  x 300 feet with Modified [place] using Rolling Walker.   2. Ascend/descend 3 stair with left Handrails Modified [place] using No Assistive Device.     Outcome: Progressing

## 2024-04-30 LAB — BACTERIA UR CULT: NORMAL

## 2024-05-01 ENCOUNTER — ANESTHESIA EVENT (OUTPATIENT)
Dept: SURGERY | Facility: HOSPITAL | Age: 54
DRG: 519 | End: 2024-05-01
Payer: MEDICAID

## 2024-05-02 DIAGNOSIS — M47.12 CERVICAL SPONDYLOSIS WITH MYELOPATHY: Primary | ICD-10-CM

## 2024-05-02 DIAGNOSIS — Z87.440 HISTORY OF UTI: ICD-10-CM

## 2024-05-07 ENCOUNTER — TELEPHONE (OUTPATIENT)
Dept: NEUROSURGERY | Facility: CLINIC | Age: 54
End: 2024-05-07
Payer: MEDICAID

## 2024-05-07 RX ORDER — ALBUTEROL SULFATE 90 UG/1
2 AEROSOL, METERED RESPIRATORY (INHALATION) EVERY 4 HOURS PRN
COMMUNITY

## 2024-05-07 RX ORDER — MIRTAZAPINE 30 MG/1
30 TABLET, FILM COATED ORAL NIGHTLY
COMMUNITY
End: 2024-05-28

## 2024-05-07 RX ORDER — AMITRIPTYLINE HYDROCHLORIDE 100 MG/1
100 TABLET ORAL NIGHTLY
Status: ON HOLD | COMMUNITY
End: 2024-05-13 | Stop reason: CLARIF

## 2024-05-07 RX ORDER — ERGOCALCIFEROL 1.25 MG/1
50000 CAPSULE ORAL
COMMUNITY

## 2024-05-07 RX ORDER — ARIPIPRAZOLE 10 MG/1
10 TABLET ORAL DAILY
Status: ON HOLD | COMMUNITY
End: 2024-05-13 | Stop reason: CLARIF

## 2024-05-07 RX ORDER — SUMATRIPTAN SUCCINATE 100 MG/1
100 TABLET ORAL DAILY PRN
COMMUNITY
End: 2024-05-28

## 2024-05-07 RX ORDER — FLUCONAZOLE 10 MG/ML
10 POWDER, FOR SUSPENSION ORAL 3 TIMES DAILY
COMMUNITY
Start: 2024-05-06 | End: 2024-05-28

## 2024-05-07 RX ORDER — HYDROCHLOROTHIAZIDE 12.5 MG/1
12.5 CAPSULE ORAL EVERY MORNING
COMMUNITY

## 2024-05-07 NOTE — TELEPHONE ENCOUNTER
The patient is due to  medications on Monday morning, 5/13, which is the day she is scheduled for surgery.  She will likely need to be at the hospital at 5am that morning.  The earliest she can  her prescription is 5:30am.  The facility requested documentation from our office stating the type of surgery and date that it is scheduled.  Letter typed and faxed to the number provided, REGINE Ackerman... fax [phone].

## 2024-05-07 NOTE — LETTER
[facility] Neurosurgery  [address] [place] [address], [address]  D[place] GONZ[place] [id number]  Phone: [phone] May 7, 2024     Patient: Brie Hidalgo   YOB: 1970           To Whom It May Concern:    The above mentioned patient is scheduled to undergo C3-C6 laminectomy with Dr. Haridng on 5/13/24.  She will need to arrive at the hospital at 5:00 am on the morning of surgery.    If you have any questions or concerns, please don't hesitate to contact my office.    Sincerely,    Giana Bales RN  [facility] Neurosurgery  Dr. Lilian Harding

## 2024-05-09 ENCOUNTER — TELEPHONE (OUTPATIENT)
Dept: NEUROSURGERY | Facility: CLINIC | Age: 54
End: 2024-05-09
Payer: MEDICAID

## 2024-05-10 DIAGNOSIS — M48.02 CERVICAL SPINAL STENOSIS: ICD-10-CM

## 2024-05-10 DIAGNOSIS — M47.12 CERVICAL SPONDYLOSIS WITH MYELOPATHY: Primary | ICD-10-CM

## 2024-05-10 NOTE — TELEPHONE ENCOUNTER
I am requesting Sydni to contact Ms. Hidalgo.  I reviewed the patient's preoperative lab and test results that were completed during her inpatient stay on 04/28/2024.  All of these values are acceptable for proceeding with surgery.      A follow up urinalysis on 05/06/2024 after treatment on levofloxacin is now clear of infection.        Ms. Hidalgo is scheduled to proceed with a C3-4 to C5-6 laminectomy with intraoperative neuro monitoring on Monday, 05/13/2024 at 10:30 AM.

## 2024-05-10 NOTE — PRE-PROCEDURE INSTRUCTIONS
"[facility]: Outpatient Surgery  Preprocedure Instructions    Expectations: "Because of inconsistent procedure completion times, an unexpected wait may occur. The physicians would like you to be here to prepare in the event they run ahead of time. We will make you as comfortable as possible and keep you informed. We apologize in advance if this happens."     Your arrival time for your surgery or procedure  will given to you by your doctor.  We ask patients to arrive about 2 hours before surgery to allow for enough time to review your health history & medications, start your IV, complete any outstanding labwork or tests, and meet your Anesthesiologist.    We are located at [facility], [address] [place] [address].    Enter through the [place] entrance next to the Emergency Room, and come to the 6th floor to the Outpatient Surgery Department.    If you are in need of a wheelchair the morning of surgery please call [phone] about 15 minutes before you arrive. Parking is available in our parking garage located off [facility], between the hospital and [facility].      Visitory Policy:  You are allowed 2 adult visitors to be with you in the hospital. Please, no switching visitors in pre-op area. All hospital visitors should be in good current health.  No small children.  We will update you and your family hourly on the progression of surgery and any unexpected delays.      What to Bring:  Please have your ID, insurance cards, and all home medication bottles with you at check in.  Bring your CPAP machine if one is used at home.     Fasting:  Nothing to eat or drink after midnight the night before your procedure. This includes no ice, gum, hard candies, and/or tobacco products.    Medications:  Follow your doctor's instructions for taking any medications on the morning of your procedure.  If no instructions for taking medications were given, do not take any medications " but bring your medications in their bottles to your procedure check in.     Follow your doctor's preoperative instructions regarding skin prep, bowel prep, bathing, or showering prior to your procedure.  If any special soaps were provided to you, please use according to your doctor's instructions. If no instructions were given from your doctor, take a good bath or shower with antibacterial soap the night before and the morning of your procedure.  On the morning of procedure, wear loose, comfortable clothing.  No lotions, makeup, perfumes, colognes, deodorant, or jewelry to your procedure.  Removable items (glasses, contact lenses, dentures, retainers, hearing aids) need to be removed for your procedure.  Bring your storage containers for these items if you wear them.     Artificial nails, body jewelry, eyelash extensions, and/or hair extensions with metal clips are not allowed during your surgery.  If you currently wear any of these items, please arrange for them to be removed prior to your arrival to the hospital.     Outpatient or Same Day Surgeries:  Any patients receiving sedation/anesthesia are advised not to drive for 24 hours after their procedure.  We do not allow patients to drive themselves home after discharge.  If you are going home after your procedure, please have someone available to drive you home from the hospital.     You may call the Outpatient Surgery Department at [phone] with any questions or concerns.  We are looking forward to meeting you and taking great care of you for your procedure.  Thank you for choosing [facility] for your surgical needs.

## 2024-05-12 NOTE — ANESTHESIA PREPROCEDURE EVALUATION
2024  Brie Hidalgo is a 53 y.o., female  presents to the ER with complaints of bilateral arm heaviness x a few days. She reports associated symptoms of heaviness & tightness across her entire front chest with mild gait disturbances over the last few days. She denies CP, SOB, palpitations, nausea, vomiting, fever, or chills. She reports that she was seen in the ER and was told that she has a bulging disc and was set up to see a neurologist in the outpatient setting, but the heaviness worsened prior to her follow-up. Significant labs include WBC 12.32, BUN 23.5. She has 3 negative troponin levels. An EKG was obtained and demonstrated NSR w/ RBBB (which is not new for her). A Cervical spine XR was obtained and demonstrated multilevel degenerative disc disease and spondylosis. An MRI of the cervical spine demonstrated significant degenerative joint disease at multiple levels. Neurosurgery was consulted with recommendations for admission and requesting CRA, which is the reason [facility] has been consulted. She is able to do light chores at home without symptoms.       PMH: HTN, COPD, VHD, anxiety, HLD, RBBB, opioid dependence  PSH:   Family History: Father - HTN, CA, CAD; Mother - MI, AAA  Social History: Every day tobacco use. Denies alcohol or illicit drug use.     Preop Echo normal x Tr TR and MR      Pre-op Assessment    I have reviewed the Patient Summary Reports.     I have reviewed the Nursing Notes. I have reviewed the NPO Status.   I have reviewed the Medications.     Review of Systems  Cardiovascular:     Hypertension                                  Hypertension         Pulmonary:   COPD         Chronic Obstructive Pulmonary Disease (COPD):                      Hepatic/GI:     GERD      Gerd          Neurological:      Headaches      Dx of Headaches                            Psych:  Psychiatric History                  Physical Exam  General: Well nourished, Cooperative, Alert and Oriented    Airway:  Mallampati: II   Mouth Opening: Normal  TM Distance: Normal  Tongue: Normal  Neck ROM: Normal ROM    Dental:  Dentures        Anesthesia Plan  Type of Anesthesia, risks & benefits discussed:    Anesthesia Type: Gen ETT  Intra-op Monitoring Plan: Standard ASA Monitors and Art Line  Post Op Pain Control Plan: multimodal analgesia and IV/PO Opioids PRN  Airway Plan: Direct, Post-Induction  Informed Consent: Informed consent signed with the Patient and all parties understand the risks and agree with anesthesia plan.  All questions answered. Patient consented to blood products? Yes  ASA Score: 3  Day of Surgery Review of History & Physical: H&P Update referred to the surgeon/provider.    Ready For Surgery From Anesthesia Perspective.     .

## 2024-05-13 ENCOUNTER — ANESTHESIA (OUTPATIENT)
Dept: SURGERY | Facility: HOSPITAL | Age: 54
DRG: 519 | End: 2024-05-13
Payer: MEDICAID

## 2024-05-13 ENCOUNTER — HOSPITAL ENCOUNTER (INPATIENT)
Facility: HOSPITAL | Age: 54
LOS: 3 days | Discharge: HOME | DRG: 519 | End: 2024-05-16
Attending: NEUROLOGICAL SURGERY | Admitting: NEUROLOGICAL SURGERY
Payer: MEDICAID

## 2024-05-13 DIAGNOSIS — G99.2 STENOSIS OF CERVICAL SPINE WITH MYELOPATHY: Primary | ICD-10-CM

## 2024-05-13 DIAGNOSIS — M48.02 STENOSIS OF CERVICAL SPINE WITH MYELOPATHY: Primary | ICD-10-CM

## 2024-05-13 DIAGNOSIS — M48.02 CERVICAL SPINAL STENOSIS: ICD-10-CM

## 2024-05-13 DIAGNOSIS — M47.12 CERVICAL SPONDYLOSIS WITH MYELOPATHY: ICD-10-CM

## 2024-05-13 LAB
GROUP & RH: NORMAL
INDIRECT COOMBS: NORMAL
SPECIMEN OUTDATE: NORMAL

## 2024-05-13 PROCEDURE — 25000003 PHARM REV CODE 250: Performed by: NEUROLOGICAL SURGERY

## 2024-05-13 PROCEDURE — 63048 LAM FACETEC &FORAMOT EA ADDL: CPT | Mod: AS,,,

## 2024-05-13 PROCEDURE — 63048 LAM FACETEC &FORAMOT EA ADDL: CPT | Mod: ,,, | Performed by: NEUROLOGICAL SURGERY

## 2024-05-13 PROCEDURE — 11000001 HC ACUTE MED/SURG PRIVATE ROOM

## 2024-05-13 PROCEDURE — 71000039 HC RECOVERY, EACH ADD'L HOUR: Performed by: NEUROLOGICAL SURGERY

## 2024-05-13 PROCEDURE — 86850 RBC ANTIBODY SCREEN: CPT | Performed by: NEUROLOGICAL SURGERY

## 2024-05-13 PROCEDURE — D9220A PRA ANESTHESIA: Mod: CRNA,,, | Performed by: NURSE ANESTHETIST, CERTIFIED REGISTERED

## 2024-05-13 PROCEDURE — 63600175 PHARM REV CODE 636 W HCPCS: Performed by: NEUROLOGICAL SURGERY

## 2024-05-13 PROCEDURE — 25000003 PHARM REV CODE 250: Performed by: NURSE ANESTHETIST, CERTIFIED REGISTERED

## 2024-05-13 PROCEDURE — 37000008 HC ANESTHESIA 1ST 15 MINUTES: Performed by: NEUROLOGICAL SURGERY

## 2024-05-13 PROCEDURE — 63045 LAM FACETEC & FORAMOT CRV: CPT | Mod: ,,, | Performed by: NEUROLOGICAL SURGERY

## 2024-05-13 PROCEDURE — 36000710: Performed by: NEUROLOGICAL SURGERY

## 2024-05-13 PROCEDURE — 36415 COLL VENOUS BLD VENIPUNCTURE: CPT | Performed by: NEUROLOGICAL SURGERY

## 2024-05-13 PROCEDURE — 25000003 PHARM REV CODE 250: Performed by: ANESTHESIOLOGY

## 2024-05-13 PROCEDURE — 71000033 HC RECOVERY, INTIAL HOUR: Performed by: NEUROLOGICAL SURGERY

## 2024-05-13 PROCEDURE — 27201423 OPTIME MED/SURG SUP & DEVICES STERILE SUPPLY: Performed by: NEUROLOGICAL SURGERY

## 2024-05-13 PROCEDURE — 63600175 PHARM REV CODE 636 W HCPCS: Performed by: ANESTHESIOLOGY

## 2024-05-13 PROCEDURE — 37000009 HC ANESTHESIA EA ADD 15 MINS: Performed by: NEUROLOGICAL SURGERY

## 2024-05-13 PROCEDURE — 63600175 PHARM REV CODE 636 W HCPCS: Performed by: NURSE ANESTHETIST, CERTIFIED REGISTERED

## 2024-05-13 PROCEDURE — D9220A PRA ANESTHESIA: Mod: ANES,,, | Performed by: ANESTHESIOLOGY

## 2024-05-13 PROCEDURE — 00NW0ZZ RELEASE CERVICAL SPINAL CORD, OPEN APPROACH: ICD-10-PCS | Performed by: NEUROLOGICAL SURGERY

## 2024-05-13 PROCEDURE — 36000711: Performed by: NEUROLOGICAL SURGERY

## 2024-05-13 PROCEDURE — 99024 POSTOP FOLLOW-UP VISIT: CPT | Mod: ,,, | Performed by: NEUROLOGICAL SURGERY

## 2024-05-13 PROCEDURE — 63045 LAM FACETEC & FORAMOT CRV: CPT | Mod: AS,,,

## 2024-05-13 RX ORDER — BUPIVACAINE HYDROCHLORIDE AND EPINEPHRINE 5; 5 MG/ML; UG/ML
INJECTION, SOLUTION EPIDURAL; INTRACAUDAL; PERINEURAL
Status: DISCONTINUED | OUTPATIENT
Start: 2024-05-13 | End: 2024-05-13 | Stop reason: HOSPADM

## 2024-05-13 RX ORDER — MORPHINE SULFATE 4 MG/ML
2 INJECTION, SOLUTION INTRAMUSCULAR; INTRAVENOUS EVERY 4 HOURS PRN
Status: DISCONTINUED | OUTPATIENT
Start: 2024-05-13 | End: 2024-05-16 | Stop reason: HOSPADM

## 2024-05-13 RX ORDER — QUETIAPINE FUMARATE 25 MG/1
50 TABLET, FILM COATED ORAL NIGHTLY
Status: DISCONTINUED | OUTPATIENT
Start: 2024-05-13 | End: 2024-05-16 | Stop reason: HOSPADM

## 2024-05-13 RX ORDER — SODIUM CHLORIDE AND POTASSIUM CHLORIDE 150; 900 MG/100ML; MG/100ML
INJECTION, SOLUTION INTRAVENOUS CONTINUOUS
Status: DISCONTINUED | OUTPATIENT
Start: 2024-05-13 | End: 2024-05-14

## 2024-05-13 RX ORDER — CYCLOBENZAPRINE HCL 10 MG
10 TABLET ORAL 3 TIMES DAILY PRN
COMMUNITY
End: 2024-05-28

## 2024-05-13 RX ORDER — DEXAMETHASONE SODIUM PHOSPHATE 4 MG/ML
INJECTION, SOLUTION INTRA-ARTICULAR; INTRALESIONAL; INTRAMUSCULAR; INTRAVENOUS; SOFT TISSUE
Status: DISCONTINUED | OUTPATIENT
Start: 2024-05-13 | End: 2024-05-13

## 2024-05-13 RX ORDER — HEPARIN 100 UNIT/ML
5 SYRINGE INTRAVENOUS ONCE
Status: DISCONTINUED | OUTPATIENT
Start: 2024-05-13 | End: 2024-05-13

## 2024-05-13 RX ORDER — HYDROCODONE BITARTRATE AND ACETAMINOPHEN 5; 325 MG/1; MG/1
1 TABLET ORAL EVERY 6 HOURS PRN
Status: DISCONTINUED | OUTPATIENT
Start: 2024-05-13 | End: 2024-05-14

## 2024-05-13 RX ORDER — ONDANSETRON 4 MG/1
8 TABLET, ORALLY DISINTEGRATING ORAL EVERY 6 HOURS PRN
Status: DISCONTINUED | OUTPATIENT
Start: 2024-05-13 | End: 2024-05-16 | Stop reason: HOSPADM

## 2024-05-13 RX ORDER — PROPOFOL 10 MG/ML
VIAL (ML) INTRAVENOUS
Status: DISCONTINUED | OUTPATIENT
Start: 2024-05-13 | End: 2024-05-13

## 2024-05-13 RX ORDER — SERTRALINE HYDROCHLORIDE 50 MG/1
100 TABLET, FILM COATED ORAL NIGHTLY
Status: DISCONTINUED | OUTPATIENT
Start: 2024-05-13 | End: 2024-05-16 | Stop reason: HOSPADM

## 2024-05-13 RX ORDER — MIDAZOLAM HYDROCHLORIDE 1 MG/ML
2 INJECTION INTRAMUSCULAR; INTRAVENOUS ONCE
Status: COMPLETED | OUTPATIENT
Start: 2024-05-13 | End: 2024-05-13

## 2024-05-13 RX ORDER — HYDROMORPHONE HYDROCHLORIDE 2 MG/ML
INJECTION, SOLUTION INTRAMUSCULAR; INTRAVENOUS; SUBCUTANEOUS
Status: DISCONTINUED | OUTPATIENT
Start: 2024-05-13 | End: 2024-05-13

## 2024-05-13 RX ORDER — MEPERIDINE HYDROCHLORIDE 25 MG/ML
12.5 INJECTION INTRAMUSCULAR; INTRAVENOUS; SUBCUTANEOUS ONCE
Status: DISCONTINUED | OUTPATIENT
Start: 2024-05-13 | End: 2024-05-13 | Stop reason: HOSPADM

## 2024-05-13 RX ORDER — HYDROMORPHONE HYDROCHLORIDE 2 MG/ML
0.2 INJECTION, SOLUTION INTRAMUSCULAR; INTRAVENOUS; SUBCUTANEOUS EVERY 5 MIN PRN
Status: DISCONTINUED | OUTPATIENT
Start: 2024-05-13 | End: 2024-05-13 | Stop reason: HOSPADM

## 2024-05-13 RX ORDER — MIRTAZAPINE 15 MG/1
30 TABLET, FILM COATED ORAL NIGHTLY
Status: DISCONTINUED | OUTPATIENT
Start: 2024-05-13 | End: 2024-05-13

## 2024-05-13 RX ORDER — LIDOCAINE HYDROCHLORIDE 20 MG/ML
INJECTION, SOLUTION EPIDURAL; INFILTRATION; INTRACAUDAL; PERINEURAL
Status: DISCONTINUED | OUTPATIENT
Start: 2024-05-13 | End: 2024-05-13

## 2024-05-13 RX ORDER — FENTANYL CITRATE 50 UG/ML
INJECTION, SOLUTION INTRAMUSCULAR; INTRAVENOUS
Status: DISCONTINUED | OUTPATIENT
Start: 2024-05-13 | End: 2024-05-13

## 2024-05-13 RX ORDER — AMOXICILLIN 250 MG
2 CAPSULE ORAL NIGHTLY PRN
Status: DISCONTINUED | OUTPATIENT
Start: 2024-05-13 | End: 2024-05-16 | Stop reason: HOSPADM

## 2024-05-13 RX ORDER — ROCURONIUM BROMIDE 10 MG/ML
INJECTION, SOLUTION INTRAVENOUS
Status: DISCONTINUED | OUTPATIENT
Start: 2024-05-13 | End: 2024-05-13

## 2024-05-13 RX ORDER — GLYCOPYRROLATE 0.2 MG/ML
INJECTION INTRAMUSCULAR; INTRAVENOUS
Status: DISCONTINUED | OUTPATIENT
Start: 2024-05-13 | End: 2024-05-13

## 2024-05-13 RX ORDER — ALBUTEROL SULFATE 90 UG/1
2 AEROSOL, METERED RESPIRATORY (INHALATION) EVERY 4 HOURS PRN
Status: DISCONTINUED | OUTPATIENT
Start: 2024-05-13 | End: 2024-05-13

## 2024-05-13 RX ORDER — ONDANSETRON HYDROCHLORIDE 2 MG/ML
INJECTION, SOLUTION INTRAVENOUS
Status: DISCONTINUED | OUTPATIENT
Start: 2024-05-13 | End: 2024-05-13

## 2024-05-13 RX ORDER — PROCHLORPERAZINE EDISYLATE 5 MG/ML
5 INJECTION INTRAMUSCULAR; INTRAVENOUS EVERY 6 HOURS PRN
Status: DISCONTINUED | OUTPATIENT
Start: 2024-05-13 | End: 2024-05-16 | Stop reason: HOSPADM

## 2024-05-13 RX ORDER — DIPHENHYDRAMINE HYDROCHLORIDE 50 MG/ML
25 INJECTION INTRAMUSCULAR; INTRAVENOUS EVERY 6 HOURS PRN
Status: DISCONTINUED | OUTPATIENT
Start: 2024-05-13 | End: 2024-05-13 | Stop reason: HOSPADM

## 2024-05-13 RX ORDER — HYDROMORPHONE HYDROCHLORIDE 2 MG/ML
0.5 INJECTION, SOLUTION INTRAMUSCULAR; INTRAVENOUS; SUBCUTANEOUS EVERY 5 MIN PRN
Status: DISCONTINUED | OUTPATIENT
Start: 2024-05-13 | End: 2024-05-13 | Stop reason: HOSPADM

## 2024-05-13 RX ORDER — ONDANSETRON HYDROCHLORIDE 2 MG/ML
4 INJECTION, SOLUTION INTRAVENOUS ONCE
Status: DISCONTINUED | OUTPATIENT
Start: 2024-05-13 | End: 2024-05-13 | Stop reason: HOSPADM

## 2024-05-13 RX ORDER — DEXMEDETOMIDINE HYDROCHLORIDE 100 UG/ML
INJECTION, SOLUTION INTRAVENOUS
Status: DISCONTINUED | OUTPATIENT
Start: 2024-05-13 | End: 2024-05-13

## 2024-05-13 RX ORDER — PANTOPRAZOLE SODIUM 40 MG/1
40 TABLET, DELAYED RELEASE ORAL DAILY
Status: DISCONTINUED | OUTPATIENT
Start: 2024-05-14 | End: 2024-05-16 | Stop reason: HOSPADM

## 2024-05-13 RX ORDER — ATORVASTATIN CALCIUM 40 MG/1
40 TABLET, FILM COATED ORAL NIGHTLY
Status: DISCONTINUED | OUTPATIENT
Start: 2024-05-13 | End: 2024-05-16 | Stop reason: HOSPADM

## 2024-05-13 RX ORDER — SUMATRIPTAN SUCCINATE 25 MG/1
100 TABLET ORAL DAILY PRN
Status: DISCONTINUED | OUTPATIENT
Start: 2024-05-13 | End: 2024-05-16 | Stop reason: HOSPADM

## 2024-05-13 RX ORDER — CEFAZOLIN SODIUM 2 G/50ML
2 SOLUTION INTRAVENOUS
Status: DISCONTINUED | OUTPATIENT
Start: 2024-05-13 | End: 2024-05-13

## 2024-05-13 RX ORDER — PHENYLEPHRINE HYDROCHLORIDE 10 MG/ML
INJECTION INTRAVENOUS
Status: DISCONTINUED | OUTPATIENT
Start: 2024-05-13 | End: 2024-05-13

## 2024-05-13 RX ORDER — CEFAZOLIN SODIUM 1 G/3ML
INJECTION, POWDER, FOR SOLUTION INTRAMUSCULAR; INTRAVENOUS
Status: DISCONTINUED | OUTPATIENT
Start: 2024-05-13 | End: 2024-05-13

## 2024-05-13 RX ORDER — SUCCINYLCHOLINE CHLORIDE 20 MG/ML
INJECTION INTRAMUSCULAR; INTRAVENOUS
Status: DISCONTINUED | OUTPATIENT
Start: 2024-05-13 | End: 2024-05-13

## 2024-05-13 RX ORDER — HYDRALAZINE HYDROCHLORIDE 25 MG/1
25 TABLET, FILM COATED ORAL 3 TIMES DAILY
Status: DISCONTINUED | OUTPATIENT
Start: 2024-05-13 | End: 2024-05-16 | Stop reason: HOSPADM

## 2024-05-13 RX ORDER — IPRATROPIUM BROMIDE AND ALBUTEROL SULFATE 2.5; .5 MG/3ML; MG/3ML
3 SOLUTION RESPIRATORY (INHALATION) EVERY 4 HOURS PRN
Status: DISCONTINUED | OUTPATIENT
Start: 2024-05-13 | End: 2024-05-16 | Stop reason: HOSPADM

## 2024-05-13 RX ORDER — ALUMINUM HYDROXIDE, MAGNESIUM HYDROXIDE, AND SIMETHICONE 1200; 120; 1200 MG/30ML; MG/30ML; MG/30ML
30 SUSPENSION ORAL EVERY 4 HOURS PRN
Status: DISCONTINUED | OUTPATIENT
Start: 2024-05-13 | End: 2024-05-16 | Stop reason: HOSPADM

## 2024-05-13 RX ORDER — DOCUSATE SODIUM 100 MG/1
100 CAPSULE, LIQUID FILLED ORAL 2 TIMES DAILY
Status: DISCONTINUED | OUTPATIENT
Start: 2024-05-13 | End: 2024-05-16 | Stop reason: HOSPADM

## 2024-05-13 RX ORDER — METHOCARBAMOL 100 MG/ML
500 INJECTION, SOLUTION INTRAMUSCULAR; INTRAVENOUS EVERY 8 HOURS PRN
Status: DISPENSED | OUTPATIENT
Start: 2024-05-13 | End: 2024-05-15

## 2024-05-13 RX ORDER — DIAZEPAM 5 MG/1
5 TABLET ORAL
Status: COMPLETED | OUTPATIENT
Start: 2024-05-13 | End: 2024-05-13

## 2024-05-13 RX ORDER — MUPIROCIN 20 MG/G
OINTMENT TOPICAL 2 TIMES DAILY
Status: COMPLETED | OUTPATIENT
Start: 2024-05-13 | End: 2024-05-15

## 2024-05-13 RX ORDER — AMITRIPTYLINE HYDROCHLORIDE 50 MG/1
100 TABLET, FILM COATED ORAL NIGHTLY
Status: DISCONTINUED | OUTPATIENT
Start: 2024-05-13 | End: 2024-05-13

## 2024-05-13 RX ORDER — KETAMINE HYDROCHLORIDE 100 MG/ML
INJECTION, SOLUTION INTRAMUSCULAR; INTRAVENOUS
Status: DISCONTINUED | OUTPATIENT
Start: 2024-05-13 | End: 2024-05-13

## 2024-05-13 RX ORDER — LIDOCAINE HYDROCHLORIDE AND EPINEPHRINE 5; 5 MG/ML; UG/ML
INJECTION, SOLUTION INFILTRATION; PERINEURAL
Status: DISCONTINUED | OUTPATIENT
Start: 2024-05-13 | End: 2024-05-13 | Stop reason: HOSPADM

## 2024-05-13 RX ORDER — ARIPIPRAZOLE 5 MG/1
10 TABLET ORAL DAILY
Status: DISCONTINUED | OUTPATIENT
Start: 2024-05-14 | End: 2024-05-13

## 2024-05-13 RX ORDER — HYDROCHLOROTHIAZIDE 12.5 MG/1
12.5 TABLET ORAL DAILY
Status: DISCONTINUED | OUTPATIENT
Start: 2024-05-14 | End: 2024-05-16 | Stop reason: HOSPADM

## 2024-05-13 RX ORDER — PROPOFOL 10 MG/ML
VIAL (ML) INTRAVENOUS CONTINUOUS PRN
Status: DISCONTINUED | OUTPATIENT
Start: 2024-05-13 | End: 2024-05-13

## 2024-05-13 RX ORDER — ACETAMINOPHEN 10 MG/ML
INJECTION, SOLUTION INTRAVENOUS
Status: DISCONTINUED | OUTPATIENT
Start: 2024-05-13 | End: 2024-05-13

## 2024-05-13 RX ORDER — METHADONE HYDROCHLORIDE 10 MG/5ML
95 SOLUTION ORAL DAILY
Status: DISCONTINUED | OUTPATIENT
Start: 2024-05-14 | End: 2024-05-13

## 2024-05-13 RX ORDER — CARVEDILOL 12.5 MG/1
25 TABLET ORAL 2 TIMES DAILY
Status: DISCONTINUED | OUTPATIENT
Start: 2024-05-13 | End: 2024-05-15

## 2024-05-13 RX ORDER — BACITRACIN 500 [USP'U]/G
OINTMENT TOPICAL
Status: DISCONTINUED | OUTPATIENT
Start: 2024-05-13 | End: 2024-05-13 | Stop reason: HOSPADM

## 2024-05-13 RX ADMIN — ROCURONIUM BROMIDE 5 MG: 10 SOLUTION INTRAVENOUS at 11:05

## 2024-05-13 RX ADMIN — REMIFENTANIL HYDROCHLORIDE 0.12 MCG/KG/MIN: 1 INJECTION, POWDER, LYOPHILIZED, FOR SOLUTION INTRAVENOUS at 11:05

## 2024-05-13 RX ADMIN — FENTANYL CITRATE 50 MCG: 50 INJECTION, SOLUTION INTRAMUSCULAR; INTRAVENOUS at 02:05

## 2024-05-13 RX ADMIN — SODIUM CHLORIDE, SODIUM GLUCONATE, SODIUM ACETATE, POTASSIUM CHLORIDE AND MAGNESIUM CHLORIDE: 526; 502; 368; 37; 30 INJECTION, SOLUTION INTRAVENOUS at 11:05

## 2024-05-13 RX ADMIN — DEXMEDETOMIDINE 10 MCG: 200 INJECTION, SOLUTION INTRAVENOUS at 02:05

## 2024-05-13 RX ADMIN — ACETAMINOPHEN 1000 MG: 10 INJECTION, SOLUTION INTRAVENOUS at 02:05

## 2024-05-13 RX ADMIN — AMITRIPTYLINE HYDROCHLORIDE 100 MG: 50 TABLET, FILM COATED ORAL at 09:05

## 2024-05-13 RX ADMIN — Medication 50 MG: at 11:05

## 2024-05-13 RX ADMIN — PROPOFOL 125 MCG/KG/MIN: 10 INJECTION, EMULSION INTRAVENOUS at 11:05

## 2024-05-13 RX ADMIN — DEXMEDETOMIDINE 10 MCG: 200 INJECTION, SOLUTION INTRAVENOUS at 12:05

## 2024-05-13 RX ADMIN — DIAZEPAM 5 MG: 5 TABLET ORAL at 08:05

## 2024-05-13 RX ADMIN — MIDAZOLAM HYDROCHLORIDE 2 MG: 1 INJECTION, SOLUTION INTRAMUSCULAR; INTRAVENOUS at 11:05

## 2024-05-13 RX ADMIN — POTASSIUM CHLORIDE AND SODIUM CHLORIDE: 900; 150 INJECTION, SOLUTION INTRAVENOUS at 09:05

## 2024-05-13 RX ADMIN — SUCCINYLCHOLINE CHLORIDE 120 MG: 20 INJECTION, SOLUTION INTRAMUSCULAR; INTRAVENOUS at 11:05

## 2024-05-13 RX ADMIN — MORPHINE SULFATE 2 MG: 4 INJECTION INTRAVENOUS at 07:05

## 2024-05-13 RX ADMIN — QUETIAPINE FUMARATE 50 MG: 25 TABLET ORAL at 09:05

## 2024-05-13 RX ADMIN — HYDROMORPHONE HYDROCHLORIDE 0.4 MG: 2 INJECTION, SOLUTION INTRAMUSCULAR; INTRAVENOUS; SUBCUTANEOUS at 02:05

## 2024-05-13 RX ADMIN — PHENYLEPHRINE HYDROCHLORIDE 200 MCG: 10 INJECTION INTRAVENOUS at 11:05

## 2024-05-13 RX ADMIN — METHOCARBAMOL 500 MG: 100 INJECTION INTRAMUSCULAR; INTRAVENOUS at 06:05

## 2024-05-13 RX ADMIN — ONDANSETRON 4 MG: 2 INJECTION INTRAMUSCULAR; INTRAVENOUS at 02:05

## 2024-05-13 RX ADMIN — LIDOCAINE HYDROCHLORIDE 80 MG: 20 INJECTION, SOLUTION INTRAVENOUS at 11:05

## 2024-05-13 RX ADMIN — DOCUSATE SODIUM 100 MG: 100 CAPSULE, LIQUID FILLED ORAL at 09:05

## 2024-05-13 RX ADMIN — DEXAMETHASONE SODIUM PHOSPHATE 8 MG: 4 INJECTION, SOLUTION INTRA-ARTICULAR; INTRALESIONAL; INTRAMUSCULAR; INTRAVENOUS; SOFT TISSUE at 02:05

## 2024-05-13 RX ADMIN — FENTANYL CITRATE 100 MCG: 50 INJECTION, SOLUTION INTRAMUSCULAR; INTRAVENOUS at 11:05

## 2024-05-13 RX ADMIN — CEFAZOLIN 2 G: 330 INJECTION, POWDER, FOR SOLUTION INTRAMUSCULAR; INTRAVENOUS at 11:05

## 2024-05-13 RX ADMIN — PHENYLEPHRINE HYDROCHLORIDE 50 MCG: 10 INJECTION INTRAVENOUS at 02:05

## 2024-05-13 RX ADMIN — KETAMINE HYDROCHLORIDE 20 MG: 100 INJECTION, SOLUTION, CONCENTRATE INTRAMUSCULAR; INTRAVENOUS at 12:05

## 2024-05-13 RX ADMIN — MUPIROCIN: 20 OINTMENT TOPICAL at 09:05

## 2024-05-13 RX ADMIN — MIRTAZAPINE 30 MG: 15 TABLET, FILM COATED ORAL at 09:05

## 2024-05-13 RX ADMIN — HYDRALAZINE HYDROCHLORIDE 25 MG: 25 TABLET ORAL at 09:05

## 2024-05-13 RX ADMIN — SERTRALINE HYDROCHLORIDE 100 MG: 50 TABLET ORAL at 09:05

## 2024-05-13 RX ADMIN — Medication 150 MG: at 11:05

## 2024-05-13 RX ADMIN — CARVEDILOL 25 MG: 12.5 TABLET, FILM COATED ORAL at 09:05

## 2024-05-13 RX ADMIN — FENTANYL CITRATE 100 MCG: 50 INJECTION, SOLUTION INTRAMUSCULAR; INTRAVENOUS at 12:05

## 2024-05-13 RX ADMIN — ATORVASTATIN CALCIUM 40 MG: 40 TABLET, FILM COATED ORAL at 09:05

## 2024-05-13 RX ADMIN — GLYCOPYRROLATE 0.2 MG: 0.2 INJECTION INTRAMUSCULAR; INTRAVENOUS at 11:05

## 2024-05-13 NOTE — INTERVAL H&P NOTE
The patient has been examined and the H&P has been reviewed:    I concur with the findings and no changes have occurred since H&P was written.    Surgery risks, benefits and alternative options discussed and understood by patient/family.      The patient's urinary tract infection has been adequately treated with antibiotics.       There are no hospital problems to display for this patient.

## 2024-05-13 NOTE — TRANSFER OF CARE
"Anesthesia Transfer of Care Note    Patient: Brie Hidalgo    Procedure(s) Performed: Procedure(s) (LRB):  DECOMPRESSION, SPINE, CERVICAL, POSTERIOR APPROACH (N/A)    Patient location: PACU    Anesthesia Type: general    Transport from OR: Transported from OR on room air with adequate spontaneous ventilation    Post pain: adequate analgesia    Post assessment: no apparent anesthetic complications    Post vital signs: stable    Level of consciousness: sedated    Nausea/Vomiting: no nausea/vomiting    Complications: none    Transfer of care protocol was followed      Last vitals: Visit Vitals  /64   Pulse (!) 57   Temp 36.6 °C (97.9 °F) (Oral)   Resp 18   Ht 5' 3" (1.6 m)   Wt 78 kg (172 lb)   SpO2 98%   Breastfeeding No   BMI 30.47 kg/m²     "

## 2024-05-13 NOTE — NURSING
Nurses Note -- 4 Eyes      5/13/2024   6:27 PM      Skin assessed during: Admit      [x] No Altered Skin Integrity Present    [x]Prevention Measures Documented      [] Yes- Altered Skin Integrity Present or Discovered   [] LDA Added if Not in Epic (Describe Wound)   [] New Altered Skin Integrity was Present on Admit and Documented in LDA   [] Wound Image Taken    Wound Care Consulted? No    Attending Nurse:  Sarah JESUS    Second RN/Staff Member:  Shahana JESUS

## 2024-05-13 NOTE — PROGRESS NOTES
Hospital Progress Note  [facility]      Admit Date: 5/13/2024  Post-operative Day: Day of Surgery  Hospital Day: 1    SUBJECTIVE:     POD #0 s/p C3-4 to C5-6 laminectomies    Interval History:  I evaluated Ms. Hidalgo in the PACU.  She is still sleepy from recent general anesthesia and has some neck pain.     Scheduled Meds:   amitriptyline  100 mg Oral QHS    [START ON 5/14/2024] ARIPiprazole  10 mg Oral Daily    atorvastatin  40 mg Oral QHS    carvediloL  25 mg Oral BID    ceFAZolin (Ancef) IV (PEDS and ADULTS)  1 g Intravenous Q8H    docusate sodium  100 mg Oral BID    hydrALAZINE  25 mg Oral TID    [START ON 5/14/2024] hydroCHLOROthiazide  12.5 mg Oral Daily    mirtazapine  30 mg Oral QHS    mupirocin   Nasal BID    [START ON 5/14/2024] NON FORMULARY MEDICATION 95 mg  95 mg Oral Daily    [START ON 5/14/2024] pantoprazole  40 mg Oral Daily    QUEtiapine  50 mg Oral Nightly    sertraline  100 mg Oral QHS     Continuous Infusions:   0/9% NACL & POTASSIUM CHLORIDE 20 MEQ/L   Intravenous Continuous         PRN Meds:  Current Facility-Administered Medications:     albuterol, 2 puff, Inhalation, Q4H PRN    aluminum-magnesium hydroxide-simethicone, 30 mL, Oral, Q4H PRN    methocarbamoL, 500 mg, Intravenous, Q8H PRN    morphine, 2 mg, Intravenous, Q4H PRN    ondansetron, 8 mg, Oral, Q6H PRN    prochlorperazine, 5 mg, Intravenous, Q6H PRN    senna-docusate 8.6-50 mg, 2 tablet, Oral, Nightly PRN    sumatriptan, 100 mg, Oral, Daily PRN    Review of patient's allergies indicates:   Allergen Reactions    Codeine Rash       Past Medical History:   Diagnosis Date    Anxiety     Arm heaviness     COPD (chronic obstructive pulmonary disease)     Depression     Gait abnormality     GERD (gastroesophageal reflux disease)     HLD (hyperlipidemia)     Hypertension     Insomnia     Migraines     Obesity     Stenosis of cervical spine with myelopathy      Past Surgical History:   Procedure Laterality Date     " SECTION      CHOLECYSTECTOMY      COLONOSCOPY      ESOPHAGOGASTRODUODENOSCOPY      TONSILLECTOMY         OBJECTIVE:     Vital Signs (Most Recent)  Temp: 98.4 °F (36.9 °C) (24)  Pulse: (!) 52 (24)  Resp: 14 (24)  BP: (!) 108/56 (24)  SpO2: 96 % (24)    Vital Signs Range (Last 24H):  Temp:  [97 °F (36.1 °C)-98.4 °F (36.9 °C)]   Pulse:  [52-73]   Resp:  [10-19]   BP: ()/(56-74)   SpO2:  [93 %-98 %]     Drain: serosanguinous      Physical Exam:  General Sleepy with poor effort on exam, no acute distress     Cranial Nerves PERRL, 5 to 3 mm, brisk bilaterally  EOMI  Face symmetric  Tongue midline     Motor Strength at least antigravity x 4 extremities         Sensory Nl to light touch x 4 extremities   Wound Dressing- clean, dry, and intact       Laboratory Review:    CBC without Differential:  Lab Results   Component Value Date    WBC 12.32 (H) 2024    HGB 13.9 2024    HCT 41.5 2024     2024    MCV 89.4 2024     Differential:   No results found for: "NEUTROABS", "LYMPHSABS", "BASOSABS", "MONOSABS"    Basic Metabolic Panel:  Lab Results   Component Value Date     2024    K 3.6 2024    BUN 19.3 2024     Coagulation Studies:  Lab Results   Component Value Date    INR 1.0 2024    INR 0.9 10/10/2017    PROTIME 12.9 2024    PROTIME 10.0 10/10/2017     Lab Results   Component Value Date    PTT 25.6 2024     Urinalysis:  Lab Results   Component Value Date    LEUKOCYTESUR 75 (A) 2024    UROBILINOGEN Normal 2024        ASSESSMENT/PLAN:     Ms. Hidalgo is a 53 y.o.female who has a past medical history significant for hypertension, COPD, opiate dependence on methadone, and anxiety.  She is POD #0 s/p C3-4 to C5-6 laminectomies for cervical stenosis with myelopathy.  Postoperatively, Ms. Hidalgo is sleepy due to general anesthesia, but is able to move all her extremities " well.      Plan:     1.  The patient has been admitted to a floor bed on the neurosurgery service with Q4 hr neuro checks.    2.  Continue Hemovac drain.  Continue Ancef as long as this drain remains in place.    3.  The hospitalist service has been consulted for managing her multiple medical conditions.      4.  The patient brought her home supply of methadone, which will be issued by the nurses on a daily basis per her home dose.    5.  Ms. Hidalgo is encouraged to mobilize with physical therapy and occupational therapy.    6.  After the patient's surgery, I provided an update to Ms. Hidalgo's .  All questions were answered to his satisfaction.    7.  Case management/ social work has been consulted. The patient will be discharged to home with home health or may need inpatient rehab.        8.  Arrangements have been made for Ms. Hidalgo to follow up in the neurosurgery clinic with LINDA Yarbrough on Tuesday, 05/28/2024 at 3:00 p.m.         Lilian Harding MD  Neurosurgery

## 2024-05-13 NOTE — ANESTHESIA PROCEDURE NOTES
Intubation    Date/Time: 5/13/2024 11:34 AM    Performed by: Jr Mckeon CRNA  Authorized by: Danya Paz MD    Intubation:     Induction:  Intravenous    Intubated:  Postinduction    Mask Ventilation:  Easy with oral airway    Attempts:  1    Attempted By:  CRNA    Method of Intubation:  Direct    Blade:  [place] 2    Laryngeal View Grade: Grade IIA - cords partially seen      Difficult Airway Encountered?: No      Complications:  None    Airway Device:  Oral endotracheal tube    Airway Device Size:  7.5    Style/Cuff Inflation:  Cuffed (inflated to minimal occlusive pressure)    Inflation Amount (mL):  6    Tube secured:  21    Secured at:  The lips    Placement Verified By:  Capnometry    Complicating Factors:  None    Findings Post-Intubation:  BS equal bilateral

## 2024-05-13 NOTE — OP NOTE
Neurosurgery Operative Note  [facility]      Patient Name: Brie Hidalgo  MRN: [id number]  CSN:  [id number]  : 1970  Age:53 yrs  Sex:female  Admit Date: 2024    Surgery date: 2024   Surgeons and Role:     * Lilian Harding MD - Primary  Assistant(s): AKHIL Griggs    Preop/ Preprocedure Diagnosis:  Multilevel cervical stenosis at C3-4 to C5-6 with myelopathy    Postop/ Postprocedure Diagnosis: Multilevel cervical stenosis at C3-4 to C5-6 with myelopathy    Surgery:   1)  C3-4 to C5-6 laminectomies    2)  Intraoperative neuromonitoring with SSEP, EMG, and Motor Evoked Potentials; there were noted to be improved signals post procedure      Findings:  Severe cervical stenosis, with intraoperative decompression of spinal cord     Fluids: See anesthesia record  Estimated Blood Loss:  150 cc  Anesthesia Type: General  Blood Products: none  Specimens:  * No specimens in log *    Implants/Grafts: * No implants in log *  Drain(s), Tube(s), Packing: Hemovac drain    Complications: No immediate    Preoperative Indications:   Ms. Hidalgo is a 53 y.o.female who has a past medical history significant for hypertension, COPD, opiate dependence on methadone, and anxiety.  For the last 2-3 months, the patient has been experiencing heaviness in her bilateral arms, pain across her chest, difficulty walking, and falling episodes.  Ms. Hidalgo had a normal motor and sensory neurological exam with myelopathy.  A MRI cervical spine without gadolinium demonstrated normal alignment.  There was severe stenosis with bilateral neuroforaminal narrowing at C3-4 and C4-5.  There was a left-sided disc bulge at C5-6 with left neuroforaminal narrowing.  Within the spinal cord at the C3-4 level, there was increased T2 signal, which was consistent with myelomalacia.     I discussed with Ms. Hidalgo and her family that she had severe multilevel cervical stenosis that was causing her to have neurological  symptoms.  Should the patient be involved in a car accident or fall, her risk of permanent paralysis would be much higher than average due to the small amount of reserve that she had in her cervical spinal canal.  Therefore, I recommended surgery, specifically a C3-4 to C5-6 laminectomy with intraoperative neuro monitoring.  I reviewed the risks, benefits, and alternatives of this surgery.  Ms. Hidalgo agreed to proceed.        Operative Procedure:  The patient was brought to the operating room.  General endotracheal intubation was instituted by the anesthesia team.  A Andre head clamp was applied.  The patient was then turned prone onto gel rolls with her arms and legs appropriately padded.  Intraoperative neuromonitoring with SSEP, EMG, and Motor Evoked Potentials was completed in supine position, after prone positioning, during the procedure, and postoperatively.  There were noted to be improved signals post procedure.     It was anticipated that a midline incision be made on the patient's posterior neck.  A C-arm image was performed that correctly identified the C3-4 to C5-6 levels before proceeding.  The patient's posterior neck was prepped and draped in a normal sterile fashion.  A timeout was performed that correctly identified the patient, preoperative antibiotics, and procedure.     A solution of 1% lidocaine with epinephrine was infused into the anticipated incisional site.  The initial skin incision was made with an 10 scalpel blade.  Bovie cautery was used for hemostasis and for carrying out this incision through the muscles and fascia to the spinous processes.  Cerebellar retractors were placed for visualization.  The Bovie cautery was used to dissect the soft tissues from the spinous processes and lamina.      A tim clamp was placed on a cervical spinous process that was thought to be C3.  A lateral C-arm image was performed, which correctly identified the C3 level.  At this time, the spinous  processes of C3, C4, C5, and C6 were removed with a rongeur.  Bone wax was used for hemostasis.  A curved curette was used to separate the underside of the bone from the ligamentum flavum.  A Kerrison punch was used to remove this bone in a piecemeal fashion. The ligamentum flavum was lifted up with a curved curette and then a Kerrison punch was used to remove this ligament, thereby exposing the underlying dura.     Bipolar cautery was used for hemostasis. At the conclusion of the case, there was good decompression of the spinal cord at the C3-4, C4-5, and C5-6 levels. The area was copiously irrigated.  FloSeal was used for hemostasis and then irrigated out.  The cerebellar retractors were removed.     Closure was then in order.  A medium size Hemovac drain was placed and secured into place with an 0 Vicryl suture.  The fascia was closed with interrupted 0 Vicryl sutures. The fascia was infused with a solution of 0.25% Marcaine with epinephrine.  The subcutaneous layer was closed with interrupted, buried 2-0 Vicryl sutures.  The skin was everted and closed with at 3.0 running nylon suture.  An Aquacel dressing, 4 x 4's, and Medipore tape were placed on top.     The patient was brought back into the supine position.  She was extubated without difficulty and transported to the PACU for further care.         NP Sadia Griggs was present for the entire case and assisted throughout the procedure.  She performed crucial functions including but not limited to positioning, suctioning, retraction of critical and delicate neural structurals, as well as terminal portions of wound closure.         Lilian Harding MD  Neurosurgery

## 2024-05-13 NOTE — DISCHARGE INSTRUCTIONS
POSTERIOR CERVICAL LAMINECTOMY/   LAMINOTOMY AND FORAMINOTOMY  DISCHARGE INSTRUCTIONS      1.   Keep your incision clean and dry.    Your sutures will be removed at your first postoperative follow-up appointment in 10-14 days.   Wait at least 72 hours from the time of your surgery to take a shower.  After showering, pat your incision dry.  Do not immerse your incision in water for 4-6 weeks (e.g. bath tub, hot tub, swimming pool).      2.  Activity restrictions:  No lifting greater than 15 pounds until your return appointment in 4-6 weeks.  No bending, stooping, or twisting.  No impact exercise or contact sports for at least 3 months.  To resume driving short distances (<30 minutes), you must be off of your narcotic medications and be able to comfortably brake suddenly, should the need arise.    Get up and walk.      3.  Contact your Neurosurgeon if the following occurs:  Signs and symptoms of infection, including fever above 101.5 degrees Fahrenheit and/or chills.  Redness, swelling, warmth, or drainage from the incision.  Any lasting changes in sensation, numbness, and/or tingling.  Increased weakness or increased pain.  Swelling of the foot and/or lower leg with calf pain.      Office hours Monday through Friday, 8:00 AM to 4:00 PM except for holidays. There is an answering service available during non-office hours, with 24 hours neurosurgery coverage.  Report to the Emergency Department if you need immediate medical assistance.      Please contact Dr. Harding's office for any questions or concerns.  Typically, your first follow-up appointment after a posterior cervical laminectomy/ laminotomy and foraminotomy surgery is 10-14 days from the date of your operation.  At this time, sutures will be removed.

## 2024-05-14 LAB
ANION GAP SERPL CALC-SCNC: 7 MEQ/L
BASOPHILS # BLD AUTO: 0.04 X10(3)/MCL
BASOPHILS NFR BLD AUTO: 0.4 %
BUN SERPL-MCNC: 9.9 MG/DL (ref 9.8–20.1)
CALCIUM SERPL-MCNC: 8.3 MG/DL (ref 8.4–10.2)
CHLORIDE SERPL-SCNC: 110 MMOL/L (ref 98–107)
CO2 SERPL-SCNC: 27 MMOL/L (ref 22–29)
CREAT SERPL-MCNC: 0.78 MG/DL (ref 0.55–1.02)
CREAT/UREA NIT SERPL: 13
EOSINOPHIL # BLD AUTO: 0.01 X10(3)/MCL (ref 0–0.9)
EOSINOPHIL NFR BLD AUTO: 0.1 %
ERYTHROCYTE [DISTWIDTH] IN BLOOD BY AUTOMATED COUNT: 14.4 % (ref 11.5–17)
GFR SERPLBLD CREATININE-BSD FMLA CKD-EPI: >60 ML/MIN/1.73/M2
GLUCOSE SERPL-MCNC: 128 MG/DL (ref 74–100)
HCT VFR BLD AUTO: 37.2 % (ref 37–47)
HGB BLD-MCNC: 12.4 G/DL (ref 12–16)
IMM GRANULOCYTES # BLD AUTO: 0.04 X10(3)/MCL (ref 0–0.04)
IMM GRANULOCYTES NFR BLD AUTO: 0.4 %
LYMPHOCYTES # BLD AUTO: 1.66 X10(3)/MCL (ref 0.6–4.6)
LYMPHOCYTES NFR BLD AUTO: 15.3 %
MCH RBC QN AUTO: 30.5 PG (ref 27–31)
MCHC RBC AUTO-ENTMCNC: 33.3 G/DL (ref 33–36)
MCV RBC AUTO: 91.4 FL (ref 80–94)
MONOCYTES # BLD AUTO: 0.41 X10(3)/MCL (ref 0.1–1.3)
MONOCYTES NFR BLD AUTO: 3.8 %
NEUTROPHILS # BLD AUTO: 8.72 X10(3)/MCL (ref 2.1–9.2)
NEUTROPHILS NFR BLD AUTO: 80 %
NRBC BLD AUTO-RTO: 0 %
PLATELET # BLD AUTO: 310 X10(3)/MCL (ref 130–400)
PMV BLD AUTO: 9.2 FL (ref 7.4–10.4)
POTASSIUM SERPL-SCNC: 4.4 MMOL/L (ref 3.5–5.1)
RBC # BLD AUTO: 4.07 X10(6)/MCL (ref 4.2–5.4)
SODIUM SERPL-SCNC: 144 MMOL/L (ref 136–145)
WBC # SPEC AUTO: 10.88 X10(3)/MCL (ref 4.5–11.5)

## 2024-05-14 PROCEDURE — 36415 COLL VENOUS BLD VENIPUNCTURE: CPT | Performed by: NEUROLOGICAL SURGERY

## 2024-05-14 PROCEDURE — 11000001 HC ACUTE MED/SURG PRIVATE ROOM

## 2024-05-14 PROCEDURE — 97162 PT EVAL MOD COMPLEX 30 MIN: CPT

## 2024-05-14 PROCEDURE — 25000003 PHARM REV CODE 250: Performed by: NURSE PRACTITIONER

## 2024-05-14 PROCEDURE — 25000003 PHARM REV CODE 250: Performed by: INTERNAL MEDICINE

## 2024-05-14 PROCEDURE — 63600175 PHARM REV CODE 636 W HCPCS: Performed by: NEUROLOGICAL SURGERY

## 2024-05-14 PROCEDURE — 97166 OT EVAL MOD COMPLEX 45 MIN: CPT

## 2024-05-14 PROCEDURE — 25000003 PHARM REV CODE 250: Performed by: NEUROLOGICAL SURGERY

## 2024-05-14 PROCEDURE — 80048 BASIC METABOLIC PNL TOTAL CA: CPT | Performed by: NEUROLOGICAL SURGERY

## 2024-05-14 PROCEDURE — 99024 POSTOP FOLLOW-UP VISIT: CPT | Mod: ,,, | Performed by: NEUROLOGICAL SURGERY

## 2024-05-14 PROCEDURE — 85025 COMPLETE CBC W/AUTO DIFF WBC: CPT | Performed by: NEUROLOGICAL SURGERY

## 2024-05-14 RX ORDER — METHADONE HYDROCHLORIDE 10 MG/ML
95 CONCENTRATE ORAL DAILY
Status: DISCONTINUED | OUTPATIENT
Start: 2024-05-14 | End: 2024-05-14

## 2024-05-14 RX ORDER — METHADONE HYDROCHLORIDE 10 MG/ML
95 CONCENTRATE ORAL DAILY
Status: DISCONTINUED | OUTPATIENT
Start: 2024-05-15 | End: 2024-05-16 | Stop reason: HOSPADM

## 2024-05-14 RX ORDER — METHOCARBAMOL 500 MG/1
500 TABLET, FILM COATED ORAL 3 TIMES DAILY
Status: DISCONTINUED | OUTPATIENT
Start: 2024-05-14 | End: 2024-05-15

## 2024-05-14 RX ORDER — ACETAMINOPHEN 325 MG/1
650 TABLET ORAL 3 TIMES DAILY
Status: DISCONTINUED | OUTPATIENT
Start: 2024-05-14 | End: 2024-05-16 | Stop reason: HOSPADM

## 2024-05-14 RX ORDER — GABAPENTIN 100 MG/1
100 CAPSULE ORAL 3 TIMES DAILY
Status: DISCONTINUED | OUTPATIENT
Start: 2024-05-14 | End: 2024-05-15

## 2024-05-14 RX ADMIN — QUETIAPINE FUMARATE 50 MG: 25 TABLET ORAL at 08:05

## 2024-05-14 RX ADMIN — DEXTROSE MONOHYDRATE 1 G: 5 INJECTION INTRAVENOUS at 05:05

## 2024-05-14 RX ADMIN — ATORVASTATIN CALCIUM 40 MG: 40 TABLET, FILM COATED ORAL at 08:05

## 2024-05-14 RX ADMIN — ACETAMINOPHEN 650 MG: 325 TABLET, FILM COATED ORAL at 10:05

## 2024-05-14 RX ADMIN — METHADONE HYDROCHLORIDE 95 MG: 10 CONCENTRATE ORAL at 09:05

## 2024-05-14 RX ADMIN — MUPIROCIN: 20 OINTMENT TOPICAL at 09:05

## 2024-05-14 RX ADMIN — METHOCARBAMOL 500 MG: 500 TABLET ORAL at 03:05

## 2024-05-14 RX ADMIN — CARVEDILOL 25 MG: 12.5 TABLET, FILM COATED ORAL at 08:05

## 2024-05-14 RX ADMIN — DEXTROSE MONOHYDRATE 1 G: 5 INJECTION INTRAVENOUS at 12:05

## 2024-05-14 RX ADMIN — DEXTROSE MONOHYDRATE 1 G: 5 INJECTION INTRAVENOUS at 10:05

## 2024-05-14 RX ADMIN — HYDROCODONE BITARTRATE AND ACETAMINOPHEN 1 TABLET: 5; 325 TABLET ORAL at 04:05

## 2024-05-14 RX ADMIN — GABAPENTIN 100 MG: 100 CAPSULE ORAL at 03:05

## 2024-05-14 RX ADMIN — DOCUSATE SODIUM 100 MG: 100 CAPSULE, LIQUID FILLED ORAL at 09:05

## 2024-05-14 RX ADMIN — MUPIROCIN: 20 OINTMENT TOPICAL at 08:05

## 2024-05-14 RX ADMIN — GABAPENTIN 100 MG: 100 CAPSULE ORAL at 08:05

## 2024-05-14 RX ADMIN — ACETAMINOPHEN 650 MG: 325 TABLET, FILM COATED ORAL at 03:05

## 2024-05-14 RX ADMIN — HYDRALAZINE HYDROCHLORIDE 25 MG: 25 TABLET ORAL at 09:05

## 2024-05-14 RX ADMIN — METHOCARBAMOL 500 MG: 500 TABLET ORAL at 10:05

## 2024-05-14 RX ADMIN — HYDRALAZINE HYDROCHLORIDE 25 MG: 25 TABLET ORAL at 08:05

## 2024-05-14 RX ADMIN — HYDROCHLOROTHIAZIDE 12.5 MG: 12.5 TABLET ORAL at 09:05

## 2024-05-14 RX ADMIN — SERTRALINE HYDROCHLORIDE 100 MG: 50 TABLET ORAL at 08:05

## 2024-05-14 RX ADMIN — METHOCARBAMOL 500 MG: 500 TABLET ORAL at 08:05

## 2024-05-14 RX ADMIN — HYDRALAZINE HYDROCHLORIDE 25 MG: 25 TABLET ORAL at 03:05

## 2024-05-14 RX ADMIN — ACETAMINOPHEN 650 MG: 325 TABLET, FILM COATED ORAL at 08:05

## 2024-05-14 RX ADMIN — PANTOPRAZOLE SODIUM 40 MG: 40 TABLET, DELAYED RELEASE ORAL at 09:05

## 2024-05-14 RX ADMIN — GABAPENTIN 100 MG: 100 CAPSULE ORAL at 10:05

## 2024-05-14 RX ADMIN — CARVEDILOL 25 MG: 12.5 TABLET, FILM COATED ORAL at 09:05

## 2024-05-14 RX ADMIN — METHOCARBAMOL 500 MG: 100 INJECTION INTRAMUSCULAR; INTRAVENOUS at 04:05

## 2024-05-14 NOTE — PLAN OF CARE
Problem: Physical Therapy  Goal: Physical Therapy Goal  Description: Goals to be met by: 24     Patient will increase functional independence with mobility by performin. Supine to sit with [place]  2. Sit to supine with [place]  3. Sit to stand transfer with Modified [place]  4. Gait  x 400 feet with Modified [place] using LRAD  5. Ascend/descend 3 stairs with left Handrails & Modified [place]     Outcome: Progressing

## 2024-05-14 NOTE — PT/OT/SLP EVAL
Physical Therapy Evaluation    Patient Name:  Brie Hidalgo   MRN:  [id number]    Recommendations:     Discharge therapy intensity: No Therapy Indicated   Discharge Equipment Recommendations: walker, rolling   Barriers to discharge: None    Assessment:     Brie Hidalgo is a 53 y.o. female admitted with a medical diagnosis of  cervical myelopathy s/p C3-4 to C5-6 laminectomy. Prior to admit, patient was independent.  She presents with the following impairments/functional limitations: weakness, gait instability, decreased safety awareness, pain. She required MIN A for bed mobility. Ambulated 210 ft with RW & CGA. No major LOB. Patient to be seen 1-2 more sessions to ensure safety with mobility. Progress as tolerated.     Rehab Prognosis: Good; patient would benefit from acute skilled PT services to address these deficits and reach maximum level of function.    Recent Surgery: Procedure(s) (LRB):  DECOMPRESSION, SPINE, CERVICAL, POSTERIOR APPROACH (N/A) 1 Day Post-Op    Plan:     During this hospitalization, patient would benefit from acute PT services 6 x/week to address the identified rehab impairments via gait training, therapeutic activities, therapeutic exercises and progress toward the following goals:    Plan of Care Expires:  06/14/24    Subjective     Chief Complaint: pain  Patient/Family Comments/goals: none  Pain/Comfort:  Pain Rating 1: 10/10  Location 1: neck  Pain Addressed 1: Reposition, Distraction  Pain Rating Post-Intervention 1: 10/10    Patients cultural, spiritual, Baptist conflicts given the current situation: no    Living Environment:  Lives with spouse in a [facility] with 3 steps (rail on left) to enter.   Prior to admission, patients level of function was independent.  Equipment used at home: rollator.  DME owned (not currently used):  rollator .  Upon discharge, patient will have assistance from spouse.    Objective:     Communicated with nurse prior to session.  Patient found supine with  hemovac  upon PT entry to room.    General Precautions: Standard, fall  Orthopedic Precautions:spinal precautions   Braces: N/A  Respiratory Status: Room air    Exams:  Cognitive Exam:  Patient is oriented to Person, Place, Time, and Situation  Sensation: -       Intact  RLE ROM: WFL  RLE Strength: WFL  LLE ROM: WFL  LLE Strength: WFL  Skin integrity: Visible skin intact      Functional Mobility:  Bed Mobility:  Supine to Sit: minimum assistance  Transfers:  Sit to Stand:  contact guard assistance with rolling walker  Gait: 210 ft with RW & CGA.   Balance: fair      AM-PAC 6 CLICK MOBILITY  Total Score:18     Education Provided:  Role and goals of PT, transfer training, bed mobility, gait training, balance training, safety awareness, assistive device, strengthening exercises, and importance of participating in PT to return to PLOF.    Patient left up in chair with all lines intact, call button in reach, spouse present, and educated on calling for assistance when ready to return to bed .    GOALS:   Multidisciplinary Problems       Physical Therapy Goals          Problem: Physical Therapy    Goal Priority Disciplines Outcome Goal Variances Interventions   Physical Therapy Goal     PT, PT/OT Progressing     Description: Goals to be met by: 24     Patient will increase functional independence with mobility by performin. Supine to sit with [place]  2. Sit to supine with [place]  3. Sit to stand transfer with Modified [place]  4. Gait  x 400 feet with Modified [place] using LRAD  5. Ascend/descend 3 stairs with left Handrails & Modified [place]                          History:     Past Medical History:   Diagnosis Date    Anxiety     Arm heaviness     COPD (chronic obstructive pulmonary disease)     Depression     Gait abnormality     GERD (gastroesophageal reflux disease)     HLD (hyperlipidemia)     Hypertension     Insomnia     Migraines     Obesity     Stenosis of cervical spine  with myelopathy        Past Surgical History:   Procedure Laterality Date     SECTION      CHOLECYSTECTOMY      COLONOSCOPY      DECOMPRESSION OF CERVICAL SPINE BY POSTERIOR APPROACH N/A 2024    Procedure: DECOMPRESSION, SPINE, CERVICAL, POSTERIOR APPROACH;  Surgeon: Lilian Harding MD;  Location: [facility];  Service: Neurosurgery;  Laterality: N/A;  C3-4, C4-5, C5-6 laminectomy // NTI // TIVA setup    ESOPHAGOGASTRODUODENOSCOPY      TONSILLECTOMY         Time Tracking:     PT Received On: 24  PT Start Time: 1022     PT Stop Time: 1035  PT Total Time (min): 13 min     Billable Minutes: Evaluation 13 minutes      2024

## 2024-05-14 NOTE — PLAN OF CARE
Problem: Occupational Therapy  Goal: Occupational Therapy Goal  Description: Goals to be met by 6/11/24:    Pt will complete grooming standing at sink with LRAD with SBA.  Pt will complete UB dressing with SBA.  Pt will complete LB dressing with SBA using LRAD.  Pt will complete toileting with SBA using LRAD.  Pt will complete functional mobility to/from toilet and toilet transfer with SBA using LRAD.   Outcome: Progressing

## 2024-05-14 NOTE — PLAN OF CARE
05/14/24 1333   Discharge Assessment   Assessment Type Discharge Planning Assessment   Confirmed/corrected address, phone number and insurance Yes   Confirmed Demographics Correct on Facesheet   Source of Information patient   Communicated MELISSA with patient/caregiver Yes   Reason For Admission c-spine stenosis   People in Home spouse   Do you expect to return to your current living situation? Yes   Do you have help at home or someone to help you manage your care at home? Yes   Who are your caregiver(s) and their phone number(s)? daniel waters, spouse, [phone]   Prior to hospitilization cognitive status: Alert/Oriented   Current cognitive status: Alert/Oriented   Home Accessibility stairs to enter home   Number of Stairs, Main Entrance three   Stair Railings, Main Entrance railings safe and in good condition   Home Layout Able to live on 1st floor   Equipment Currently Used at Home rollator   Readmission within 30 days? Yes   Patient currently being followed by outpatient case management? No   Do you currently have service(s) that help you manage your care at home? No   Do you take prescription medications? Yes   Do you have prescription coverage? Yes   Coverage [facility]   Do you have any problems affording any of your prescribed medications? No   Is the patient taking medications as prescribed? yes   Who is going to help you get home at discharge? family   How do you get to doctors appointments? family or friend will provide;car, drives self   Are you on dialysis? No   Do you take coumadin? No   Discharge Plan A Home   Discharge Plan B Home   DME Needed Upon Discharge  walker, rolling   Discharge Plan discussed with: Patient   Transition of Care Barriers None   OTHER   Name(s) of People in Home spouse     Completed assessment with pt at bedside, family present. Introduced self and explained role as SW. Pt verb understanding to all questions asked. PCP is RUY Parmar and pharmacy is [facility] in  [place] requested meds be sent to [facility] retail pharmacy at d/c. Updated chart. SW ordered RW from [facility] to be delivered to pt at bedside.    Peggy Slaughter LCSW

## 2024-05-14 NOTE — PT/OT/SLP EVAL
Occupational Therapy  Evaluation    Name: Brie Hidalgo  MRN: [id number]  Admitting Diagnosis: cervical myelopathy s/p C3-4 to C5-6 laminectomy   Recent Surgery: Procedure(s) (LRB):  DECOMPRESSION, SPINE, CERVICAL, POSTERIOR APPROACH (N/A) 1 Day Post-Op    Recommendations:     Discharge therapy intensity: No Therapy Indicated   Discharge Equipment Recommendations:  walker, rolling  Barriers to discharge:  None    Assessment:     Brie Hidalgo is a 53 y.o. female with a medical diagnosis of cervical myelopathy s/p C3-4 to C5-6 laminectomy. Pt reported prior to admit she was IND c ADLs and mobility without AD. She presents with the following performance deficits affecting function: weakness, impaired endurance, impaired self care skills, orthopedic precautions, impaired functional mobility, impaired balance, pain. Pt mostly limited by pain during session. OT to follow up for 1-2 more sessions to ensure safety and IND c ADLs. Pt reported her spouse and son will be able to assist at d/c.    Rehab Prognosis: Good; patient would benefit from acute skilled OT services to address these deficits and reach maximum level of function.       Plan:     Patient to be seen 5 x/week to address the above listed problems via self-care/home management, therapeutic activities, therapeutic exercises  Plan of Care Expires: 06/11/24  Plan of Care Reviewed with: patient, spouse    Subjective     Chief Complaint: Surgical neck pain   Patient/Family Comments/goals: To go home     Occupational Profile:  Living Environment: Pt lives c her spouse and son in  [facility] c 3 stairs and a L rail to enter. Reported she has a tub/shower.  Previous level of function: IND c ADLs and mobility without AD   Roles and Routines: Mother, spouse   Equipment Used at Home: none (Pt has access to rollatory and shower chair)  Assistance upon Discharge: Pt reported her son and spouse will be able to assist at d/c,     Pain/Comfort:  Pain Rating 1: 10/10  Location 1:  neck  Pain Addressed 1: Reposition, Distraction    Patients cultural, spiritual, Roman Catholic conflicts given the current situation: no    Objective:     OT communicated with RN prior to session.      Patient was found supine with hemovac upon OT entry to room.    General Precautions: Standard, fall  Orthopedic Precautions: spinal precautions  Braces: N/A    Bed Mobility:    Patient completed Scooting/Bridging with stand by assistance  Patient completed Supine to Sit with minimum assistance    Functional Mobility/Transfers:  Patient completed Sit <> Stand Transfer with contact guard assistance  with  rolling walker   Patient completed Toilet Transfer Step Transfer technique with contact guard assistance with  rolling walker  Functional Mobility: Pt ambulated to bathroom using RW c CGA/SBA.     Activities of Daily Living:  Lower Body Dressing: contact guard assistance to thread pants on BLE and adjust in standing. SBA to don slip on shoes.     Functional Cognition:  Intact    Visual Perceptual Skills:  Intact    Upper Extremity Function:  Right Upper Extremity:   WFL  Sensation: WFL    Left Upper Extremity:  WFL  Sensation: WFL      Therapeutic Positioning  Risk for acquired pressure injuries is decreased due to ability to get to BSC/toilet with assist.    OT interventions performed during the course of today's session:   Education was provided on benefits of and recommendations for therapeutic positioning    Skin assessment: all bony prominences were assessed    Findings: no redness or breakdown noted    OT recommendations for therapeutic positioning throughout hospitalization:   Follow [facility] Pressure Injury Prevention Protocol      Patient Education:  Patient and spouse were provided with verbal education education regarding OT role/goals/POC, post op precautions, and fall prevention.  Understanding was verbalized.     Patient left up in chair with all lines intact and call button in reach.    GOALS:    Multidisciplinary Problems       Occupational Therapy Goals          Problem: Occupational Therapy    Goal Priority Disciplines Outcome Interventions   Occupational Therapy Goal     OT, PT/OT Progressing    Description: Goals to be met by 24:    Pt will complete grooming standing at sink with LRAD with SBA.  Pt will complete UB dressing with SBA.  Pt will complete LB dressing with SBA using LRAD.  Pt will complete toileting with SBA using LRAD.  Pt will complete functional mobility to/from toilet and toilet transfer with SBA using LRAD.                        History:     Past Medical History:   Diagnosis Date    Anxiety     Arm heaviness     COPD (chronic obstructive pulmonary disease)     Depression     Gait abnormality     GERD (gastroesophageal reflux disease)     HLD (hyperlipidemia)     Hypertension     Insomnia     Migraines     Obesity     Stenosis of cervical spine with myelopathy          Past Surgical History:   Procedure Laterality Date     SECTION      CHOLECYSTECTOMY      COLONOSCOPY      DECOMPRESSION OF CERVICAL SPINE BY POSTERIOR APPROACH N/A 2024    Procedure: DECOMPRESSION, SPINE, CERVICAL, POSTERIOR APPROACH;  Surgeon: Lilian Harding MD;  Location: [facility];  Service: Neurosurgery;  Laterality: N/A;  C3-4, C4-5, C5-6 laminectomy // NTI // TIVA setup    ESOPHAGOGASTRODUODENOSCOPY      TONSILLECTOMY         Time Tracking:     OT Date of Treatment: 24  OT Start Time: 1009  OT Stop Time: 1025  OT Total Time (min): 16 min    Billable Minutes:Evaluation Moderate complexity     2024

## 2024-05-14 NOTE — CONSULTS
[facility]  Hospital Medicine - H&P Note    Patient Name: Brie Hidalgo  : 1970  MRN: [id number]  PCP: Lisa Yang FNP  Admitting Physician:  YASHIRA Harding MD  Consulting Physician: RALF James MD  Admission Class: IP- Inpatient   Code status: Full  Reason for consult: medical management     Allergies   Codeine    Chief Complaint   Neck pain    History of Present Illness   53 yr old female whose history includes HTN, COPD and cervical stenosis. S/P cervical laminectomy 24. Hospital medicine consulted for management of co-morbidities. Bradycardic with HR 52-73, asymptomatic, and otherwise VS stable. At the time of my exam patient is AAO x 3. Moving all 4 extremities.     ROS   Except as documented, all other systems reviewed and negative     Past Medical History   Hypertension  COPD  Dyslipidemia  Depression/anxiety  GERD  Migraines  Cervical stenosis    Past Surgical History     Cholecystectomy    Social History   Current everyday smoker, at least a PPD for last 30 yrs. Denies alcohol or illicit drug use.     Family History   Reviewed and negative    Home Medications     Prior to Admission medications    Medication Sig Start Date End Date Taking? Authorizing Provider       Yes Provider, Historical   atorvastatin (LIPITOR) 40 MG tablet Take 40 mg by mouth every evening.   Yes Provider, Historical   carvediloL (COREG) 25 MG tablet Take 25 mg by mouth 2 (two) times daily.   Yes Provider, Historical   ergocalciferol (ERGOCALCIFEROL) 50,000 unit Cap Take 50,000 Units by mouth 3 (three) times a week.   Yes Provider, Historical       Yes Provider, Historical   hydrALAZINE (APRESOLINE) 25 MG tablet Take 25 mg by mouth 3 (three) times daily.   Yes Provider, Historical   hydroCHLOROthiazide (MICROZIDE) 12.5 mg capsule Take 12.5 mg by mouth every morning.   Yes Provider, Historical       Yes Provider, Historical       Yes Provider, Historical   omeprazole (PRILOSEC) 40 MG capsule Take  "40 mg by mouth every morning.   Yes Provider, Historical   sertraline (ZOLOFT) 100 MG tablet Take 100 mg by mouth every evening.   Yes Provider, Historical   albuterol (PROVENTIL/VENTOLIN HFA) 90 mcg/actuation inhaler Inhale 2 puffs into the lungs every 4 (four) hours as needed for Shortness of Breath or Wheezing.    Provider, Historical        Provider, Historical   hydrOXYzine pamoate (VISTARIL) 25 MG Cap Take 25 mg by mouth 2 (two) times daily.  Patient not taking: Reported on 5/7/2024    Provider, Historical   ibuprofen (ADVIL,MOTRIN) 800 MG tablet Take 800 mg by mouth 3 (three) times daily as needed for Pain.    Provider, Historical        Zi Fan MD   ondansetron (ZOFRAN) 8 MG tablet Take 1 tablet (8 mg total) by mouth every 8 (eight) hours as needed for Nausea. 1/2/24   Jadon Gray MD   QUEtiapine (SEROQUEL) 50 MG tablet Take 50 mg by mouth nightly.    Provider, Historical   sumatriptan (IMITREX) 100 MG tablet Take 100 mg by mouth daily as needed.    Provider, Historical    CYCLOBENZAPRINE 10 MG TID PRN    PATIENT BROUGHT HOME MEDS AND ABOVE IS ACCURATE.   Physical Exam   Vital Signs  Temp:  [97 °F (36.1 °C)-98.4 °F (36.9 °C)]   Pulse:  [52-73]   Resp:  [10-19]   BP: ()/(56-74)   SpO2:  [93 %-98 %]    General: Appears comfortable  HEENT: NC/AT  Neck:  No JVD, dressing to posterior neck clean, dry and intact. CHELSEY drain intact.   Chest: CTABL  CVS: Regular rhythm. Normal S1/S2.  Abdomen: nondistended, normoactive BS, soft and non-tender.  MSK: No obvious deformity or joint swelling  Skin: Warm and dry  Neuro: AAOx3, no focal neurological deficit  Psych: Cooperative    Labs     No results for input(s): "WBC", "RBC", "HGB", "HCT", "MCV", "MCH", "MCHC", "RDW", "PLT", "ABSNEUT", "SEDRATE", "HSCRP", "CRP" in the last 72 hours.  No results for input(s): "PROTIME", "INR", "PTT", "D-DIMER", "FERRITIN", "IRON", "TRANS", "TIBC", "LABIRON", "[id number]", "FOLATE", "LDH", "HAPTOGLOBIN", "RETICCNTAUTO", " ""RETABS", "PERIPSMEAREV" in the last 72 hours.   No results for input(s): "NA", "K", "CHLORIDE", "CO2", "BUN", "CREATININE", "EGFRNORACEVR", "GLUCOSE", "CALCIUM", "MG", "PHOS", "ALBUMIN", "GLOBULIN", "ALKPHOS", "ALT", "AST", "BILITOT", "BILIDIR", "LIPASE", "HGBA1C", "CHOL", "TRIG", "LDL", "HDL", "TSH", "FREET4", "BNP" in the last 72 hours.  No results for input(s): "LACTIC" in the last 72 hours.  No results for input(s): "CPK", "TROPONINI" in the last 72 hours.     Microbiology Results (last 7 days)       ** No results found for the last 168 hours. **           Imaging   SURG FL Surgery Fluoro Usage  See OP Notes for results.     IMPRESSION: See OP Notes for results.     This procedure was auto-finalized by: Virtual Radiologist    Assessment & Plan     Hypertension - stable  - home meds resumed    COPD - stable  - continues to smoke and smoking cessation reinforced  - Mila PRN    Cervical stenosis - s/p Cervical laminectomy - C3-4, C5-6 - 5/13/24  - per neurosurgery    Chronic opioid use on methadone     VTE Prophylaxis: Dilia HUIZAR, Lana Suarez, [facility] have discussed this patients case with Dr. Jmaes who agrees with the diagnosis and treatment plan.    ___________________________________________________________________  I, Dr. Mao James assumed care of this patient.  For the patient encounter, I performed the substantive portion of the visit, I reviewed the NPPA documentation, treatment plan, and medical decision making.  I had face to face time with this patient.  I have personally reviewed the labs and test results that are presently available. I have reviewed or attempted to review medical records based upon their availability. If patient was admitted under observational status it is with my approval.      53-year-old female underwent a cervical laminectomy, she has history as above including chronic opioid use on methadone.  Benign abdomen cardiovascular regular rate and rhythm and agree with " above.  Continue home meds as appropriate will following consultation.    Time seen:  11:00 p.m. 5/13  Mao James MD

## 2024-05-14 NOTE — PROGRESS NOTES
Hospital Progress Note  [facility] Neurosurgery      Admit Date: 2024  Post-operative Day: 1 Day Post-Op  Hospital Day: 2    SUBJECTIVE:     POD #1 s/p C3-4 to C5-6 laminectomies     Interval History:  I evaluated Ms. Hidalgo with her  at the bedside.  She is sitting up in bed.  The patient has incisional pain in her neck, but no longer has heaviness in her bilateral arms or pain across her chest postoperatively.  Ms. Hidalgo has not yet ambulated.    Scheduled Meds:   atorvastatin  40 mg Oral QHS    carvediloL  25 mg Oral BID    ceFAZolin (Ancef) IV (PEDS and ADULTS)  1 g Intravenous Q8H    docusate sodium  100 mg Oral BID    hydrALAZINE  25 mg Oral TID    hydroCHLOROthiazide  12.5 mg Oral Daily    mupirocin   Nasal BID    pantoprazole  40 mg Oral Daily    QUEtiapine  50 mg Oral Nightly    sertraline  100 mg Oral QHS     Continuous Infusions:  PRN Meds:  Current Facility-Administered Medications:     albuterol-ipratropium, 3 mL, Nebulization, Q4H PRN    aluminum-magnesium hydroxide-simethicone, 30 mL, Oral, Q4H PRN    HYDROcodone-acetaminophen, 1 tablet, Oral, Q6H PRN    methocarbamoL, 500 mg, Intravenous, Q8H PRN    morphine, 2 mg, Intravenous, Q4H PRN    ondansetron, 8 mg, Oral, Q6H PRN    prochlorperazine, 5 mg, Intravenous, Q6H PRN    senna-docusate 8.6-50 mg, 2 tablet, Oral, Nightly PRN    sumatriptan, 100 mg, Oral, Daily PRN    Review of patient's allergies indicates:   Allergen Reactions    Codeine Rash       Past Medical History:   Diagnosis Date    Anxiety     Arm heaviness     COPD (chronic obstructive pulmonary disease)     Depression     Gait abnormality     GERD (gastroesophageal reflux disease)     HLD (hyperlipidemia)     Hypertension     Insomnia     Migraines     Obesity     Stenosis of cervical spine with myelopathy      Past Surgical History:   Procedure Laterality Date     SECTION      CHOLECYSTECTOMY      COLONOSCOPY      ESOPHAGOGASTRODUODENOSCOPY       "TONSILLECTOMY         OBJECTIVE:     Vital Signs (Most Recent)  Temp: 98.3 °F (36.8 °C) (05/14/24 0725)  Pulse: 61 (05/14/24 0725)  Resp: 18 (05/14/24 0744)  BP: 115/74 (05/14/24 0725)  SpO2: 95 % (05/14/24 0725)    Vital Signs Range (Last 24H):  Temp:  [97 °F (36.1 °C)-98.4 °F (36.9 °C)]   Pulse:  [52-78]   Resp:  [10-19]   BP: ()/(56-74)   SpO2:  [90 %-98 %]     Drain: 45 cc/ 24 hrs since surgery, 30 cc/ 8 hrs, serosanguinous      Physical Exam:  General Conversant, no acute distress      Motor Strength is 5/5 x 4 extremities          Sensory Nl to light touch x 4 extremities     Wound Dressing- clean, dry, and intact       Laboratory Review:    CBC without Differential:  Lab Results   Component Value Date    WBC 10.88 05/14/2024    HGB 12.4 05/14/2024    HCT 37.2 05/14/2024     05/14/2024    MCV 91.4 05/14/2024     Differential:   No results found for: "NEUTROABS", "LYMPHSABS", "BASOSABS", "MONOSABS"    Basic Metabolic Panel:  Lab Results   Component Value Date     05/14/2024    K 4.4 05/14/2024    BUN 9.9 05/14/2024     Coagulation Studies:  Lab Results   Component Value Date    INR 1.0 04/27/2024    INR 0.9 10/10/2017    PROTIME 12.9 04/27/2024    PROTIME 10.0 10/10/2017     Lab Results   Component Value Date    PTT 25.6 04/28/2024     Urinalysis:  Lab Results   Component Value Date    LEUKOCYTESUR 75 (A) 04/28/2024    UROBILINOGEN Normal 04/28/2024        ASSESSMENT/PLAN:     Ms. Hidalgo is a 53 y.o.female who has a past medical history significant for hypertension, COPD, opiate dependence on methadone, and anxiety.  She is POD #1 s/p C3-4 to C5-6 laminectomies for cervical stenosis with myelopathy.  Postoperatively, Ms. Hidalgo is doing very well with a normal motor and sensory neurological exam.         Plan:      1.  The patient has been admitted to a floor bed on the neurosurgery service with Q4 hr neuro checks.     2.  Continue Hemovac drain.  Continue Ancef as long as this drain remains " in place.     3.  Recommendations from the hospitalist consult service are appreciated.      4.  The patient brought her home supply of methadone, which will be issued by the nurses on a daily basis per her home dose.     5.  Ms. Hidalgo is encouraged to mobilize with physical therapy and occupational therapy.     6.  Case management/ social work has been consulted. The patient will be discharged to home with home health or may need inpatient rehab.         7.  Arrangements have been made for Ms. Hidalgo to follow up in the neurosurgery clinic with LINDA Yarbrough on Tuesday, 05/28/2024 at 3:00 p.m.         Lilian Harding MD  Neurosurgery

## 2024-05-15 PROCEDURE — 11000001 HC ACUTE MED/SURG PRIVATE ROOM

## 2024-05-15 PROCEDURE — 25000003 PHARM REV CODE 250: Performed by: NEUROLOGICAL SURGERY

## 2024-05-15 PROCEDURE — 99024 POSTOP FOLLOW-UP VISIT: CPT | Mod: ,,, | Performed by: NEUROLOGICAL SURGERY

## 2024-05-15 PROCEDURE — 25000003 PHARM REV CODE 250: Performed by: INTERNAL MEDICINE

## 2024-05-15 PROCEDURE — 97530 THERAPEUTIC ACTIVITIES: CPT

## 2024-05-15 PROCEDURE — S0109 METHADONE ORAL 5MG: HCPCS | Performed by: NEUROLOGICAL SURGERY

## 2024-05-15 PROCEDURE — 63600175 PHARM REV CODE 636 W HCPCS: Performed by: NEUROLOGICAL SURGERY

## 2024-05-15 RX ORDER — CARVEDILOL 12.5 MG/1
12.5 TABLET ORAL 2 TIMES DAILY
Status: DISCONTINUED | OUTPATIENT
Start: 2024-05-15 | End: 2024-05-15

## 2024-05-15 RX ORDER — CARVEDILOL 3.12 MG/1
3.12 TABLET ORAL 2 TIMES DAILY
Status: DISCONTINUED | OUTPATIENT
Start: 2024-05-15 | End: 2024-05-16 | Stop reason: HOSPADM

## 2024-05-15 RX ORDER — GABAPENTIN 300 MG/1
300 CAPSULE ORAL 3 TIMES DAILY
Status: DISCONTINUED | OUTPATIENT
Start: 2024-05-15 | End: 2024-05-16 | Stop reason: HOSPADM

## 2024-05-15 RX ORDER — METHOCARBAMOL 750 MG/1
750 TABLET, FILM COATED ORAL 3 TIMES DAILY
Status: DISCONTINUED | OUTPATIENT
Start: 2024-05-15 | End: 2024-05-16 | Stop reason: HOSPADM

## 2024-05-15 RX ADMIN — METHOCARBAMOL 750 MG: 750 TABLET ORAL at 02:05

## 2024-05-15 RX ADMIN — ACETAMINOPHEN 650 MG: 325 TABLET, FILM COATED ORAL at 08:05

## 2024-05-15 RX ADMIN — CARVEDILOL 12.5 MG: 12.5 TABLET, FILM COATED ORAL at 08:05

## 2024-05-15 RX ADMIN — GABAPENTIN 100 MG: 100 CAPSULE ORAL at 08:05

## 2024-05-15 RX ADMIN — METHADONE HYDROCHLORIDE 95 MG: 10 CONCENTRATE ORAL at 08:05

## 2024-05-15 RX ADMIN — METHOCARBAMOL 500 MG: 500 TABLET ORAL at 08:05

## 2024-05-15 RX ADMIN — ATORVASTATIN CALCIUM 40 MG: 40 TABLET, FILM COATED ORAL at 09:05

## 2024-05-15 RX ADMIN — HYDROCHLOROTHIAZIDE 12.5 MG: 12.5 TABLET ORAL at 08:05

## 2024-05-15 RX ADMIN — METHOCARBAMOL 750 MG: 750 TABLET ORAL at 09:05

## 2024-05-15 RX ADMIN — DOCUSATE SODIUM 100 MG: 100 CAPSULE, LIQUID FILLED ORAL at 08:05

## 2024-05-15 RX ADMIN — CARVEDILOL 3.12 MG: 3.12 TABLET, FILM COATED ORAL at 09:05

## 2024-05-15 RX ADMIN — DOCUSATE SODIUM 100 MG: 100 CAPSULE, LIQUID FILLED ORAL at 09:05

## 2024-05-15 RX ADMIN — ACETAMINOPHEN 650 MG: 325 TABLET, FILM COATED ORAL at 09:05

## 2024-05-15 RX ADMIN — QUETIAPINE FUMARATE 50 MG: 25 TABLET ORAL at 09:05

## 2024-05-15 RX ADMIN — GABAPENTIN 300 MG: 300 CAPSULE ORAL at 09:05

## 2024-05-15 RX ADMIN — HYDRALAZINE HYDROCHLORIDE 25 MG: 25 TABLET ORAL at 08:05

## 2024-05-15 RX ADMIN — DEXTROSE MONOHYDRATE 1 G: 5 INJECTION INTRAVENOUS at 12:05

## 2024-05-15 RX ADMIN — PANTOPRAZOLE SODIUM 40 MG: 40 TABLET, DELAYED RELEASE ORAL at 08:05

## 2024-05-15 RX ADMIN — GABAPENTIN 300 MG: 300 CAPSULE ORAL at 02:05

## 2024-05-15 RX ADMIN — ACETAMINOPHEN 650 MG: 325 TABLET, FILM COATED ORAL at 02:05

## 2024-05-15 RX ADMIN — MUPIROCIN: 20 OINTMENT TOPICAL at 08:05

## 2024-05-15 RX ADMIN — SERTRALINE HYDROCHLORIDE 100 MG: 50 TABLET ORAL at 09:05

## 2024-05-15 RX ADMIN — MUPIROCIN: 20 OINTMENT TOPICAL at 09:05

## 2024-05-15 NOTE — PT/OT/SLP PROGRESS
Physical Therapy Treatment    Patient Name:  Brie Hidalgo   MRN:  [id number]    Recommendations:     Discharge therapy intensity: No Therapy Indicated   Discharge Equipment Recommendations: walker, rolling  Barriers to discharge: None    Assessment:     Brie Hidalgo is a 53 y.o. female admitted with a medical diagnosis of cervical stenosis.  She presents with the following impairments/functional limitations: pain. Patient independent with mobility. PT signing off.     Rehab Prognosis: Good; patient would benefit from acute skilled PT services to address these deficits and reach maximum level of function.    Recent Surgery: Procedure(s) (LRB):  DECOMPRESSION, SPINE, CERVICAL, POSTERIOR APPROACH (N/A) 2 Days Post-Op    Plan:     During this hospitalization, patient would benefit from acute PT services 6 x/week to address the identified rehab impairments via gait training, therapeutic activities, therapeutic exercises and progress toward the following goals:    Plan of Care Expires:  06/14/24    Subjective     Chief Complaint: pain  Patient/Family Comments/goals: none  Pain/Comfort:  Pain Rating 1: 7/10  Location 1: neck  Pain Addressed 1: Reposition, Distraction  Pain Rating Post-Intervention 1: 7/10      Objective:     Communicated with nurse prior to session.  Patient found sitting edge of bed with hemovac upon PT entry to room.     General Precautions: Standard, fall  Orthopedic Precautions: spinal precautions  Braces: N/A  Respiratory Status: Room air  Skin Integrity: Visible skin intact      Functional Mobility:  Transfers:  Sit to Stand:  modified independence with rolling walker  Gait: 405 ft independently   Balance: good    Education Provided:  Role and goals of PT, transfer training, bed mobility, gait training, balance training, safety awareness, assistive device, strengthening exercises, and importance of participating in PT to return to PLOF.    Patient left ambulatory in room/[facility] with all lines  intact and call button in reach. Spouse present    GOALS:   Multidisciplinary Problems       Physical Therapy Goals          Problem: Physical Therapy    Goal Priority Disciplines Outcome Goal Variances Interventions   Physical Therapy Goal     PT, PT/OT Progressing     Description: Goals to be met by: 24     Patient will increase functional independence with mobility by performin. Supine to sit with [place]  2. Sit to supine with [place]  3. Sit to stand transfer with Modified [place]  4. Gait  x 400 feet with Modified [place] using LRAD  5. Ascend/descend 3 stairs with left Handrails & Modified [place]                          Time Tracking:     PT Received On: 05/15/24  PT Start Time: 1037     PT Stop Time: 1050  PT Total Time (min): 13 min     Billable Minutes: Therapeutic Activity 13 minutes    Treatment Type: Treatment  PT/PTA: PT     Number of PTA visits since last PT visit: 0     05/15/2024

## 2024-05-15 NOTE — PROGRESS NOTES
[facility] Hospital Medicine Progress Note        Chief Complaint: Inpatient Follow-up for medical management     HPI:   53 yr old female whose history includes HTN, COPD and cervical stenosis, opiate dependence on methadone, and anxiety. She is S/P cervical laminectomies C3-4 to C5-6 on 5/13/24 by Dr. Lilian Harding. Hospital medicine consulted for management of co-morbidities. Bradycardic with HR 52-73, asymptomatic, and otherwise VS stable. At the time of my exam patient is AAO x 3. Moving all 4 extremities       Interval Hx:   Patient seen and examined this morning still complains of neck pain     Case was discussed with patient's nurse and  on the floor.    Objective/physical exam:  General: In no acute distress, afebrile, C-collar in place   Chest: Clear to auscultation bilaterally  Heart: bradycardia, +S1, S2, no appreciable murmur  Abdomen: Soft, nontender, BS +  MSK: Warm, no lower extremity edema, no clubbing or cyanosis  Neurologic: Alert and oriented x4, Cranial nerve II-XII intact, Moves all ext spontaneously     VITAL SIGNS: 24 HRS MIN & MAX LAST   Temp  Min: 98 °F (36.7 °C)  Max: 99.1 °F (37.3 °C) 98.1 °F (36.7 °C)   BP  Min: 92/55  Max: 116/70 103/69   Pulse  Min: 55  Max: 72  64   Resp  Min: 17  Max: 18 18   SpO2  Min: 93 %  Max: 95 % 95 %     I have reviewed the following labs:  Recent Labs   Lab 05/14/24  0400   WBC 10.88   RBC 4.07*   HGB 12.4   HCT 37.2   MCV 91.4   MCH 30.5   MCHC 33.3   RDW 14.4      MPV 9.2     Recent Labs   Lab 05/14/24  0400      K 4.4   CO2 27   BUN 9.9   CREATININE 0.78   CALCIUM 8.3*     Microbiology Results (last 7 days)       ** No results found for the last 168 hours. **             See below for Radiology    Scheduled Med:   acetaminophen  650 mg Oral TID    atorvastatin  40 mg Oral QHS    carvediloL  12.5 mg Oral BID    docusate sodium  100 mg Oral BID    gabapentin  100 mg Oral TID    hydrALAZINE  25 mg Oral  TID    hydroCHLOROthiazide  12.5 mg Oral Daily    methadone  95 mg Oral Daily    methocarbamoL  500 mg Oral TID    mupirocin   Nasal BID    pantoprazole  40 mg Oral Daily    QUEtiapine  50 mg Oral Nightly    sertraline  100 mg Oral QHS      Continuous Infusions:     PRN Meds:    Current Facility-Administered Medications:     albuterol-ipratropium, 3 mL, Nebulization, Q4H PRN    aluminum-magnesium hydroxide-simethicone, 30 mL, Oral, Q4H PRN    methocarbamoL, 500 mg, Intravenous, Q8H PRN    morphine, 2 mg, Intravenous, Q4H PRN    ondansetron, 8 mg, Oral, Q6H PRN    prochlorperazine, 5 mg, Intravenous, Q6H PRN    senna-docusate 8.6-50 mg, 2 tablet, Oral, Nightly PRN    sumatriptan, 100 mg, Oral, Daily PRN     Assessment/Plan:  Cervical stenosis , s/plaminectomy - C3-4, C5-6 - on 5/13/24   Essential HTN  Mild bradycardia due to BB therapy   COPD without exacerbation   Chronic opioid use on methadone   Anxiety   Obesity , BMI 30.4    Plan-   We will increase the dose of gabapentin to 300 p.o. t.i.d. and increase the dose of Robaxin to 750 p.o. t.i.d. and continue with home dose of methadone  Discussed this with the patient  Blood pressure on the lower side I will reduce the dose of Coreg to 3.125 b.i.d.   Post op management per Primary   DC plan per Primary   Home meds are reviewed and resumed       VTE prophylaxis: SCDS    Patient condition:  Fair     Anticipated discharge and Disposition:     DC plan per Primary     All diagnosis and differential diagnosis have been reviewed; assessment and plan has been documented; I have personally reviewed the labs and test results that are presently available; I have reviewed the patients medication list; I have reviewed the consulting providers response and recommendations. I have reviewed or attempted to review medical records based upon their availability    All of the patient's questions have been  addressed and answered. Patient's is agreeable to the above stated plan. I will  continue to monitor closely and make adjustments to medical management as needed.  _____________________________________________________________________    Nutrition Status:    Radiology:  I have personally reviewed the following imaging and agree with the radiologist.     SURG FL Surgery Fluoro Usage  See OP Notes for results.     IMPRESSION: See OP Notes for results.     This procedure was auto-finalized by: Virtual Radiologist      Nahomi Noriega MD  Department of Hospital Medicine   [facility]   05/14/2024

## 2024-05-15 NOTE — PT/OT/SLP DISCHARGE
Occupational Therapy Discharge Summary    Brie Hidalgo  MRN: [id number]   Principal Problem: cervical myelopathy s/p C3-4 to C5-6 laminectomy     Patient Discharged from acute Occupational Therapy on 5/15/24.  Please refer to prior OT note dated 5/14/24 for functional status.    Assessment:       Pt reported she has been ambulating to bathroom using RW and completing ADLs INDly. Reported she is only limited by pain. Pt has met all OT goals and in agreement c OT signing off.     Objective:     GOALS:   Multidisciplinary Problems       Occupational Therapy Goals       Not on file              Multidisciplinary Problems (Resolved)          Problem: Occupational Therapy    Goal Priority Disciplines Outcome Interventions   Occupational Therapy Goal   (Resolved)     OT, PT/OT Met    Description: Goals to be met by 6/11/24:    Pt will complete grooming standing at sink with LRAD with SBA.  Pt will complete UB dressing with SBA.  Pt will complete LB dressing with SBA using LRAD.  Pt will complete toileting with SBA using LRAD.  Pt will complete functional mobility to/from toilet and toilet transfer with SBA using LRAD.                        Reasons for Discontinuation of Therapy Services  Satisfactory goal achievement.      Plan:     Patient Discharged to: Home no OT services needed    5/15/2024

## 2024-05-15 NOTE — PROGRESS NOTES
Hospital Progress Note  [facility] Neurosurgery      Admit Date: 5/13/2024  Post-operative Day: 2 Days Post-Op  Hospital Day: 3    SUBJECTIVE:     POD #2 s/p C3-4 to C5-6 laminectomies     Interval History:  I evaluated Ms. Hidalgo with her  at the bedside, who is asleep.  The patient has severe incisional and musculoskeletal pain in her neck, but no longer has heaviness in her bilateral arms or pain across her chest postoperatively.  Ms. Hidalgo ambulated with a walker yesterday and considers her gait to be more balanced postoperatively.      Scheduled Meds:   acetaminophen  650 mg Oral TID    atorvastatin  40 mg Oral QHS    carvediloL  12.5 mg Oral BID    docusate sodium  100 mg Oral BID    gabapentin  100 mg Oral TID    hydrALAZINE  25 mg Oral TID    hydroCHLOROthiazide  12.5 mg Oral Daily    methadone  95 mg Oral Daily    methocarbamoL  500 mg Oral TID    mupirocin   Nasal BID    pantoprazole  40 mg Oral Daily    QUEtiapine  50 mg Oral Nightly    sertraline  100 mg Oral QHS     Continuous Infusions:  PRN Meds:  Current Facility-Administered Medications:     albuterol-ipratropium, 3 mL, Nebulization, Q4H PRN    aluminum-magnesium hydroxide-simethicone, 30 mL, Oral, Q4H PRN    methocarbamoL, 500 mg, Intravenous, Q8H PRN    morphine, 2 mg, Intravenous, Q4H PRN    ondansetron, 8 mg, Oral, Q6H PRN    prochlorperazine, 5 mg, Intravenous, Q6H PRN    senna-docusate 8.6-50 mg, 2 tablet, Oral, Nightly PRN    sumatriptan, 100 mg, Oral, Daily PRN    Review of patient's allergies indicates:   Allergen Reactions    Codeine Rash       Past Medical History:   Diagnosis Date    Anxiety     Arm heaviness     COPD (chronic obstructive pulmonary disease)     Depression     Gait abnormality     GERD (gastroesophageal reflux disease)     HLD (hyperlipidemia)     Hypertension     Insomnia     Migraines     Obesity     Stenosis of cervical spine with myelopathy      Past Surgical History:   Procedure Laterality Date  "    SECTION      CHOLECYSTECTOMY      COLONOSCOPY      DECOMPRESSION OF CERVICAL SPINE BY POSTERIOR APPROACH N/A 2024    Procedure: DECOMPRESSION, SPINE, CERVICAL, POSTERIOR APPROACH;  Surgeon: Lilian Harding MD;  Location: [facility];  Service: Neurosurgery;  Laterality: N/A;  C3-4, C4-5, C5-6 laminectomy // NTI // TIVA setup    ESOPHAGOGASTRODUODENOSCOPY      TONSILLECTOMY         OBJECTIVE:     Vital Signs (Most Recent)  Temp: 99.1 °F (37.3 °C) (05/15/24 0415)  Pulse: (!) 55 (05/15/24 0415)  Resp: 17 (05/15/24 0415)  BP: (!) 92/55 (05/15/24 0415)  SpO2: (!) 93 % (05/15/24 0415)    Vital Signs Range (Last 24H):  Temp:  [98 °F (36.7 °C)-99.1 °F (37.3 °C)]   Pulse:  [55-72]   Resp:  [17-18]   BP: ()/(55-74)   SpO2:  [93 %-95 %]     Drain: 50 cc/ 24 hrs, 20 cc/ 8 hrs, serosanguinous      Physical Exam:  General Conversant, no acute distress      Motor Strength is 5/5 x 4 extremities          Sensory Nl to light touch x 4 extremities      Wound Dressing- clean, dry, and intact         Laboratory Review:    CBC without Differential:  Lab Results   Component Value Date    WBC 10.88 2024    HGB 12.4 2024    HCT 37.2 2024     2024    MCV 91.4 2024     Differential:   No results found for: "NEUTROABS", "LYMPHSABS", "BASOSABS", "MONOSABS"    Basic Metabolic Panel:  Lab Results   Component Value Date     2024    K 4.4 2024    BUN 9.9 2024     Coagulation Studies:  Lab Results   Component Value Date    INR 1.0 2024    INR 0.9 10/10/2017    PROTIME 12.9 2024    PROTIME 10.0 10/10/2017     Lab Results   Component Value Date    PTT 25.6 2024     Urinalysis:  Lab Results   Component Value Date    LEUKOCYTESUR 75 (A) 2024    UROBILINOGEN Normal 2024        ASSESSMENT/PLAN:     Ms. Hidalgo is a 53 y.o.female who has a past medical history significant for hypertension, COPD, opiate dependence on methadone, and anxiety.  She " is POD #2 s/p C3-4 to C5-6 laminectomies for cervical stenosis with myelopathy.  Postoperatively, Ms. Hidalgo is doing very well with a normal motor and sensory neurological exam.  However, she is having neck pain with her current medication regimen.          Plan:      1.  The patient has been admitted to a floor bed on the neurosurgery service with Q4 hr neuro checks.     2.  Her Hemovac drain is being removed today.  Ancef has been discontinued.      3.  Recommendations from the hospitalist consult service are appreciated.      4.  The patient brought her home supply of methadone, which will be issued by the nurses on a daily basis per her home dose.     5.  Ms. Hidalgo is encouraged to continue mobilizing with physical therapy and occupational therapy.     6.  Case management/ social work has been consulted. The patient will be discharged to home with home health or may need inpatient rehab.         7.  Arrangements have been made for Ms. Hidalgo to follow up in the neurosurgery clinic with LINDA Yarbrough on Tuesday, 05/28/2024 at 3:00 p.m.           Lilian Harding MD  Neurosurgery

## 2024-05-15 NOTE — ANESTHESIA POSTPROCEDURE EVALUATION
Anesthesia Post Evaluation    Patient: Brie Hidalgo    Procedure(s) Performed: Procedure(s) (LRB):  DECOMPRESSION, SPINE, CERVICAL, POSTERIOR APPROACH (N/A)    Final Anesthesia Type: general      Patient location during evaluation: PACU  Patient participation: Yes- Able to Participate  Level of consciousness: awake and alert  Post-procedure vital signs: reviewed and stable  Pain management: adequate  Airway patency: patent      Anesthetic complications: no      Cardiovascular status: hemodynamically stable  Respiratory status: unassisted  Hydration status: euvolemic  Follow-up not needed.              Vitals Value Taken Time   /61 05/15/24 1134   Temp 36.9 °C (98.4 °F) 05/15/24 1134   Pulse 63 05/15/24 1134   Resp 19 05/15/24 1134   SpO2 93 % 05/15/24 1134         Event Time   Out of Recovery 16:50:00         Pain/Guido Score: Pain Rating Prior to Med Admin: 10 (5/15/2024  8:30 AM)  Pain Rating Post Med Admin: 2 (5/15/2024  9:29 AM)

## 2024-05-15 NOTE — PROGRESS NOTES
[facility] Hospital Medicine Progress Note        Chief Complaint: Inpatient Follow-up for medical management     HPI:   53 yr old female whose history includes HTN, COPD and cervical stenosis, opiate dependence on methadone, and anxiety. She is S/P cervical laminectomies C3-4 to C5-6 on 5/13/24 by Dr. Lilian Harding. Hospital medicine consulted for management of co-morbidities. Bradycardic with HR 52-73, asymptomatic, and otherwise VS stable. At the time of my exam patient is AAO x 3. Moving all 4 extremities       Interval Hx:   C/O post op neck pain. Pt desires to continue home methadone for pain control. Multimodal analgesics added to optimize pain control.     Intermittent mild bradycardia is again noted, however asymptomatic. BP is soft. Decrease Coreg to 12.5 mg bid . On RA.    Labs today - WBC 10, Hgb 12.4, Cr 0.7,        Case was discussed with patient's nurse and  on the floor.    Objective/physical exam:  General: In no acute distress, afebrile, C-collar in place   Chest: Clear to auscultation bilaterally  Heart: bradycardia, +S1, S2, no appreciable murmur  Abdomen: Soft, nontender, BS +  MSK: Warm, no lower extremity edema, no clubbing or cyanosis  Neurologic: Alert and oriented x4, Cranial nerve II-XII intact, Moves all ext spontaneously     VITAL SIGNS: 24 HRS MIN & MAX LAST   Temp  Min: 97.7 °F (36.5 °C)  Max: 98.3 °F (36.8 °C) 98 °F (36.7 °C)   BP  Min: 95/59  Max: 116/70 (!) 95/59   Pulse  Min: 58  Max: 78  72   Resp  Min: 16  Max: 18 17   SpO2  Min: 90 %  Max: 95 % 95 %     I have reviewed the following labs:  Recent Labs   Lab 05/14/24  0400   WBC 10.88   RBC 4.07*   HGB 12.4   HCT 37.2   MCV 91.4   MCH 30.5   MCHC 33.3   RDW 14.4      MPV 9.2     Recent Labs   Lab 05/14/24  0400      K 4.4   CO2 27   BUN 9.9   CREATININE 0.78   CALCIUM 8.3*     Microbiology Results (last 7 days)       ** No results found for the last 168 hours. **              See below for Radiology    Scheduled Med:   acetaminophen  650 mg Oral TID    atorvastatin  40 mg Oral QHS    carvediloL  25 mg Oral BID    ceFAZolin (Ancef) IV (PEDS and ADULTS)  1 g Intravenous Q8H    docusate sodium  100 mg Oral BID    gabapentin  100 mg Oral TID    hydrALAZINE  25 mg Oral TID    hydroCHLOROthiazide  12.5 mg Oral Daily    methadone  95 mg Oral Daily    methocarbamoL  500 mg Oral TID    mupirocin   Nasal BID    pantoprazole  40 mg Oral Daily    QUEtiapine  50 mg Oral Nightly    sertraline  100 mg Oral QHS      Continuous Infusions:     PRN Meds:    Current Facility-Administered Medications:     albuterol-ipratropium, 3 mL, Nebulization, Q4H PRN    aluminum-magnesium hydroxide-simethicone, 30 mL, Oral, Q4H PRN    methocarbamoL, 500 mg, Intravenous, Q8H PRN    morphine, 2 mg, Intravenous, Q4H PRN    ondansetron, 8 mg, Oral, Q6H PRN    prochlorperazine, 5 mg, Intravenous, Q6H PRN    senna-docusate 8.6-50 mg, 2 tablet, Oral, Nightly PRN    sumatriptan, 100 mg, Oral, Daily PRN     Assessment/Plan:  Cervical stenosis , s/plaminectomy - C3-4, C5-6 - on 5/13/24   Essential HTN  Mild bradycardia due to BB therapy   COPD without exacerbation   Chronic opioid use on methadone   Anxiety   Obesity , BMI 30.4    Plan-   Pain control with Home Methadone supplemented with multimodal amalgesics.   Mild bradycardia is noted . BP is relatively soft. Decrease Coreg to 12.5 mg bid   Post op management per Primary   DC plan per Primary   Home meds are reviewed and resumed       VTE prophylaxis: SCDS    Patient condition:  Fair     Anticipated discharge and Disposition:     DC plan per Primary     All diagnosis and differential diagnosis have been reviewed; assessment and plan has been documented; I have personally reviewed the labs and test results that are presently available; I have reviewed the patients medication list; I have reviewed the consulting providers response and recommendations. I have reviewed or  attempted to review medical records based upon their availability    All of the patient's questions have been  addressed and answered. Patient's is agreeable to the above stated plan. I will continue to monitor closely and make adjustments to medical management as needed.  _____________________________________________________________________    Nutrition Status:    Radiology:  I have personally reviewed the following imaging and agree with the radiologist.     SURG FL Surgery Fluoro Usage  See OP Notes for results.     IMPRESSION: See OP Notes for results.     This procedure was auto-finalized by: Virtual Radiologist      Familia Cunningham MD  Department of Hospital Medicine   [facility]   05/14/2024

## 2024-05-15 NOTE — PT/OT/SLP DISCHARGE
Physical Therapy Discharge Summary    Name: Brie Hidalgo  MRN: [id number]   Principal Problem: Stenosis of cervical spine with myelopathy     Patient Discharged from acute Physical Therapy on 5/15.  Please refer to prior PT noted date on 5/15 for functional status.     Assessment:     Patient has met all goals and is not appropriate for therapy.    Objective:     GOALS:   Multidisciplinary Problems       Physical Therapy Goals          Problem: Physical Therapy    Goal Priority Disciplines Outcome Goal Variances Interventions   Physical Therapy Goal     PT, PT/OT Progressing     Description: Goals to be met by: 24     Patient will increase functional independence with mobility by performin. Supine to sit with [place]  2. Sit to supine with [place]  3. Sit to stand transfer with Modified [place]  4. Gait  x 400 feet with Modified [place] using LRAD  5. Ascend/descend 3 stairs with left Handrails & Modified [place]                          Reasons for Discontinuation of Therapy Services  Satisfactory goal achievement.      Plan:     Patient Discharged to: Home no PT services needed.      5/15/2024

## 2024-05-16 VITALS
WEIGHT: 172 LBS | HEART RATE: 84 BPM | HEIGHT: 63 IN | DIASTOLIC BLOOD PRESSURE: 72 MMHG | SYSTOLIC BLOOD PRESSURE: 114 MMHG | BODY MASS INDEX: 30.48 KG/M2 | RESPIRATION RATE: 18 BRPM | OXYGEN SATURATION: 93 % | TEMPERATURE: 99 F

## 2024-05-16 PROCEDURE — 99024 POSTOP FOLLOW-UP VISIT: CPT | Mod: ,,, | Performed by: NEUROLOGICAL SURGERY

## 2024-05-16 PROCEDURE — S0109 METHADONE ORAL 5MG: HCPCS | Performed by: NEUROLOGICAL SURGERY

## 2024-05-16 PROCEDURE — 25000003 PHARM REV CODE 250: Performed by: INTERNAL MEDICINE

## 2024-05-16 PROCEDURE — 25000003 PHARM REV CODE 250: Performed by: NEUROLOGICAL SURGERY

## 2024-05-16 RX ORDER — GABAPENTIN 300 MG/1
300 CAPSULE ORAL 3 TIMES DAILY
Qty: 90 CAPSULE | Refills: 0 | Status: SHIPPED | OUTPATIENT
Start: 2024-05-16 | End: 2024-05-21 | Stop reason: SDUPTHER

## 2024-05-16 RX ORDER — METHOCARBAMOL 750 MG/1
750 TABLET, FILM COATED ORAL 3 TIMES DAILY PRN
Qty: 30 TABLET | Refills: 0 | Status: SHIPPED | OUTPATIENT
Start: 2024-05-16 | End: 2024-05-24 | Stop reason: SDUPTHER

## 2024-05-16 RX ORDER — CARVEDILOL 3.12 MG/1
3.12 TABLET ORAL 2 TIMES DAILY
Qty: 60 TABLET | Refills: 0 | Status: SHIPPED | OUTPATIENT
Start: 2024-05-16

## 2024-05-16 RX ORDER — METHADONE HYDROCHLORIDE 10 MG/ML
95 CONCENTRATE ORAL DAILY
Qty: 1 ML | Refills: 0 | Status: SHIPPED | OUTPATIENT
Start: 2024-05-17 | End: 2024-05-28

## 2024-05-16 RX ADMIN — CARVEDILOL 3.12 MG: 3.12 TABLET, FILM COATED ORAL at 09:05

## 2024-05-16 RX ADMIN — HYDRALAZINE HYDROCHLORIDE 25 MG: 25 TABLET ORAL at 09:05

## 2024-05-16 RX ADMIN — DOCUSATE SODIUM 100 MG: 100 CAPSULE, LIQUID FILLED ORAL at 09:05

## 2024-05-16 RX ADMIN — HYDROCHLOROTHIAZIDE 12.5 MG: 12.5 TABLET ORAL at 09:05

## 2024-05-16 RX ADMIN — GABAPENTIN 300 MG: 300 CAPSULE ORAL at 09:05

## 2024-05-16 RX ADMIN — METHOCARBAMOL 750 MG: 750 TABLET ORAL at 09:05

## 2024-05-16 RX ADMIN — ACETAMINOPHEN 650 MG: 325 TABLET, FILM COATED ORAL at 09:05

## 2024-05-16 RX ADMIN — METHADONE HYDROCHLORIDE 95 MG: 10 CONCENTRATE ORAL at 09:05

## 2024-05-16 RX ADMIN — PANTOPRAZOLE SODIUM 40 MG: 40 TABLET, DELAYED RELEASE ORAL at 09:05

## 2024-05-16 NOTE — NURSING
Discussed discharge instructions with patient and spouse regarding medications, DME, s/s infection, showering, and dressing changes.  Answered all questions.  Patient verbalized understanding.  I/V discontinued; VSS.  Patient's personal belongings were gathered by spouse. Discharge to home.

## 2024-05-16 NOTE — PROGRESS NOTES
[facility]  Hospital Medicine Progress Note        Chief Complaint: Inpatient Follow-up for medical management     HPI:   Per initial consult note  53 yr old female whose history includes HTN, COPD and cervical stenosis, opiate dependence on methadone, and anxiety. She is S/P cervical laminectomies C3-4 to C5-6 on 5/13/24 by Dr. Lilian Harding. Hospital medicine consulted for management of co-morbidities.         Interval Hx:   Patient seen and examined this morning  Reported neck pain improved with medication adjustments yesterday  No new complaints today, reported she is being planned to discharge home today  Discussed decreasing her beta blockers at discharge, she verbalized understanding     Pt HDS, bp latest 108/69, Hr in 70s      Objective/physical exam:  General: In no acute distress, afebrile   Chest: unlabored breathing   Heart: normal rate   Abdomen: Soft, nontender  MSK: Warm, no lower extremity edema  Neurologic: Alert and responding appropriately,  Moves all ext spontaneously     VITAL SIGNS: 24 HRS MIN & MAX LAST   Temp  Min: 97.6 °F (36.4 °C)  Max: 99.1 °F (37.3 °C) 98.1 °F (36.7 °C)   BP  Min: 90/57  Max: 108/69 108/69   Pulse  Min: 63  Max: 73  73   Resp  Min: 16  Max: 19 19   SpO2  Min: 92 %  Max: 97 % (!) 94 %     I have reviewed the following labs:  Recent Labs   Lab 05/14/24  0400   WBC 10.88   RBC 4.07*   HGB 12.4   HCT 37.2   MCV 91.4   MCH 30.5   MCHC 33.3   RDW 14.4      MPV 9.2     Recent Labs   Lab 05/14/24  0400      K 4.4   CO2 27   BUN 9.9   CREATININE 0.78   CALCIUM 8.3*     Microbiology Results (last 7 days)       ** No results found for the last 168 hours. **             See below for Radiology    Scheduled Med:   acetaminophen  650 mg Oral TID    atorvastatin  40 mg Oral QHS    carvediloL  3.125 mg Oral BID    docusate sodium  100 mg Oral BID    gabapentin  300 mg Oral TID    hydrALAZINE  25 mg Oral TID    hydroCHLOROthiazide  12.5 mg Oral  Daily    methadone  95 mg Oral Daily    methocarbamoL  750 mg Oral TID    pantoprazole  40 mg Oral Daily    QUEtiapine  50 mg Oral Nightly    sertraline  100 mg Oral QHS      Continuous Infusions:     PRN Meds:    Current Facility-Administered Medications:     albuterol-ipratropium, 3 mL, Nebulization, Q4H PRN    aluminum-magnesium hydroxide-simethicone, 30 mL, Oral, Q4H PRN    morphine, 2 mg, Intravenous, Q4H PRN    ondansetron, 8 mg, Oral, Q6H PRN    prochlorperazine, 5 mg, Intravenous, Q6H PRN    senna-docusate 8.6-50 mg, 2 tablet, Oral, Nightly PRN    sumatriptan, 100 mg, Oral, Daily PRN     Assessment/Plan:  Cervical stenosis , s/plaminectomy - C3-4, C5-6 - on 5/13/24   Mild bradycardia due to BB therapy   COPD without exacerbation   Chronic opioid use on methadone   Anxiety   Obesity , BMI 30.4    Plan-   Cont  gabapentin to 300 p.o. t.i.d. , Robaxin to 750 p.o. t.i.d. and continue with home dose of methadone  Bp and heart rate better on reduced dose, Coreg to 3.125 b.i.d. dc her home dose of 25 bid and continue 3.125, recommended to follow up with pcp for further adjustments to her meds she verbalized understanding   Post op management per Primary   DC plan per Primary         VTE prophylaxis: SCDS    Patient condition:  Fair     Anticipated discharge and Disposition:     DC plan per Primary       _____________________________________________________________________    Nutrition Status:    Radiology:  I have personally reviewed the following imaging and agree with the radiologist.     SURG FL Surgery Fluoro Usage  See OP Notes for results.     IMPRESSION: See OP Notes for results.     This procedure was auto-finalized by: Virtual Radiologist      Yamila Dykes MD  Department of Hospital Medicine   [facility]   05/14/2024

## 2024-05-16 NOTE — PROGRESS NOTES
Hospital Progress Note  [facility] Neurosurgery      Admit Date: 2024  Post-operative Day: 3 Days Post-Op  Hospital Day: 4    SUBJECTIVE:     POD #3 s/p C3-4 to C5-6 laminectomies     Interval History:  I evaluated Ms. Hidalgo with her  at the bedside, who is asleep.  The patient continues to have incisional and musculoskeletal pain in her neck, but no longer has heaviness in her bilateral arms or pain across her chest postoperatively.  Ms. Hidalgo ambulated in the hallways with a walker yesterday and considers her gait to be more balanced postoperatively.      Scheduled Meds:   acetaminophen  650 mg Oral TID    atorvastatin  40 mg Oral QHS    carvediloL  3.125 mg Oral BID    docusate sodium  100 mg Oral BID    gabapentin  300 mg Oral TID    hydrALAZINE  25 mg Oral TID    hydroCHLOROthiazide  12.5 mg Oral Daily    methadone  95 mg Oral Daily    methocarbamoL  750 mg Oral TID    pantoprazole  40 mg Oral Daily    QUEtiapine  50 mg Oral Nightly    sertraline  100 mg Oral QHS     Continuous Infusions:  PRN Meds:  Current Facility-Administered Medications:     albuterol-ipratropium, 3 mL, Nebulization, Q4H PRN    aluminum-magnesium hydroxide-simethicone, 30 mL, Oral, Q4H PRN    morphine, 2 mg, Intravenous, Q4H PRN    ondansetron, 8 mg, Oral, Q6H PRN    prochlorperazine, 5 mg, Intravenous, Q6H PRN    senna-docusate 8.6-50 mg, 2 tablet, Oral, Nightly PRN    sumatriptan, 100 mg, Oral, Daily PRN    Review of patient's allergies indicates:   Allergen Reactions    Codeine Rash       Past Medical History:   Diagnosis Date    Anxiety     Arm heaviness     COPD (chronic obstructive pulmonary disease)     Depression     Gait abnormality     GERD (gastroesophageal reflux disease)     HLD (hyperlipidemia)     Hypertension     Insomnia     Migraines     Obesity     Stenosis of cervical spine with myelopathy      Past Surgical History:   Procedure Laterality Date     SECTION      CHOLECYSTECTOMY       "COLONOSCOPY      DECOMPRESSION OF CERVICAL SPINE BY POSTERIOR APPROACH N/A 5/13/2024    Procedure: DECOMPRESSION, SPINE, CERVICAL, POSTERIOR APPROACH;  Surgeon: Lilian Harding MD;  Location: [facility];  Service: Neurosurgery;  Laterality: N/A;  C3-4, C4-5, C5-6 laminectomy // NTI // TIVA setup    ESOPHAGOGASTRODUODENOSCOPY      TONSILLECTOMY         OBJECTIVE:     Vital Signs (Most Recent)  Temp: 99.1 °F (37.3 °C) (05/16/24 0422)  Pulse: 70 (05/16/24 0422)  Resp: 16 (05/16/24 0422)  BP: (!) 96/59 (05/16/24 0422)  SpO2: (!) 92 % (05/16/24 0422)    Vital Signs Range (Last 24H):  Temp:  [97.6 °F (36.4 °C)-99.1 °F (37.3 °C)]   Pulse:  [63-73]   Resp:  [16-19]   BP: ()/(57-66)   SpO2:  [92 %-97 %]       Physical Exam:  General Conversant, no acute distress      Motor Strength is 5/5 x 4 extremities          Sensory Nl to light touch x 4 extremities      Wound Dressing- clean, dry, and intact       Laboratory Review:    CBC without Differential:  Lab Results   Component Value Date    WBC 10.88 05/14/2024    HGB 12.4 05/14/2024    HCT 37.2 05/14/2024     05/14/2024    MCV 91.4 05/14/2024     Differential:   No results found for: "NEUTROABS", "LYMPHSABS", "BASOSABS", "MONOSABS"    Basic Metabolic Panel:  Lab Results   Component Value Date     05/14/2024    K 4.4 05/14/2024    BUN 9.9 05/14/2024     Coagulation Studies:  Lab Results   Component Value Date    INR 1.0 04/27/2024    INR 0.9 10/10/2017    PROTIME 12.9 04/27/2024    PROTIME 10.0 10/10/2017     Lab Results   Component Value Date    PTT 25.6 04/28/2024     Urinalysis:  Lab Results   Component Value Date    LEUKOCYTESUR 75 (A) 04/28/2024    UROBILINOGEN Normal 04/28/2024        ASSESSMENT/PLAN:     Ms. Hidalgo is a 53 y.o.female who has a past medical history significant for hypertension, COPD, opiate dependence on methadone, and anxiety.  She is POD #3 s/p C3-4 to C5-6 laminectomies for cervical stenosis with myelopathy.  Postoperatively, Ms. " Rocky is doing very well with a normal motor and sensory neurological exam.  However, she is having moderate incisional neck pain with her current medication regimen.          Plan:      1.  The patient has been admitted to a floor bed on the neurosurgery service with Q4 hr neuro checks.     2.  Recommendations from the hospitalist consult service are appreciated.      3.  The patient brought her home supply of methadone, which will be issued by the nurses on a daily basis per her home dose.    4.  Daily dressing changes with 4x4s and Medipore tape been ordered.     5.  Ms. Hidalgo is encouraged to continue mobilizing with physical therapy and occupational therapy.     6.  Case management/ social work is making recommendations regarding he discharge plan, which will most likely involve arranging home health with physical therapy.     7.  Ms. Hidalgo will be discharged to home today once a discharge plan has been finalized.     8.  Arrangements have been made for Ms. Hidalgo to follow up in the neurosurgery clinic with LINDA Yarbrough on Tuesday, 05/28/2024 at 3:00 p.m.         Lilian Harding MD  Neurosurgery

## 2024-05-16 NOTE — DISCHARGE SUMMARY
[facility]  Discharge Summary  Neurosurgery    Admit Date: 5/13/2024    Discharge Date and Time: 5/16/2024  1:19 PM    Attending Physician: Lilian Harding MD    Discharge Physician: Lilian Harding MD    Reason for Admission:   Ms. Hidalgo is a 53 y.o.female who has a past medical history significant for hypertension, COPD, opiate dependence on methadone, and anxiety.  For the last 2-3 months, the patient has been experiencing heaviness in her bilateral arms, pain across her chest, difficulty walking, and falling episodes.  Ms. Hidalgo had a normal motor and sensory neurological exam with myelopathy.  A MRI cervical spine without gadolinium demonstrated normal alignment.  There was severe stenosis with bilateral neuroforaminal narrowing at C3-4 and C4-5.  There was a left-sided disc bulge at C5-6 with left neuroforaminal narrowing.  Within the spinal cord at the C3-4 level, there was increased T2 signal, which was consistent with myelomalacia.     I discussed with Ms. Hidalgo and her family that she had severe multilevel cervical stenosis that was causing her to have neurological symptoms.  Should the patient be involved in a car accident or fall, her risk of permanent paralysis would be much higher than average due to the small amount of reserve that she had in her cervical spinal canal.  Therefore, I recommended surgery, specifically a C3-4 to C5-6 laminectomy with intraoperative neuro monitoring.  I reviewed the risks, benefits, and alternatives of this surgery.  Ms. Hidalgo agreed to proceed.        Procedures Performed:   C3-4 to C5-6 laminectomies, 5/13/2024- Dr. NEELAM Harding    Hospital Course:   On 5/13/2024, Ms. Hidalgo underwent C3-4 to C5-6 laminectomy surgery by Dr. NEELAM Harding.  Postoperatively, the patient was extubated and taken to recovery in stable condition.  After observation in recovery, the patient was transferred to the neurosurgical floor.     Postoperatively, Ms. Hidalgo had resolution of her  chest pain and sensation of bilateral arm heaviness.  She had incisional pain in the posterior neck region with a normal motor and sensory neurological exam.    The patient was co managed by the hospitalist service.  She remained on her home dose of methadone throughout her hospitalization.     The patient mobilized with physical therapy and occupational therapy.  She was discharged to home on POD #3 on 5/16/2024 with a walker.  At time of discharge, Ms. Hidalgo was neurologically stable, was afebrile, and vital signs were stable.  The patient was tolerating a diet and voiding without difficulty.     Discharge instructions were verbally discussed with the patient, including wound care and follow up appointments.  The patient was also given a discharge instruction sheet explaining the aforementioned discussion.  The patient verbalized understanding of instructions and agreed to the plan.      Consults: Hospitalist    Final Diagnoses: cervical stenosis with myelopathy    Discharged Condition: good   Principal Problem: Stenosis of cervical spine with myelopathy   Secondary Diagnoses:  hypertension, COPD, opiate dependence on methadone, and anxiety    Disposition: Home or Self Care    Diet: Regular as tolerated     Activity: See instructions    Follow Up/Patient Instructions:   Arrangements have been made for Ms. Hidalgo to follow up in the neurosurgery clinic with LINDA Yarbrough on Tuesday, 05/28/2024 at 3:00 p.m.     See patient instruction sheet for further details.    Medications:  Reconciled Home Medications:      Medication List        START taking these medications      gabapentin 300 MG capsule  Commonly known as: NEURONTIN  Take 1 capsule (300 mg total) by mouth 3 (three) times daily.     methocarbamoL 750 MG Tab  Commonly known as: ROBAXIN  Take 1 tablet (750 mg total) by mouth 3 (three) times daily as needed (muscle spasms).            CHANGE how you take these medications      carvediloL 3.125 MG tablet  Commonly  known as: COREG  Take 1 tablet (3.125 mg total) by mouth 2 (two) times daily.  What changed:   medication strength  how much to take     methadone 10 mg/mL solution  Commonly known as: DOLOPHINE  Take 9.5 mLs (95 mg total) by mouth once daily. for 1 day  Start taking on: May 17, 2024  What changed: medication strength            CONTINUE taking these medications      albuterol 90 mcg/actuation inhaler  Commonly known as: PROVENTIL/VENTOLIN HFA  Inhale 2 puffs into the lungs every 4 (four) hours as needed for Shortness of Breath or Wheezing.     atorvastatin 40 MG tablet  Commonly known as: LIPITOR  Take 40 mg by mouth every evening.     cyclobenzaprine 10 MG tablet  Commonly known as: FLEXERIL  Take 10 mg by mouth 3 (three) times daily as needed for Muscle spasms.     ergocalciferol 50,000 unit Cap  Commonly known as: ERGOCALCIFEROL  Take 50,000 Units by mouth 3 (three) times a week.     fluconazole 10 mg/mL suspension  Commonly known as: DIFLUCAN  Take 10 mLs by mouth 3 (three) times daily.     hydrALAZINE 25 MG tablet  Commonly known as: APRESOLINE  Take 25 mg by mouth 3 (three) times daily.     hydroCHLOROthiazide 12.5 mg capsule  Commonly known as: MICROZIDE  Take 12.5 mg by mouth every morning.     hydrOXYzine pamoate 25 MG Cap  Commonly known as: VISTARIL  Take 25 mg by mouth 2 (two) times daily.     mirtazapine 30 MG tablet  Commonly known as: REMERON  30 mg every evening.     omeprazole 40 MG capsule  Commonly known as: PRILOSEC  Take 40 mg by mouth every morning.     ondansetron 8 MG tablet  Commonly known as: ZOFRAN  Take 1 tablet (8 mg total) by mouth every 8 (eight) hours as needed for Nausea.     QUEtiapine 50 MG tablet  Commonly known as: SEROQUEL  Take 50 mg by mouth nightly.     sertraline 100 MG tablet  Commonly known as: ZOLOFT  Take 100 mg by mouth every evening.     sumatriptan 100 MG tablet  Commonly known as: IMITREX  Take 100 mg by mouth daily as needed.            STOP taking these  "medications      ibuprofen 800 MG tablet  Commonly known as: ADVIL,MOTRIN     nicotine 14 mg/24 hr  Commonly known as: NICODERM CQ            Discharge Procedure Orders   WALKER FOR HOME USE   Order Comments: ICD-10-CM: M48.02, G99.2     Order Specific Question Answer Comments   Type of Walker: Adult (5'4"-6'6")    With wheels? Yes    Height: 5' 2.99" (1.6 m)    Weight: 78 kg (172 lb)    Length of need (1-99 months): 99 Months   Does patient have medical equipment at home? none Pt has access to rollatory and shower chair   Please check all that apply: Patient's condition impairs ambulation.    Please check all that apply: Patient is unable to safely ambulate without equipment.    Please check all that apply: Patient needs help to get in and out of chair.       Contact information for follow-up providers       Lilian Harding MD. Go on 5/28/2024.    Specialty: Neurosurgery  Why: Appt time @3pm with LINDA Yarbrough  Contact information:  [address] [place] [address] [address]  [facility] [address]  [phone]                       Contact information for after-discharge care       Durable Medical Equipment       [facility]    Service: Durable Medical Equipment  Contact information:  [address] [place] [address] [address]  [facility] [address]  [phone]                                 POSTERIOR CERVICAL FUSION  DISCHARGE INSTRUCTIONS      1.  Wear your cervical collar at all times.  Use the [place] J collar (blue color).  The lining of the collar can be removed and washed with soap and water.   When showering, use the [place] collar (peach color).  Your collar will likely be discontinued after 8-12 weeks.      2.  Keep your incision clean and dry.    Your sutures will be removed at your first postoperative follow-up appointment in 10-14 days.   Place clean gauze and tape over your wound daily until your sutures are removed.  Wait at least 72 hours from the time of your surgery to take a shower.  After showering, " pat your incision dry and replace the wound dressing.  Do not immerse your incision in water for 4-6 weeks (e.g. bath tub, hot tub, swimming pool).      3.  Activity restrictions:  No lifting greater than 15 pounds until your return appointment in 4-6 weeks.  No bending, stooping, or twisting.  No impact exercise or contact sports for at least 3 months.  No driving until your cervical collar is removed in 8-12 weeks.  To resume driving short distances (<30 minutes), you must be off of your narcotic medications and be able to comfortably brake suddenly, should the need arise.    Get up and walk.      4.  Contact your Neurosurgeon if the following occurs:  Signs and symptoms of infection, including fever above 101.5 degrees Fahrenheit and/or chills.  Redness, swelling, warmth, or drainage from the incision.  Any lasting changes in sensation, numbness, and/or tingling.  Increased weakness or increased pain.  Swelling of the foot and/or lower leg with calf pain.      Neurosurgery has office hours Monday through Friday, 8:00 AM to 4:00 PM except for holidays. There is an answering service available during non-office hours, with 24 hours neurosurgery coverage.  Report to the Emergency Department if you need immediate medical assistance.      Because you have had a fusion, you are not to take steroidal medications or non-steroidal anti-inflammatory (NSAID) medications such as Motrin/ Ibuprofen or Naprosyn/ Naproxen unless agreed upon with your neurosurgeon.      Please contact Dr. Harding's office for any questions or concerns.  Typically, your first follow-up appointment after a posterior cervical fusion surgery is 10-14 days from the date of your operation.  At this time, sutures will be removed.  For your second postoperative appointment in 6-8 weeks, an x-ray of your cervical spine will be arranged in Radiology before your neurosurgery appointment.  You may be requested to remove your cervical collar for these flexion/  extension C-spine x-rays, and then replace the brace after your x-rays are completed.

## 2024-05-16 NOTE — PLAN OF CARE
05/16/24 1021   Final Note   Assessment Type Final Discharge Note   Anticipated Discharge Disposition Home   Hospital Resources/Appts/Education Provided Post-Acute resouces added to AVS   Post-Acute Status   Post-Acute Authorization HME   HME Status Set-up Complete/Auth obtained   Discharge Delays None known at this time     Pt to d/c home. RW delivered to bedside yesterday. No further SW needs known at this time.    Peggy Slaughter LCSW

## 2024-05-21 ENCOUNTER — TELEPHONE (OUTPATIENT)
Dept: NEUROSURGERY | Facility: CLINIC | Age: 54
End: 2024-05-21
Payer: MEDICAID

## 2024-05-21 DIAGNOSIS — G99.2 STENOSIS OF CERVICAL SPINE WITH MYELOPATHY: Primary | ICD-10-CM

## 2024-05-21 DIAGNOSIS — M48.02 STENOSIS OF CERVICAL SPINE WITH MYELOPATHY: Primary | ICD-10-CM

## 2024-05-21 RX ORDER — GABAPENTIN 300 MG/1
300 CAPSULE ORAL 3 TIMES DAILY
Qty: 90 CAPSULE | Refills: 0 | Status: SHIPPED | OUTPATIENT
Start: 2024-05-21 | End: 2024-06-20

## 2024-05-21 NOTE — TELEPHONE ENCOUNTER
Patient called crying. She is s/p C3-4 to C5-6 laminectomies by Dr. Harding on 5/13/24. Prior to surgery, she c/o had been experiencing heaviness in her bilateral arms, pain across her chest, difficulty walking, and falling episodes. Postoperatively, she had resolution of her chest pain and sensation of bilateral arm heaviness. She did have incisional pain. She was discharged home on 5/16/2024 with Methadone (Dr. Harding gave rx at [facility], but med is usually given by Dr. Ann Dempsey), gabapentin 300 tid and Roaxin 750 mg tid. She reports the bilateral arm heaviness returned yesterday along with the incisional pain. She c/o redness and puffiness to incision. Denies drainage, fever, chills or body aches. Meds are not touching the pain. Pictures of incision are attached. I checked the pharmacy website, no other meds from any other doctors. The methadone does not show up, I'm guessing because she goes to a methadone clinic. Anything to do?

## 2024-05-21 NOTE — TELEPHONE ENCOUNTER
Her incision does not appear infected in my opinion, it looks like she may have some irritation around the area secondary to her dressing and adhesives.  I would recommend she attempt increasing her gabapentin slowly over the next several days to 600 mg 3 times daily.  I would also recommend she reach out to the provider that typically prescribes her methadone regarding additional medications as this is not something we typically manage. Thanks

## 2024-05-24 ENCOUNTER — TELEPHONE (OUTPATIENT)
Dept: NEUROSURGERY | Facility: CLINIC | Age: 54
End: 2024-05-24
Payer: MEDICAID

## 2024-05-24 DIAGNOSIS — M48.02 STENOSIS OF CERVICAL SPINE WITH MYELOPATHY: Primary | ICD-10-CM

## 2024-05-24 DIAGNOSIS — G99.2 STENOSIS OF CERVICAL SPINE WITH MYELOPATHY: Primary | ICD-10-CM

## 2024-05-24 DIAGNOSIS — G89.18 ACUTE POSTOPERATIVE PAIN: ICD-10-CM

## 2024-05-24 RX ORDER — METHOCARBAMOL 750 MG/1
750-1500 TABLET, FILM COATED ORAL 3 TIMES DAILY PRN
Qty: 60 TABLET | Refills: 1 | Status: SHIPPED | OUTPATIENT
Start: 2024-05-24

## 2024-05-24 NOTE — TELEPHONE ENCOUNTER
I am requesting Sydni to contact Ms. Hidalgo.  The patient is a chronic pain patient who has been on methadone for 18 years.  During her inpatient hospitalization, she was not on any additional PO pain medications, but was prescribed Robaxin for muscle spasms.    I agree with a prescription for Robaxin and have signed this order.  Ms. Hidalgo is recommended to follow up with the provider who prescribes her methadone for any additional recommendations.

## 2024-05-24 NOTE — TELEPHONE ENCOUNTER
I received the following email from the patient yesterday afternoon: This is bakari I'm burning and hurting and on my right side its swollen what should I do?    I spoke with the patient this morning.  She is s/p C3-4 to C5-6 laminectomies on 5/13/24.  She complains burning pain in the right greater than left neck and shoulder blades.  She has not had much relief with Tylenol or Robaxin.  She has taken Flexeril in the past, but did not find it helped much either.  She is currently out of the Robaxin.  She asked if she could have a stronger dose or something for pain.  I recommended she try applying ice packs to the area for about 15 minutes a few times a day.  I told her she could try taking two of the Robaxin at a time and sent in a refill to [facility] [place]  I told her she would likely need to talk to the physician that prescribes the methadone regarding pain medications.  She said that she has been on methadone for 18yrs and has taken Lortab in the past without problems.  I asked what they were giving her for pain while in the hospital.  She thought it was Lortab.  I checked the MAR and only see one dose of Norco 5-325mg given on 5/14.  Aside from this it looks like she was taking Tylenol and Robaxin.  She asked if we could send something in for pain.  I told her I would check with you and see if there were any other recommendations and call her back.  I checked , nothing aside from a prescription for Norco 10-325mg # 18 on 1/26 by Dr. Sicard in [place].  Do you want to send anything in for her? Anything else to recommend?

## 2024-05-28 ENCOUNTER — OFFICE VISIT (OUTPATIENT)
Dept: NEUROSURGERY | Facility: CLINIC | Age: 54
End: 2024-05-28
Payer: MEDICAID

## 2024-05-28 VITALS
WEIGHT: 183 LBS | SYSTOLIC BLOOD PRESSURE: 122 MMHG | HEIGHT: 62 IN | DIASTOLIC BLOOD PRESSURE: 84 MMHG | RESPIRATION RATE: 16 BRPM | BODY MASS INDEX: 33.68 KG/M2 | HEART RATE: 87 BPM | TEMPERATURE: 98 F

## 2024-05-28 DIAGNOSIS — M48.02 STENOSIS OF CERVICAL SPINE WITH MYELOPATHY: Primary | ICD-10-CM

## 2024-05-28 DIAGNOSIS — G99.2 STENOSIS OF CERVICAL SPINE WITH MYELOPATHY: Primary | ICD-10-CM

## 2024-05-28 PROCEDURE — 3079F DIAST BP 80-89 MM HG: CPT | Mod: CPTII,,, | Performed by: PHYSICIAN ASSISTANT

## 2024-05-28 PROCEDURE — 1160F RVW MEDS BY RX/DR IN RCRD: CPT | Mod: CPTII,,, | Performed by: PHYSICIAN ASSISTANT

## 2024-05-28 PROCEDURE — 3074F SYST BP LT 130 MM HG: CPT | Mod: CPTII,,, | Performed by: PHYSICIAN ASSISTANT

## 2024-05-28 PROCEDURE — 99024 POSTOP FOLLOW-UP VISIT: CPT | Mod: ,,, | Performed by: PHYSICIAN ASSISTANT

## 2024-05-28 PROCEDURE — 1159F MED LIST DOCD IN RCRD: CPT | Mod: CPTII,,, | Performed by: PHYSICIAN ASSISTANT

## 2024-05-28 RX ORDER — PROMETHAZINE HYDROCHLORIDE 25 MG/1
25 TABLET ORAL 3 TIMES DAILY PRN
COMMUNITY

## 2024-05-28 RX ORDER — METHYLPREDNISOLONE 4 MG/1
TABLET ORAL
Qty: 21 EACH | Refills: 0 | Status: SHIPPED | OUTPATIENT
Start: 2024-05-28 | End: 2024-06-18

## 2024-05-28 NOTE — PROGRESS NOTES
One me he had it is global warming everything bad History & Physical  Neurosurgery      Brie Hidalgo   [id number]   1970       SUBJECTIVE:     CHIEF COMPLAINT:  Neck and trapezial pain      HPI:  Brie Hidalgo is a 53 y.o. female who presents for a 2 week postoperative follow up appointment.  On 2024, the patient underwent C3-4, C4-5, and C5-6 decompressive laminectomies with Dr. Harding.  She was able to be discharged to home on 2024.  The patient complains of neck pain with pain into bilateral trapezius muscles which is incisional pain.  He rates his pain at 8/10.  She was seen resolution of her preoperative upper extremity symptoms.  Her balance and walking has improved as well.      Past Medical History:   Diagnosis Date    Anxiety     Arm heaviness     COPD (chronic obstructive pulmonary disease)     Depression     Gait abnormality     GERD (gastroesophageal reflux disease)     HLD (hyperlipidemia)     Hypertension     Insomnia     Migraines     Obesity     Stenosis of cervical spine with myelopathy        Past Surgical History:   Procedure Laterality Date     SECTION      CHOLECYSTECTOMY      COLONOSCOPY      DECOMPRESSION OF CERVICAL SPINE BY POSTERIOR APPROACH N/A 2024    C3-4, C4-5, C5-6.  Dr. Harding    ESOPHAGOGASTRODUODENOSCOPY      TONSILLECTOMY         Family History   Problem Relation Name Age of Onset    Heart disease Mother      Cancer Father      Heart disease Father         Social History     Socioeconomic History    Marital status:    Tobacco Use    Smoking status: Former     Current packs/day: 0.00     Types: Cigarettes     Quit date: 2024     Years since quittin.0    Smokeless tobacco: Never   Substance and Sexual Activity    Alcohol use: Never    Drug use: Never     Social Determinants of Health     Financial Resource Strain: Low Risk  (2024)    Overall Financial Resource Strain (CARDIA)     Difficulty of Paying Living Expenses: Not hard at all  "  Food Insecurity: No Food Insecurity (5/14/2024)    Hunger Vital Sign     Worried About Running Out of Food in the Last Year: Never true     Ran Out of Food in the Last Year: Never true   Transportation Needs: No Transportation Needs (5/14/2024)    TRANSPORTATION NEEDS     Transportation : No   Stress: No Stress Concern Present (5/14/2024)    [employer] - Occupational Stress Questionnaire     Feeling of Stress : Not at all   Housing Stability: Low Risk  (5/14/2024)    Housing Stability Vital Sign     Unable to Pay for Housing in the Last Year: No     Homeless in the Last Year: No       Review of patient's allergies indicates:   Allergen Reactions    Lisinopril Other (See Comments)    Codeine Rash        Current Outpatient Medications   Medication Instructions    albuterol (PROVENTIL/VENTOLIN HFA) 90 mcg/actuation inhaler 2 puffs, Inhalation, Every 4 hours PRN    atorvastatin (LIPITOR) 40 mg, Oral, Nightly    carvediloL (COREG) 3.125 mg, Oral, 2 times daily    ergocalciferol (ERGOCALCIFEROL) 50,000 Units, Oral, Three times weekly    gabapentin (NEURONTIN) 300 mg, Oral, 3 times daily    hydrALAZINE (APRESOLINE) 25 mg, Oral, 3 times daily    hydroCHLOROthiazide (MICROZIDE) 12.5 mg, Oral, Every morning    hydrOXYzine pamoate (VISTARIL) 25 mg, Oral, 2 times daily    methadone (DOLOPHINE) 95 mg, Oral, Daily    methocarbamoL (ROBAXIN) 750-1,500 mg, Oral, 3 times daily PRN    omeprazole (PRILOSEC) 40 mg, Oral, Every morning    promethazine (PHENERGAN) 25 mg, Oral, 3 times daily PRN    sertraline (ZOLOFT) 100 mg, Oral, Nightly        Review of Systems   12 point review of systems conducted, negative except as stated in the history of present illness. See HPI for details.      OBJECTIVE:       Visit Vitals  /84 (BP Location: Left arm, Patient Position: Sitting)   Pulse 87   Temp 98.2 °F (36.8 °C) (Oral)   Resp 16   Ht 5' 2" (1.575 m)   Wt 83 kg (183 lb)   BMI 33.47 kg/m²      "   General:  Pleasant, Well-nourished, Well-groomed.    Lungs:  Breathing is quiet with non-labored respirations.    Musculoskeletal:  Incision is well healed.  Sutures were removed.    Neurological:    Alert and oriented to person, place, time, and situation.  Her memory, cognition, and affect are appropriate.  Speech is fluent.  She was moving all extremities relatively well.  He was using a rolling walker.        ASSESSMENT:     1. Stenosis of cervical spine with myelopathy  methylPREDNISolone (MEDROL DOSEPACK) 4 mg tablet    Back/Cervical Brace For Home Use        PLAN:     The patient was seen improvement in her symptoms with surgery.  She is complaining of significant incisional pain.  She is using methocarbamol.  She was also on methadone.  Due to the spasms and pain, she was provided with a prescription for Medrol Dosepak.  She was also provided with a prescription for soft cervical collar to be worn for comfort.  She will return for follow-up at 6 weeks postop.      Linnea Howell PA-C      Disclaimer:  This note is prepared using voice recognition software and as such is likely to have errors despite attempts at proofreading.

## 2024-06-24 ENCOUNTER — OFFICE VISIT (OUTPATIENT)
Dept: NEUROSURGERY | Facility: CLINIC | Age: 54
End: 2024-06-24
Payer: MEDICAID

## 2024-06-24 VITALS
HEART RATE: 74 BPM | RESPIRATION RATE: 16 BRPM | SYSTOLIC BLOOD PRESSURE: 153 MMHG | WEIGHT: 174.19 LBS | HEIGHT: 62 IN | DIASTOLIC BLOOD PRESSURE: 79 MMHG | BODY MASS INDEX: 32.05 KG/M2

## 2024-06-24 DIAGNOSIS — M48.02 STENOSIS OF CERVICAL SPINE WITH MYELOPATHY: Primary | ICD-10-CM

## 2024-06-24 DIAGNOSIS — G99.2 STENOSIS OF CERVICAL SPINE WITH MYELOPATHY: Primary | ICD-10-CM

## 2024-06-24 PROCEDURE — 99024 POSTOP FOLLOW-UP VISIT: CPT | Mod: ,,, | Performed by: PHYSICIAN ASSISTANT

## 2024-06-24 PROCEDURE — 1160F RVW MEDS BY RX/DR IN RCRD: CPT | Mod: CPTII,,, | Performed by: PHYSICIAN ASSISTANT

## 2024-06-24 PROCEDURE — 99406 BEHAV CHNG SMOKING 3-10 MIN: CPT | Mod: ,,, | Performed by: PHYSICIAN ASSISTANT

## 2024-06-24 PROCEDURE — 3078F DIAST BP <80 MM HG: CPT | Mod: CPTII,,, | Performed by: PHYSICIAN ASSISTANT

## 2024-06-24 PROCEDURE — 3077F SYST BP >= 140 MM HG: CPT | Mod: CPTII,,, | Performed by: PHYSICIAN ASSISTANT

## 2024-06-24 PROCEDURE — 1159F MED LIST DOCD IN RCRD: CPT | Mod: CPTII,,, | Performed by: PHYSICIAN ASSISTANT

## 2024-06-24 RX ORDER — TIZANIDINE 4 MG/1
4 TABLET ORAL EVERY 8 HOURS PRN
Qty: 30 TABLET | Refills: 2 | Status: SHIPPED | OUTPATIENT
Start: 2024-06-24 | End: 2024-07-04

## 2024-06-24 NOTE — PROGRESS NOTES
[facility]  History & Physical  Neurosurgery      Brie Hidalgo   [id number]   1970       SUBJECTIVE:     CHIEF COMPLAINT:  Neck pain      HPI:  Brie Hidalgo is a 53 y.o. female who presents for six week postoperative follow up appointment.  On 2024, the patient underwent C3-4, C4-5, and C5-6 decompressive laminectomies with Dr. Harding.  She was last seen on 2024.  She has a history of chronic pain and is on methadone.  Her incisional pain was quite severe at that time.  She returns today for follow-up.    She continues with posterior neck pain and pain into the right-greater-than-left trapezius muscle.  There are significant spasms in the trapezius and paraspinal muscles.  She reports her arms feel great.  There remains resolution of her preoperative symptoms.      Past Medical History:   Diagnosis Date    Anxiety     Arm heaviness     COPD (chronic obstructive pulmonary disease)     Depression     Gait abnormality     GERD (gastroesophageal reflux disease)     HLD (hyperlipidemia)     Hypertension     Insomnia     Migraines     Obesity     Stenosis of cervical spine with myelopathy        Past Surgical History:   Procedure Laterality Date     SECTION      CHOLECYSTECTOMY      COLONOSCOPY      DECOMPRESSION OF CERVICAL SPINE BY POSTERIOR APPROACH N/A 2024    C3-4, C4-5, C5-6.  Dr. Harding    ESOPHAGOGASTRODUODENOSCOPY      TONSILLECTOMY         Family History   Problem Relation Name Age of Onset    Heart disease Mother      Cancer Father      Heart disease Father         Social History     Socioeconomic History    Marital status:    Tobacco Use    Smoking status: Former     Current packs/day: 0.00     Types: Cigarettes     Quit date: 2024     Years since quittin.1    Smokeless tobacco: Never   Substance and Sexual Activity    Alcohol use: Never    Drug use: Never     Social Determinants of Health     Financial Resource Strain: Low Risk  (2024)     "Overall Financial Resource Strain (CARDIA)     Difficulty of Paying Living Expenses: Not hard at all   Food Insecurity: No Food Insecurity (5/14/2024)    Hunger Vital Sign     Worried About Running Out of Food in the Last Year: Never true     Ran Out of Food in the Last Year: Never true   Transportation Needs: No Transportation Needs (5/14/2024)    TRANSPORTATION NEEDS     Transportation : No   Stress: No Stress Concern Present (5/14/2024)    [employer] - Occupational Stress Questionnaire     Feeling of Stress : Not at all   Housing Stability: Low Risk  (5/14/2024)    Housing Stability Vital Sign     Unable to Pay for Housing in the Last Year: No     Homeless in the Last Year: No       Review of patient's allergies indicates:   Allergen Reactions    Lisinopril Other (See Comments)    Codeine Rash        Current Outpatient Medications   Medication Instructions    albuterol (PROVENTIL/VENTOLIN HFA) 90 mcg/actuation inhaler 2 puffs, Inhalation, Every 4 hours PRN    atorvastatin (LIPITOR) 40 mg, Oral, Nightly    carvediloL (COREG) 3.125 mg, Oral, 2 times daily    ergocalciferol (ERGOCALCIFEROL) 50,000 Units, Oral, Three times weekly    gabapentin (NEURONTIN) 300 mg, Oral, 3 times daily    hydrALAZINE (APRESOLINE) 25 mg, Oral, 3 times daily    hydroCHLOROthiazide (MICROZIDE) 12.5 mg, Oral, Every morning    hydrOXYzine pamoate (VISTARIL) 25 mg, Oral, 2 times daily    methadone (DOLOPHINE) 95 mg, Oral, Daily    omeprazole (PRILOSEC) 40 mg, Oral, Every morning    promethazine (PHENERGAN) 25 mg, 3 times daily PRN    sertraline (ZOLOFT) 100 mg, Oral, Nightly        Review of Systems   12 point review of systems conducted, negative except as stated in the history of present illness. See HPI for details.      OBJECTIVE:       Visit Vitals  BP (!) 153/79   Pulse 74   Resp 16   Ht 5' 2" (1.575 m)   Wt 79 kg (174 lb 3.2 oz)   BMI 31.86 kg/m²        General:  Pleasant, Well-nourished, " Well-groomed.    Lungs:  Breathing is quiet with non-labored respirations.    Musculoskeletal:  Cervical ROM:  Moderately limited.  Spasms are severe.  There is tenderness to palpation at bilateral trapezius and paraspinal muscles.  Incision is well healed.  There is a suture remaining below the skin at the inferior edge.  There is no redness or drainage at that area.  I attempted to retrieve the suture but was unsuccessful.    Neurological:    Alert and oriented to person, place, time, and situation.  Her memory, cognition, and affect are appropriate.  Speech is fluent.  Moving all extremities well.      ASSESSMENT:     1. Stenosis of cervical spine with myelopathy  tiZANidine (ZANAFLEX) 4 MG tablet    Ambulatory referral/consult to Physical/Occupational Therapy        PLAN:     Options were discussed with the patient and her significant other.  She was provided with a prescription for physical therapy which I think will help with the spasms and pain she was experiencing.  We discussed cervical range of motion exercises.  She was provided with a prescription for Zanaflex.  The Robaxin did not help her much.  She will return for follow-up on an as-needed basis.    In addition, we discussed the importance of tobacco cessation.      Linnae Howell PA-C      Disclaimer:  This note is prepared using voice recognition software and as such is likely to have errors despite attempts at proofreading.

## 2024-07-23 ENCOUNTER — TELEPHONE (OUTPATIENT)
Dept: NEUROSURGERY | Facility: CLINIC | Age: 54
End: 2024-07-23
Payer: MEDICAID

## 2024-07-23 DIAGNOSIS — M47.12 CERVICAL SPONDYLOSIS WITH MYELOPATHY: Primary | ICD-10-CM

## 2024-07-23 NOTE — TELEPHONE ENCOUNTER
Spoke with patient and scheduled her to see JK on 8/1 at 830am. Patient stated that she is currently on her way to get her Xr. She is very concerned and worried so I explained to the patient that we will keep an eye for the results. Patient expressed understanding and was thankful for our help.

## 2024-07-23 NOTE — TELEPHONE ENCOUNTER
----- Message from Sherin Mayberry MA sent at 7/23/2024 11:08 AM CDT -----  Regarding: Patient Called - Needs Appt?  Patient called - C3-4, C4-5, and C5-6 decompressive laminectomies with Dr. Harding 5/13/2024. Last seen on 6/24/24 by BLANCA - PRN w/order for PT - patient is currently attending PT      Patient stated that she has been having a very loud popping sounds in her neck with a lot of tightness and is feeling like she is starting to walk funny again. Patient stated that the popping is so loud it makes her nauseas and feels like she is going to pass out - getting worse since sx - burning type pain does not go down any extremities rates 7/10 - unknown cause - keeps her up at night.     Please advise as to how to proceed.        Best  # [phone]

## 2024-07-26 NOTE — PROGRESS NOTES
[facility]  History & Physical  Neurosurgery      Brie Hidalgo   [id number]   1970     SUBJECTIVE:     CHIEF COMPLAINT:  3 month Post-op visit    HPI:  Brie Hidalgo is a 53 y.o. female who presents for a 3 month post-operative follow-up.  She is s/p C3-4, C4-5 and C5-6 decompressive laminectomies by Dr. Harding on 5/13/2024.     Prior to surgical intervention the patient reported 2-3 months of heaviness into the bilateral upper extremities with pain across the chest and difficulty walking.  She was having frequent falls at that time.  She was last seen in clinic by LINDA Bledsoe on 6/24/2024 for a 6 week postoperative visit at which time she continued to struggle with posterior neck pain as well as right-greater-than-left trapezius and paraspinal spasming.  She reported near resolution of her previous bilateral upper extremity symptoms.  She continued in pain management for chronic pain which was being treated with methadone.    The patient returns today reporting symptoms to when she was last seen.  She continues to describe severe posterior neck spasming radiating into the bilateral trapezius region.  She reports she had a pretty rough night last night secondary to this pain.  She reports she will occasionally have a loud popping from her neck that can be very painful as well.  She continues on with physical therapy twice weekly with massage, heat and TENs.  She reports she continues on with some exercises and stretches at home as well on the days she does not participate in therapy.  She is denying any upper extremity radicular symptoms.  She was rating her pain an 8/10 today.  The patient denies disturbances in bowel or bladder function.  There is no difficulty with balance. She denies any fevers. She denies redness, warmth, drainage, swelling or tenderness to the surgical site.      Past Medical History:   Diagnosis Date    Anxiety     Arm heaviness     COPD (chronic obstructive pulmonary  disease)     Depression     Gait abnormality     GERD (gastroesophageal reflux disease)     HLD (hyperlipidemia)     Hypertension     Insomnia     Migraines     Obesity     Stenosis of cervical spine with myelopathy        Past Surgical History:   Procedure Laterality Date     SECTION      CHOLECYSTECTOMY      COLONOSCOPY      DECOMPRESSION OF CERVICAL SPINE BY POSTERIOR APPROACH N/A 2024    C3-4, C4-5, C5-6.  Dr. Harding    ESOPHAGOGASTRODUODENOSCOPY      TONSILLECTOMY         Family History   Problem Relation Name Age of Onset    Heart disease Mother      Cancer Father      Heart disease Father      Kidney disease Sister         Social History     Socioeconomic History    Marital status:    Tobacco Use    Smoking status: Former     Current packs/day: 0.00     Types: Cigarettes     Quit date: 2024     Years since quittin.2    Smokeless tobacco: Never    Tobacco comments:     Quit 24   Substance and Sexual Activity    Alcohol use: Never    Drug use: Never     Social Determinants of Health     Financial Resource Strain: Low Risk  (2024)    Overall Financial Resource Strain (CARDIA)     Difficulty of Paying Living Expenses: Not hard at all   Food Insecurity: No Food Insecurity (2024)    Hunger Vital Sign     Worried About Running Out of Food in the Last Year: Never true     Ran Out of Food in the Last Year: Never true   Transportation Needs: No Transportation Needs (2024)    TRANSPORTATION NEEDS     Transportation : No   Stress: No Stress Concern Present (2024)    [employer] - Occupational Stress Questionnaire     Feeling of Stress : Not at all   Housing Stability: Low Risk  (2024)    Housing Stability Vital Sign     Unable to Pay for Housing in the Last Year: No     Homeless in the Last Year: No       Review of patient's allergies indicates:   Allergen Reactions    Lisinopril Other (See Comments)    Codeine Rash        Current  "Outpatient Medications   Medication Instructions    albuterol (PROVENTIL/VENTOLIN HFA) 90 mcg/actuation inhaler 2 puffs, Inhalation, Every 4 hours PRN    atorvastatin (LIPITOR) 40 mg, Oral, Nightly    carvediloL (COREG) 25 mg, Oral, 2 times daily    ergocalciferol (ERGOCALCIFEROL) 50,000 Units, Oral, Three times weekly    hydrALAZINE (APRESOLINE) 25 mg, Oral, 3 times daily    hydroCHLOROthiazide (MICROZIDE) 12.5 mg, Oral, Every morning    hydrOXYzine pamoate (VISTARIL) 25 mg, Oral, 2 times daily    methadone (DOLOPHINE) 95 mg, Oral, Daily    omeprazole (PRILOSEC) 40 mg, Oral, Every morning    ondansetron (ZOFRAN-ODT) 4 mg, Oral    pregabalin (LYRICA) 100 mg, Oral, 2 times daily    promethazine (PHENERGAN) 25 mg, Oral, 3 times daily PRN    sertraline (ZOLOFT) 100 mg, Oral, Nightly    sumatriptan (IMITREX) 100 mg, Oral, Daily PRN          Review of Systems   Constitutional:  Negative for chills, fever and weight loss.   HENT:  Negative for congestion, hearing loss, nosebleeds and tinnitus.    Eyes:  Negative for blurred vision, double vision and photophobia.   Respiratory:  Negative for cough, shortness of breath and wheezing.    Cardiovascular:  Negative for chest pain, palpitations and leg swelling.   Gastrointestinal:  Negative for constipation, diarrhea, nausea and vomiting.   Genitourinary:  Negative for dysuria, frequency and urgency.   Musculoskeletal:  Positive for neck pain. Negative for back pain and falls.   Skin:  Negative for itching and rash.   Neurological:  Negative for dizziness, tingling, tremors, sensory change, speech change, seizures, loss of consciousness and headaches.   Psychiatric/Behavioral:  Negative for depression, hallucinations and memory loss. The patient is not nervous/anxious.          OBJECTIVE:     Physical Exam    Visit Vitals  BP 91/62 (BP Location: Right arm, Patient Position: Sitting)   Pulse 81   Resp 16   Ht 5' 3" (1.6 m)   Wt 74.8 kg (165 lb)   BMI 29.23 kg/m²    "     General:  Pleasant, Well-nourished, Well-groomed.    Cardiovascular:  Heart has regular rate and rhythm.    Lungs:  Breathing is quiet, non-lablored    Musculoskeletal:  Incision:  Well healed, no significant drainage, no dehiscence, no significant erythema.  ROM is Decreased secondary to pain    Neurological:  Muscle strength against resistance:   Right Left   Deltoid (C5) 5/5 5/5   Biceps (C5/6) 5/5 5/5   Brachioradialis (C5/6) 5/5 5/5   Triceps (C7) 5/5 5/5   Wrist extension (C7) 5/5 5/5   Finger abduction (C8) 5/5 5/5    5/5 5/5        Hip abduction 5/5 5/5   Hip adduction 5/5 5/5   Knee extension (L3) 5/5 5/5   Knee flexion (L4) 5/5 5/5   Dorsiflexion (L5) 5/5 5/5   EHL (L5) 5/5 5/5   Plantar flexion (S1) 5/5 5/5   Sensation is intact to primary modalities in bilateral upper and lower extremities.    Reflexes:   Right Left   Triceps (C7) 2+ 2+   Biceps (C5) 2+ 2+   Brachioradialis (C6) 2+ 2+   Patellar (L4) 3+ 3+   Achilles (S1) 2+ 2+   Positive Estes's: Bilateral  Gait is normal  Coordination is normal.    Imaging:  All pertinent neuroimaging independently reviewed. Discussed these findings in detail with the patient.    X-rays of the cervical spine dated 7/23/2024 reveals chronic diffuse degenerative changes throughout the cervical spine with interval C3 through C6 laminectomies compared to previous imaging dated 4/27/2024.    ASSESSMENT:       ICD-10-CM ICD-9-CM   1. Stenosis of cervical spine with myelopathy  M48.02 723.0    G99.2 336.3   2. Muscle spasticity  M62.838 728.85       PLAN:     1. Stenosis of cervical spine with myelopathy  - pregabalin (LYRICA) 100 MG capsule; Take 1 capsule (100 mg total) by mouth 2 (two) times daily.  Dispense: 60 capsule; Refill: 0    2. Muscle spasticity      Brie Hidalgo returns today reporting continued spasticity and tightness into the posterior neck trapezius region.  I did take the time to review the patient's updated x-rays with she and her significant  other in clinic today.  I feel it her nerves are likely still trying to heal which is causing some muscle spasticity as well.  We will attempt Lyrica twice daily to hopefully provide her some relief.  I did state should this medication not provide full effectiveness within the next 2 weeks she can double her bedtime dose of this medication.  We will defer additional refills of this medication to her primary care provider.  I did attempt to provide the patient reassurance as she is still in the recovery phase following this procedure.  She verbalizes understanding at this time.  We will plan to see the patient back in clinic on an as-needed basis going forward.  She was advised to notify us with any regression in his symptoms or return of radicular pain.    Follow up if symptoms worsen or fail to improve.        RUY Whelan    Disclaimer:  This note is prepared using voice recognition software and as such is likely to have errors despite attempts at proofreading. Please contact me for questions.

## 2024-08-01 ENCOUNTER — OFFICE VISIT (OUTPATIENT)
Dept: NEUROSURGERY | Facility: CLINIC | Age: 54
End: 2024-08-01
Payer: MEDICAID

## 2024-08-01 VITALS
RESPIRATION RATE: 16 BRPM | HEIGHT: 63 IN | DIASTOLIC BLOOD PRESSURE: 62 MMHG | WEIGHT: 165 LBS | BODY MASS INDEX: 29.23 KG/M2 | SYSTOLIC BLOOD PRESSURE: 91 MMHG | HEART RATE: 81 BPM

## 2024-08-01 DIAGNOSIS — M48.02 STENOSIS OF CERVICAL SPINE WITH MYELOPATHY: Primary | ICD-10-CM

## 2024-08-01 DIAGNOSIS — M62.838 MUSCLE SPASTICITY: ICD-10-CM

## 2024-08-01 DIAGNOSIS — G99.2 STENOSIS OF CERVICAL SPINE WITH MYELOPATHY: Primary | ICD-10-CM

## 2024-08-01 PROCEDURE — 1160F RVW MEDS BY RX/DR IN RCRD: CPT | Mod: CPTII,,, | Performed by: NURSE PRACTITIONER

## 2024-08-01 PROCEDURE — 99024 POSTOP FOLLOW-UP VISIT: CPT | Mod: ,,, | Performed by: NURSE PRACTITIONER

## 2024-08-01 PROCEDURE — 3078F DIAST BP <80 MM HG: CPT | Mod: CPTII,,, | Performed by: NURSE PRACTITIONER

## 2024-08-01 PROCEDURE — 3074F SYST BP LT 130 MM HG: CPT | Mod: CPTII,,, | Performed by: NURSE PRACTITIONER

## 2024-08-01 PROCEDURE — 1159F MED LIST DOCD IN RCRD: CPT | Mod: CPTII,,, | Performed by: NURSE PRACTITIONER

## 2024-08-01 RX ORDER — PREGABALIN 100 MG/1
100 CAPSULE ORAL 2 TIMES DAILY
Qty: 60 CAPSULE | Refills: 0 | Status: SHIPPED | OUTPATIENT
Start: 2024-08-01 | End: 2025-01-30

## 2024-08-01 RX ORDER — ONDANSETRON 4 MG/1
4 TABLET, ORALLY DISINTEGRATING ORAL
COMMUNITY
Start: 2024-07-16

## 2024-08-01 RX ORDER — CARVEDILOL 25 MG/1
25 TABLET ORAL 2 TIMES DAILY
COMMUNITY
Start: 2024-07-20

## 2024-08-01 RX ORDER — SUMATRIPTAN SUCCINATE 100 MG/1
100 TABLET ORAL DAILY PRN
COMMUNITY
Start: 2024-07-24

## 2024-08-13 ENCOUNTER — TELEPHONE (OUTPATIENT)
Dept: NEUROSURGERY | Facility: CLINIC | Age: 54
End: 2024-08-13
Payer: MEDICAID

## 2024-08-13 NOTE — TELEPHONE ENCOUNTER
Unfortunately Dr. Harding does not provide medications this far out from surgery. I did explain this to the patient.  She was encouraged to begin titrating her Lyrica to twice daily.  She was instructed to take this medication nightly for the rest of the week and then attempt increasing to twice daily over the weekend.  Should she have persistent drowsiness with this medication she was instructed to continue with bedtime dosing.  She verbalizes understanding.  She was concerned about some swelling to the muscles in her upper back.  I did explain that this can be part of the healing process.  We will continue to keep an eye on this for her.  She continues to occasionally have some burning and cramping into the lower extremities which can affect her walking.  She is denying any changes in bowel or bladder function.  She was advised to notify us should her symptoms persist.

## 2024-08-27 ENCOUNTER — HOSPITAL ENCOUNTER (EMERGENCY)
Facility: HOSPITAL | Age: 54
Discharge: HOME | End: 2024-08-27
Attending: EMERGENCY MEDICINE
Payer: MEDICAID

## 2024-08-27 VITALS
RESPIRATION RATE: 16 BRPM | DIASTOLIC BLOOD PRESSURE: 94 MMHG | WEIGHT: 170 LBS | HEIGHT: 62 IN | OXYGEN SATURATION: 97 % | HEART RATE: 76 BPM | TEMPERATURE: 98 F | SYSTOLIC BLOOD PRESSURE: 151 MMHG | BODY MASS INDEX: 31.28 KG/M2

## 2024-08-27 DIAGNOSIS — R93.89 THICKENED ENDOMETRIUM: ICD-10-CM

## 2024-08-27 DIAGNOSIS — N95.0 POSTMENOPAUSAL VAGINAL BLEEDING: Primary | ICD-10-CM

## 2024-08-27 LAB
ALBUMIN SERPL-MCNC: 3.5 G/DL (ref 3.5–5)
ALBUMIN/GLOB SERPL: 1 RATIO (ref 1.1–2)
ALP SERPL-CCNC: 126 UNIT/L (ref 40–150)
ALT SERPL-CCNC: 45 UNIT/L (ref 0–55)
ANION GAP SERPL CALC-SCNC: 9 MEQ/L
AST SERPL-CCNC: 24 UNIT/L (ref 5–34)
BACTERIA #/AREA URNS AUTO: NORMAL /HPF
BASOPHILS # BLD AUTO: 0.13 X10(3)/MCL
BASOPHILS NFR BLD AUTO: 1.2 %
BILIRUB SERPL-MCNC: 0.3 MG/DL
BILIRUB UR QL STRIP.AUTO: NEGATIVE
BUN SERPL-MCNC: 20.2 MG/DL (ref 9.8–20.1)
CALCIUM SERPL-MCNC: 8.9 MG/DL (ref 8.4–10.2)
CHLORIDE SERPL-SCNC: 107 MMOL/L (ref 98–107)
CLARITY UR: CLEAR
CO2 SERPL-SCNC: 27 MMOL/L (ref 22–29)
COLOR UR AUTO: COLORLESS
CREAT SERPL-MCNC: 1.09 MG/DL (ref 0.55–1.02)
CREAT/UREA NIT SERPL: 19
EOSINOPHIL # BLD AUTO: 0.19 X10(3)/MCL (ref 0–0.9)
EOSINOPHIL NFR BLD AUTO: 1.8 %
ERYTHROCYTE [DISTWIDTH] IN BLOOD BY AUTOMATED COUNT: 14.6 % (ref 11.5–17)
GFR SERPLBLD CREATININE-BSD FMLA CKD-EPI: >60 ML/MIN/1.73/M2
GLOBULIN SER-MCNC: 3.4 GM/DL (ref 2.4–3.5)
GLUCOSE SERPL-MCNC: 129 MG/DL (ref 74–100)
GLUCOSE UR QL STRIP: NORMAL
HCT VFR BLD AUTO: 41.3 % (ref 37–47)
HGB BLD-MCNC: 13.6 G/DL (ref 12–16)
HGB UR QL STRIP: NEGATIVE
IMM GRANULOCYTES # BLD AUTO: 0.04 X10(3)/MCL (ref 0–0.04)
IMM GRANULOCYTES NFR BLD AUTO: 0.4 %
KETONES UR QL STRIP: NEGATIVE
LEUKOCYTE ESTERASE UR QL STRIP: NEGATIVE
LYMPHOCYTES # BLD AUTO: 5.02 X10(3)/MCL (ref 0.6–4.6)
LYMPHOCYTES NFR BLD AUTO: 47.4 %
MCH RBC QN AUTO: 29.7 PG (ref 27–31)
MCHC RBC AUTO-ENTMCNC: 32.9 G/DL (ref 33–36)
MCV RBC AUTO: 90.2 FL (ref 80–94)
MONOCYTES # BLD AUTO: 0.61 X10(3)/MCL (ref 0.1–1.3)
MONOCYTES NFR BLD AUTO: 5.8 %
NEUTROPHILS # BLD AUTO: 4.61 X10(3)/MCL (ref 2.1–9.2)
NEUTROPHILS NFR BLD AUTO: 43.4 %
NITRITE UR QL STRIP: NEGATIVE
NRBC BLD AUTO-RTO: 0 %
PH UR STRIP: 6.5 [PH]
PLATELET # BLD AUTO: 336 X10(3)/MCL (ref 130–400)
PMV BLD AUTO: 9.4 FL (ref 7.4–10.4)
POTASSIUM SERPL-SCNC: 3.4 MMOL/L (ref 3.5–5.1)
PROT SERPL-MCNC: 6.9 GM/DL (ref 6.4–8.3)
PROT UR QL STRIP: NEGATIVE
RBC # BLD AUTO: 4.58 X10(6)/MCL (ref 4.2–5.4)
RBC #/AREA URNS AUTO: NORMAL /HPF
SODIUM SERPL-SCNC: 143 MMOL/L (ref 136–145)
SP GR UR STRIP.AUTO: 1.01 (ref 1–1.03)
SQUAMOUS #/AREA URNS LPF: NORMAL /HPF
UROBILINOGEN UR STRIP-ACNC: NORMAL
WBC # BLD AUTO: 10.6 X10(3)/MCL (ref 4.5–11.5)
WBC #/AREA URNS AUTO: NORMAL /HPF

## 2024-08-27 PROCEDURE — 85025 COMPLETE CBC W/AUTO DIFF WBC: CPT

## 2024-08-27 PROCEDURE — 93010 ELECTROCARDIOGRAM REPORT: CPT | Mod: ,,, | Performed by: INTERNAL MEDICINE

## 2024-08-27 PROCEDURE — 81001 URINALYSIS AUTO W/SCOPE: CPT

## 2024-08-27 PROCEDURE — 93005 ELECTROCARDIOGRAM TRACING: CPT

## 2024-08-27 PROCEDURE — 99285 EMERGENCY DEPT VISIT HI MDM: CPT | Mod: 25

## 2024-08-27 PROCEDURE — 80053 COMPREHEN METABOLIC PANEL: CPT

## 2024-08-27 NOTE — FIRST PROVIDER EVALUATION
"Medical screening examination initiated.  I have conducted a focused provider triage encounter, findings are as follows:    Brief history of present illness:  53 year old female presents to the ER for evaluation vaginal bleeding x PTA. Patient reports that she has not had a menstrual cycle in 10 years. No recent GYN follow up. Reports some lower abdominal cramping.     Vitals:    08/27/24 1847   BP: (!) 159/92   BP Location: Left arm   Pulse: 83   Resp: 18   Temp: 98.1 °F (36.7 °C)   TempSrc: Oral   SpO2: 95%   Weight: 77.1 kg (170 lb)   Height: 5' 2" (1.575 m)       Pertinent physical exam:  alert, non-labored, tearful and anxious    Brief workup plan:  labs, urine, US    Preliminary workup initiated; this workup will be continued and followed by the physician or advanced practice provider that is assigned to the patient when roomed.  "

## 2024-08-28 LAB
OHS QRS DURATION: 130 MS
OHS QTC CALCULATION: 498 MS

## 2024-08-28 NOTE — ED PROVIDER NOTES
Encounter Date: 2024       History     Chief Complaint   Patient presents with    Vaginal Bleeding     Vaginal bleeding today hasn't have period in 10 years. Unknown last obgyn appt. Lower abdominal cramping and epigastric pain     53-year-old female postmenopausal times 10 years presents to the emergency department for vaginal bleeding today.  Reports she has not had a menstrual cycle in the last 10 years.  Has not seen an OBGYN in many years either.  Reports some lower abdominal cramping as well.  Denies any fever or chills.  Denies any lightheadedness, dizziness, or fainting.    The history is provided by the patient and medical records.     Review of patient's allergies indicates:   Allergen Reactions    Lisinopril Other (See Comments)    Codeine Rash     Past Medical History:   Diagnosis Date    Anxiety     Arm heaviness     COPD (chronic obstructive pulmonary disease)     Depression     Gait abnormality     GERD (gastroesophageal reflux disease)     HLD (hyperlipidemia)     Hypertension     Insomnia     Migraines     Obesity     Stenosis of cervical spine with myelopathy      Past Surgical History:   Procedure Laterality Date     SECTION      CHOLECYSTECTOMY      COLONOSCOPY      DECOMPRESSION OF CERVICAL SPINE BY POSTERIOR APPROACH N/A 2024    C3-4, C4-5, C5-6.  Dr. Harding    ESOPHAGOGASTRODUODENOSCOPY      TONSILLECTOMY       Family History   Problem Relation Name Age of Onset    Heart disease Mother      Cancer Father      Heart disease Father      Kidney disease Sister       Social History     Tobacco Use    Smoking status: Former     Current packs/day: 0.00     Types: Cigarettes     Quit date: 2024     Years since quittin.3    Smokeless tobacco: Never    Tobacco comments:     Quit 24   Substance Use Topics    Alcohol use: Never    Drug use: Never     Review of Systems   Constitutional:  Negative for fever and unexpected weight change.   Gastrointestinal:  Positive for  abdominal pain.   Genitourinary:  Positive for vaginal bleeding.       Physical Exam     Initial Vitals [08/27/24 1847]   BP Pulse Resp Temp SpO2   (!) 159/92 83 18 98.1 °F (36.7 °C) 95 %      MAP       --         Physical Exam    Nursing note and vitals reviewed.  Constitutional: She appears well-developed and well-nourished. No distress.   HENT:   Head: Normocephalic and atraumatic.   Eyes: Conjunctivae are normal. Pupils are equal, round, and reactive to light.   Cardiovascular:  Normal rate and regular rhythm.           Pulmonary/Chest: No respiratory distress.   Abdominal: Abdomen is soft. There is no abdominal tenderness.   Musculoskeletal:         General: Normal range of motion.     Neurological: She is alert and oriented to person, place, and time. GCS score is 15. GCS eye subscore is 4. GCS verbal subscore is 5. GCS motor subscore is 6.   Skin: Skin is warm and dry.   Psychiatric: She has a normal mood and affect.         ED Course   Procedures  Labs Reviewed   COMPREHENSIVE METABOLIC PANEL - Abnormal       Result Value    Sodium 143      Potassium 3.4 (*)     Chloride 107      CO2 27      Glucose 129 (*)     Blood Urea Nitrogen 20.2 (*)     Creatinine 1.09 (*)     Calcium 8.9      Protein Total 6.9      Albumin 3.5      Globulin 3.4      Albumin/Globulin Ratio 1.0 (*)     Bilirubin Total 0.3            ALT 45      AST 24      eGFR >60      Anion Gap 9.0      BUN/Creatinine Ratio 19     CBC WITH DIFFERENTIAL - Abnormal    WBC 10.60      RBC 4.58      Hgb 13.6      Hct 41.3      MCV 90.2      MCH 29.7      MCHC 32.9 (*)     RDW 14.6      Platelet 336      MPV 9.4      Neut % 43.4      Lymph % 47.4      Mono % 5.8      Eos % 1.8      Basophil % 1.2      Lymph # 5.02 (*)     Neut # 4.61      Mono # 0.61      Eos # 0.19      Baso # 0.13      IG# 0.04      IG% 0.4      NRBC% 0.0     URINALYSIS, REFLEX TO URINE CULTURE - Normal    Color, UA Colorless      Appearance, UA Clear      Specific [place], UA  1.014      pH, UA 6.5      Protein, UA Negative      Glucose, UA Normal      Ketones, UA Negative      Blood, UA Negative      Bilirubin, UA Negative      Urobilinogen, UA Normal      Nitrites, UA Negative      Leukocyte Esterase, UA Negative      RBC, UA 0-5      WBC, UA 0-5      Bacteria, UA None Seen      Squamous Epithelial Cells, UA Trace     CBC W/ AUTO DIFFERENTIAL    Narrative:     The following orders were created for panel order CBC Auto Differential.  Procedure                               Abnormality         Status                     ---------                               -----------         ------                     CBC with Differential[[id number]]       Abnormal            Final result                 Please view results for these tests on the individual orders.     EKG Readings: (Independently Interpreted)   Initial Reading: No STEMI. Rhythm: Normal Sinus Rhythm. Heart Rate: 80. Conduction: RBBB. Clinical Impression: Normal Sinus Rhythm with RBBB   08/27/2024 @ 1840       Imaging Results              US PELVIS COMPLETE NON OB WITH DOPPLER (XPD) (Final result)  Result time 08/27/24 20:23:13   Procedure changed from US Pelvis Complete Non OB     Final result by Riley White MD (08/27/24 20:23:13)                   Impression:      Thickened endometrial complex for postmenopausal.  Recommend histopathologic sampling.      Electronically signed by: Riley White MD  Date:    08/27/2024  Time:    20:23               Narrative:    EXAMINATION:  Non OB pelvic ultrasound    CLINICAL HISTORY:  Spotting    TECHNIQUE:  Multiple sonographic images the pelvis were obtained by department sonographer.    COMPARISON:  None    FINDINGS:  Uterus is anteverted measuring 6.4 x 2.8 x 3.5 cm.  Myometrium demonstrates normal echogenicity.  Endometrial complex measures 7 mm without evidence for mass effect or effacement.  Patient is postmenopausal.    The right ovary measures 2.1 x 1.6 x 1.8 cm.  Left ovary measures 2.3  x 1.2 x 2.1 cm.  Normal arterial inflow and venous outflow waveforms are identified.  No evidence for adnexal mass.  No fluid in the pelvic cul-de-sac.                                       Medications - No data to display  Medical Decision Making  Problems Addressed:  Postmenopausal vaginal bleeding: acute illness or injury  Thickened endometrium: acute illness or injury        ED assessment:    Ms. Hidalgo presented for evaluation of postmenopausal bleeding with last menstrual period over 10 years ago.  Reports spotting when wiping, no significant blood loss.  Hemodynamically stable.  Mild abdominal cramping.    Differential diagnosis (including but not limited to):   Vaginal bleeding, hematuria, postmenopausal bleeding, uterine malignancy    ED management:   Laboratory evaluation unremarkable.  Ultrasound demonstrates thickened endometrial complex.  I discussed the presentation of postmenopausal bleeding with concerns for malignancy particularly in light of abnormal ultrasound with the patient and her son at the bedside.  She does not currently have an OBGYN.  Referral has been made.  Instructed on importance of follow up as likely needs biopsy evaluation.  She verbalizes understanding. ED return precautions reviewed at the bedside and provided in the written discharge instructions. All questions answered to the best of my ability.     Amount and/or Complexity of Data Reviewed  Independent historian: none   Summary of history:   External data reviewed:  No encounters pertinent to this complaint  Summary of data reviewed:   Risk and benefits of testing: discussed   Labs: ordered and reviewed  Radiology: ordered and reviewed      Risk  Shared decision making     Critical Care  none    I, Nadeeg Heller MD personally performed the history, PE, MDM, and procedures as documented above and agree with the scribe's documentation.                                     Clinical Impression:  Final diagnoses:  [N95.0]  Postmenopausal vaginal bleeding (Primary)  [R93.89] Thickened endometrium          ED Disposition Condition    Discharge Stable          ED Prescriptions    None       Follow-up Information       Follow up With Specialties Details Why Contact Info    [facility] - GYN Gynecology Schedule an appointment as soon as possible for a visit  Call the clinic in the morning to ensure that they have received the referral and our scheduling you for a follow up appointment. [address] [place] [phone]    [facility] - Emergency Dept Emergency Medicine  As needed, If symptoms worsen [address] [facility] [phone]             Nadege Heller MD  08/30/24 6875

## 2024-08-28 NOTE — DISCHARGE INSTRUCTIONS
You need to follow up with a gynecologist for further work up of post menopausal bleeding including a likely biopsy to evaluate for possibility of cancer.   Please call the clinic

## 2024-08-29 ENCOUNTER — OFFICE VISIT (OUTPATIENT)
Dept: OBSTETRICS AND GYNECOLOGY | Facility: CLINIC | Age: 54
End: 2024-08-29
Payer: MEDICAID

## 2024-08-29 VITALS
WEIGHT: 174.63 LBS | SYSTOLIC BLOOD PRESSURE: 142 MMHG | TEMPERATURE: 97 F | HEIGHT: 62 IN | DIASTOLIC BLOOD PRESSURE: 86 MMHG | BODY MASS INDEX: 32.14 KG/M2

## 2024-08-29 DIAGNOSIS — I10 HYPERTENSION, UNSPECIFIED TYPE: ICD-10-CM

## 2024-08-29 DIAGNOSIS — Z12.39 ENCOUNTER FOR SCREENING FOR MALIGNANT NEOPLASM OF BREAST, UNSPECIFIED SCREENING MODALITY: ICD-10-CM

## 2024-08-29 DIAGNOSIS — N95.0 PMB (POSTMENOPAUSAL BLEEDING): Primary | ICD-10-CM

## 2024-08-29 DIAGNOSIS — R93.89 THICKENED ENDOMETRIUM: ICD-10-CM

## 2024-08-29 DIAGNOSIS — Z12.4 CERVICAL CANCER SCREENING: ICD-10-CM

## 2024-08-29 PROCEDURE — 87661 TRICHOMONAS VAGINALIS AMPLIF: CPT | Performed by: OBSTETRICS & GYNECOLOGY

## 2024-08-29 PROCEDURE — 87491 CHLMYD TRACH DNA AMP PROBE: CPT | Performed by: OBSTETRICS & GYNECOLOGY

## 2024-08-29 PROCEDURE — 3077F SYST BP >= 140 MM HG: CPT | Mod: CPTII,,, | Performed by: OBSTETRICS & GYNECOLOGY

## 2024-08-29 PROCEDURE — 3079F DIAST BP 80-89 MM HG: CPT | Mod: CPTII,,, | Performed by: OBSTETRICS & GYNECOLOGY

## 2024-08-29 PROCEDURE — 3008F BODY MASS INDEX DOCD: CPT | Mod: CPTII,,, | Performed by: OBSTETRICS & GYNECOLOGY

## 2024-08-29 PROCEDURE — 99204 OFFICE O/P NEW MOD 45 MIN: CPT | Mod: ,,, | Performed by: OBSTETRICS & GYNECOLOGY

## 2024-08-29 PROCEDURE — 87591 N.GONORRHOEAE DNA AMP PROB: CPT | Performed by: OBSTETRICS & GYNECOLOGY

## 2024-08-29 PROCEDURE — 87624 HPV HI-RISK TYP POOLED RSLT: CPT | Performed by: OBSTETRICS & GYNECOLOGY

## 2024-08-29 RX ORDER — QUETIAPINE FUMARATE 50 MG/1
50 TABLET, FILM COATED ORAL NIGHTLY
COMMUNITY
Start: 2024-08-16

## 2024-08-29 NOTE — PROGRESS NOTES
"  Chief Complaint     Post Menopausal Bleeding    HPI:     Patient is a 53 y.o.  presents today referred for PMB, thickened endometrium. Reports had "light blood" noted x1 day and reported to ER for evaluation.  US in ER showed thickened endometrium of 7 mm. No further bleeding.      Gyn History:    Menstrual History  Cycle: No  Menarche Age: 1 years  No Cycle Reason: Other  Other Reason: postmenopausal    Menopause  Menopause Age: 43 years  Post Menopausal Bleeding: Yes (only for 1 day and it was a speak)  Hormone Replacement Therapy: No    Pap History  Last pap date:  (x10 years per pt)  History of Abnormal Pap: (!) Yes  Treatment used: other (Cyrotherapy)  HPV Vaccine Completed: No    [place]  Sexually Active: Yes  Sexual Orientation: heterosexual  Postcoital Bleeding: No  Dyspareunia: No  STI History: No  Contraception: No    Breast History  Last Breast Imaging Date: Yes  Date: 23 (Need Additional Imaging; Diagn [facility] 23 benign)  History of Abnormal Breast Imaging : No  History of Breast Biopsy: No    Pelvic u/s 24:  FINDINGS:  Uterus is anteverted measuring 6.4 x 2.8 x 3.5 cm.  Myometrium demonstrates normal echogenicity.  Endometrial complex measures 7 mm without evidence for mass effect or effacement.  Patient is postmenopausal.  The right ovary measures 2.1 x 1.6 x 1.8 cm.  Left ovary measures 2.3 x 1.2 x 2.1 cm.  Normal arterial inflow and venous outflow waveforms are identified.  No evidence for adnexal mass.  No fluid in the pelvic cul-de-sac.  Impression:  Thickened endometrial complex for postmenopausal.  Recommend histopathologic sampling.      Past Medical History:   Diagnosis Date    Anxiety     Arm heaviness     COPD (chronic obstructive pulmonary disease)     Depression     Gait abnormality     GERD (gastroesophageal reflux disease)     HLD (hyperlipidemia)     Hypertension     Insomnia     Migraines     Obesity     Stenosis of cervical spine with myelopathy        Past " Surgical History:   Procedure Laterality Date    Cervical Cyrotherapy       SECTION       SECTION, CLASSIC       SECTION, CLASSIC      CHOLECYSTECTOMY      COLONOSCOPY      DECOMPRESSION OF CERVICAL SPINE BY POSTERIOR APPROACH N/A 2024    C3-4, C4-5, C5-6.  Dr. Harding    ESOPHAGOGASTRODUODENOSCOPY      TONSILLECTOMY         Family History   Problem Relation Name Age of Onset    Cancer Father      Heart disease Father      Heart disease Mother      Kidney disease Sister      Breast cancer Neg Hx      Colon cancer Neg Hx      Uterine cancer Neg Hx      Cervical cancer Neg Hx      Ovarian cancer Neg Hx         OB History          3    Para   3    Term   3            AB        Living   3         SAB        IAB        Ectopic        Multiple        Live Births   3                 Current Outpatient Medications on File Prior to Visit   Medication Sig Dispense Refill    albuterol (PROVENTIL/VENTOLIN HFA) 90 mcg/actuation inhaler Inhale 2 puffs into the lungs every 4 (four) hours as needed for Shortness of Breath or Wheezing.      atorvastatin (LIPITOR) 40 MG tablet Take 40 mg by mouth every evening.      carvediloL (COREG) 25 MG tablet Take 25 mg by mouth 2 (two) times daily.      ergocalciferol (ERGOCALCIFEROL) 50,000 unit Cap Take 50,000 Units by mouth 3 (three) times a week.      hydrALAZINE (APRESOLINE) 25 MG tablet Take 25 mg by mouth 3 (three) times daily.      hydroCHLOROthiazide (MICROZIDE) 12.5 mg capsule Take 12.5 mg by mouth every morning.      hydrOXYzine pamoate (VISTARIL) 25 MG Cap Take 25 mg by mouth 2 (two) times daily.      omeprazole (PRILOSEC) 40 MG capsule Take 40 mg by mouth every morning.      ondansetron (ZOFRAN-ODT) 4 MG TbDL Take 4 mg by mouth.      pregabalin (LYRICA) 100 MG capsule Take 1 capsule (100 mg total) by mouth 2 (two) times daily. 60 capsule 0    promethazine (PHENERGAN) 25 MG tablet Take 25 mg by mouth 3 (three) times daily as needed for Nausea.  "     QUEtiapine (SEROQUEL) 50 MG tablet Take 50 mg by mouth every evening.      sumatriptan (IMITREX) 100 MG tablet Take 100 mg by mouth daily as needed.      methadone (DOLOPHINE) 10 mg/mL solution Take 9.5 mLs (95 mg total) by mouth once daily. for 1 day 1 mL 0    sertraline (ZOLOFT) 100 MG tablet Take 100 mg by mouth every evening.       No current facility-administered medications on file prior to visit.       Review of Systems:       Review of Systems   Constitutional:  Negative for chills and fever.   Gastrointestinal:  Negative for abdominal pain, constipation and diarrhea.   Genitourinary:  Positive for vaginal bleeding and vaginal odor. Negative for bladder incontinence, decreased libido, dysmenorrhea, dyspareunia, dysuria, flank pain, frequency, genital sores, hematuria, hot flashes, menorrhagia, menstrual problem, pelvic pain, urgency, vaginal discharge, vaginal pain, urinary incontinence, postcoital bleeding, postmenopausal bleeding and vaginal dryness.        Physical Exam:    BP (!) 142/86 (BP Location: Left arm, Patient Position: Sitting, BP Method: Medium (Manual))   Temp 97.2 °F (36.2 °C) (Temporal)   Ht 5' 2" (1.575 m)   Wt 79.2 kg (174 lb 9.6 oz)   BMI 31.93 kg/m²     Physical Exam   General Exam:    General Appearance: alert, in no acute distress, normal, well nourished.  Psych:  Orientation: time, place, person.  Mood/Affect: Normal   Abdomen:  - Soft. Non-tender. No rebound tenderness or guardingNo masses. Liver normal. Spleen normal. No hernia palpable.  Pelvis:   - Vulva: Normal   - Perianal skin: Normal   - Urethra: Normal meatus. Q-tip: Not performed   - Vagina: NormalVaginal discharge present: . Cystocele: Absent. Rectocele: Absent   - Cervix: Normal. Cervical motion tenderness Absent.  Fixed in upper vagina.  - Uterus: Mobile. Non-tender.   - Adnexal: Normal      Assessment:   1. PMB (postmenopausal bleeding)    2. Thickened endometrium    3. BMI 31.0-31.9,adult    4. Hypertension, " unspecified type    5. Encounter for screening for malignant neoplasm of breast, unspecified screening modality  -     Mammo Digital Screening Bilat w/ Edward; Future; Expected date: 08/29/2024             Plan:     Reviewed ultrasound with patient     PAP w HPV  GC CZ TV    Discussed thickened endometrium  Recommend endometrial sampling to rule out malignancy   Discussed office endometrial biopsy and hysteroscope with dilation and curettage and associated limitations, risks, and benefits of each procedure.   Patient elects hysteroscope with dilation and curettage in the OR  Plan for procedure on 9/27/24 pending PAP, Cultures     [facility] order given    RTC 2 weeks results, pre admit     This note was transcribed by Meghna Ayala. There may be transcription errors as a result, however minimal. Effort has been made to ensure accuracy of transcription, but any obvious errors or omissions should be clarified with the author of the document.       I agree with the above documentation.

## 2024-08-31 ENCOUNTER — TELEPHONE (OUTPATIENT)
Dept: GYNECOLOGY | Facility: CLINIC | Age: 54
End: 2024-08-31
Payer: MEDICAID

## 2024-08-31 NOTE — TELEPHONE ENCOUNTER
Physician called patient to discuss postmenopausal bleeding and need for endometrial sampling and likely hysteroscopy D&C. Patient reports that she saw a provider already on 8/29 and has been scheduled for HDC D&C with them. No need to follow up in our clinic for care.     Nicolette Magana,   [employer] [facility] PGY-3

## 2024-09-10 ENCOUNTER — TELEPHONE (OUTPATIENT)
Dept: OBSTETRICS AND GYNECOLOGY | Facility: CLINIC | Age: 54
End: 2024-09-10

## 2024-09-10 ENCOUNTER — OFFICE VISIT (OUTPATIENT)
Dept: OBSTETRICS AND GYNECOLOGY | Facility: CLINIC | Age: 54
End: 2024-09-10
Payer: MEDICAID

## 2024-09-10 VITALS
WEIGHT: 173.81 LBS | SYSTOLIC BLOOD PRESSURE: 138 MMHG | HEIGHT: 62 IN | DIASTOLIC BLOOD PRESSURE: 84 MMHG | BODY MASS INDEX: 31.99 KG/M2

## 2024-09-10 DIAGNOSIS — N95.0 PMB (POSTMENOPAUSAL BLEEDING): Primary | ICD-10-CM

## 2024-09-10 DIAGNOSIS — R93.89 THICKENED ENDOMETRIUM: ICD-10-CM

## 2024-09-10 PROCEDURE — 3008F BODY MASS INDEX DOCD: CPT | Mod: CPTII,,, | Performed by: OBSTETRICS & GYNECOLOGY

## 2024-09-10 PROCEDURE — 3079F DIAST BP 80-89 MM HG: CPT | Mod: CPTII,,, | Performed by: OBSTETRICS & GYNECOLOGY

## 2024-09-10 PROCEDURE — 1159F MED LIST DOCD IN RCRD: CPT | Mod: CPTII,,, | Performed by: OBSTETRICS & GYNECOLOGY

## 2024-09-10 PROCEDURE — 99214 OFFICE O/P EST MOD 30 MIN: CPT | Mod: ,,, | Performed by: OBSTETRICS & GYNECOLOGY

## 2024-09-10 PROCEDURE — 3075F SYST BP GE 130 - 139MM HG: CPT | Mod: CPTII,,, | Performed by: OBSTETRICS & GYNECOLOGY

## 2024-09-10 RX ORDER — SODIUM CHLORIDE 9 MG/ML
INJECTION, SOLUTION INTRAVENOUS CONTINUOUS
OUTPATIENT
Start: 2024-09-10

## 2024-09-10 RX ORDER — MUPIROCIN 20 MG/G
OINTMENT TOPICAL
OUTPATIENT
Start: 2024-09-10

## 2024-09-10 RX ORDER — FAMOTIDINE 20 MG/1
20 TABLET, FILM COATED ORAL
OUTPATIENT
Start: 2024-09-10

## 2024-09-10 RX ORDER — CEFAZOLIN SODIUM 2 G/50ML
2 SOLUTION INTRAVENOUS
OUTPATIENT
Start: 2024-09-10

## 2024-09-10 NOTE — H&P (VIEW-ONLY)
"History & Physical    Subjective     History of Present Illness:  Patient is a 53 y.o. female  presents today to pre admit for hysteroscope with dilation and curettage on  due to PMB, thickened endometrium.     PAP WNL Negative HPV , negative GC CZ TV     BP today in clinic 146/82, 138/84  Pt did not take BP medication this AM.     Gyn History:    Menstrual History  Cycle: No  Menarche Age: 1 years    Menopause  Menopause Age: 43 years  Post Menopausal Bleeding: Yes  Hormone Replacement Therapy: No    Pap History  Last pap date: 24  Result: Normal (NIL, Negative HPV/GC/CZ/TV)  History of Abnormal Pap: (!) Yes  Treatment used: other ([facility])  HPV Vaccine Completed: No    [place]  Sexually Active: Yes  Sexual Orientation: heterosexual  Postcoital Bleeding: No  Dyspareunia: No  STI History: No  Contraception: No    Breast History  Last Breast Imaging Date: Yes  Date: 23  History of Abnormal Breast Imaging : (!) Yes (DX MMG WNL)  History of Breast Biopsy: No      Per previous note 24:  Patient is a 53 y.o.  presents today referred for PMB, thickened endometrium. Reports had "light blood" noted x1 day and reported to ER for evaluation.  US in ER showed thickened endometrium of 7 mm. No further bleeding.       Pelvic u/s 24:  FINDINGS:  Uterus is anteverted measuring 6.4 x 2.8 x 3.5 cm.  Myometrium demonstrates normal echogenicity.  Endometrial complex measures 7 mm without evidence for mass effect or effacement.  Patient is postmenopausal.  The right ovary measures 2.1 x 1.6 x 1.8 cm.  Left ovary measures 2.3 x 1.2 x 2.1 cm.  Normal arterial inflow and venous outflow waveforms are identified.  No evidence for adnexal mass.  No fluid in the pelvic cul-de-sac.  Impression:  Thickened endometrial complex for postmenopausal.  Recommend histopathologic sampling.         Chief Complaint   Patient presents with    Pre-op Exam     Here for GYN pre-admit.       Review of patient's " allergies indicates:   Allergen Reactions    Codeine Rash       Current Outpatient Medications   Medication Sig Dispense Refill    albuterol (PROVENTIL/VENTOLIN HFA) 90 mcg/actuation inhaler Inhale 2 puffs into the lungs every 4 (four) hours as needed for Shortness of Breath or Wheezing.      atorvastatin (LIPITOR) 40 MG tablet Take 40 mg by mouth every evening.      carvediloL (COREG) 25 MG tablet Take 25 mg by mouth 2 (two) times daily.      ergocalciferol (ERGOCALCIFEROL) 50,000 unit Cap Take 50,000 Units by mouth 3 (three) times a week.      hydrALAZINE (APRESOLINE) 25 MG tablet Take 25 mg by mouth 3 (three) times daily.      hydroCHLOROthiazide (MICROZIDE) 12.5 mg capsule Take 12.5 mg by mouth every morning.      hydrOXYzine pamoate (VISTARIL) 25 MG Cap Take 25 mg by mouth 2 (two) times daily.      omeprazole (PRILOSEC) 40 MG capsule Take 40 mg by mouth every morning.      ondansetron (ZOFRAN-ODT) 4 MG TbDL Take 4 mg by mouth.      pregabalin (LYRICA) 100 MG capsule Take 1 capsule (100 mg total) by mouth 2 (two) times daily. 60 capsule 0    promethazine (PHENERGAN) 25 MG tablet Take 25 mg by mouth 3 (three) times daily as needed for Nausea.      QUEtiapine (SEROQUEL) 50 MG tablet Take 50 mg by mouth every evening.      sertraline (ZOLOFT) 100 MG tablet Take 100 mg by mouth every evening.      sumatriptan (IMITREX) 100 MG tablet Take 100 mg by mouth daily as needed.      methadone (DOLOPHINE) 10 mg/mL solution Take 9.5 mLs (95 mg total) by mouth once daily. for 1 day 1 mL 0     No current facility-administered medications for this visit.       Past Medical History:   Diagnosis Date    Anxiety     Arm heaviness     COPD (chronic obstructive pulmonary disease)     Depression     Gait abnormality     GERD (gastroesophageal reflux disease)     HLD (hyperlipidemia)     Hypertension     Insomnia     Migraines     Obesity     Stenosis of cervical spine with myelopathy      Past Surgical History:   Procedure Laterality  "Date    Cervical Cyrotherapy       SECTION       SECTION, CLASSIC       SECTION, CLASSIC      CHOLECYSTECTOMY      COLONOSCOPY      DECOMPRESSION OF CERVICAL SPINE BY POSTERIOR APPROACH N/A 2024    C3-4, C4-5, C5-6.  Dr. Harding    ESOPHAGOGASTRODUODENOSCOPY      TONSILLECTOMY       Family History   Problem Relation Name Age of Onset    Cancer Father      Heart disease Father      Heart disease Mother      Kidney disease Sister      Breast cancer Neg Hx      Colon cancer Neg Hx      Uterine cancer Neg Hx      Cervical cancer Neg Hx      Ovarian cancer Neg Hx       Social History     Tobacco Use    Smoking status: Every Day     Current packs/day: 0.00     Types: Cigarettes     Last attempt to quit: 2024     Years since quittin.3    Smokeless tobacco: Never   Substance Use Topics    Alcohol use: Never    Drug use: Never        Review of Systems:  Review of Systems  Constitutional:  Negative for chills and fever.   Gastrointestinal:  Negative for abdominal pain, constipation and diarrhea.   Genitourinary:  Positive for vaginal bleeding and vaginal odor. Negative for bladder incontinence, decreased libido, dysmenorrhea, dyspareunia, dysuria, flank pain, frequency, genital sores, hematuria, hot flashes, menorrhagia, menstrual problem, pelvic pain, urgency, vaginal discharge, vaginal pain, urinary incontinence, postcoital bleeding, postmenopausal bleeding and vaginal dryness.         Objective     Vital Signs (Most Recent)  BP: (!) 146/82 (09/10/24 1015)  5' 2" (1.575 m)  78.8 kg (173 lb 12.8 oz)     Physical Exam:  Physical Exam    Physical Exam  Gen: NAD  Cardio: RRR  Lungs CTA b/l  Abd: Soft, NT  Ext: no CCE         Assessment and Plan   1. PMB (postmenopausal bleeding)  - Case Request Operating Room: HYSTEROSCOPY, WITH DILATION AND CURETTAGE OF UTERUS  - Full code; Standing  - Vital signs; Standing  - Insert peripheral IV; Standing  - Magallon to Gravity; Standing  - POCT glucose; " Standing  - Notify physician if BS > 180 for hysterectomy patients; Standing  - Chlorhexidine (CHG) 2% Wipes; Standing  - Notify Physician/Vital Signs Parameters Urine output less than 0.5mL/kg/hr (with indwelling catheter) or 30 mL/hr (without indwelling catheter) or blood glucose greater than 200 mg/dL; Standing  - Notify physician; Standing  - Notify Physician - Potential Need of Opioid Reversal; Standing  - Diet NPO; Standing  - Chlorohexidine Gluconate Bath; Standing  - Place in Outpatient; Standing  - Place sequential compression device; Standing  - Comprehensive metabolic panel; Standing  - CBC auto differential; Standing  - Pregnancy, urine rapid; Standing  - Urinalysis, Reflex to Urine Culture; Standing  - Type & Screen Pre Op; Standing    2. Thickened endometrium  - Case Request Operating Room: HYSTEROSCOPY, WITH DILATION AND CURETTAGE OF UTERUS  - Full code; Standing  - Vital signs; Standing  - Insert peripheral IV; Standing  - Magallon to Gravity; Standing  - POCT glucose; Standing  - Notify physician if BS > 180 for hysterectomy patients; Standing  - Chlorhexidine (CHG) 2% Wipes; Standing  - Notify Physician/Vital Signs Parameters Urine output less than 0.5mL/kg/hr (with indwelling catheter) or 30 mL/hr (without indwelling catheter) or blood glucose greater than 200 mg/dL; Standing  - Notify physician; Standing  - Notify Physician - Potential Need of Opioid Reversal; Standing  - Diet NPO; Standing  - Chlorohexidine Gluconate Bath; Standing  - Place in Outpatient; Standing  - Place sequential compression device; Standing  - Comprehensive metabolic panel; Standing  - CBC auto differential; Standing  - Pregnancy, urine rapid; Standing  - Urinalysis, Reflex to Urine Culture; Standing  - Type & Screen Pre Op; Standing        PLAN:  Pap normal    Recommend endometrial sampling to rule out cancer  Patient elects hysteroscope with dilation and curettage in the OR    Explained procedure in detail both in office and  hospital. Explained expectations, treatment. goals, failures, complications. Reviewed premeds and postop care. Precautions for fever, bleeding excessively, and severe pain- pt advised to call immediately or present to ER. Patient's questions answered.     Discussed risks including but not limited to the following: pain, bleeding, infection, uterine perforation with subsequent damage to bowel, bladder or other abdominal or pelvic organs, need for open abdominal incision to repair injuries, hysterectomy. Patient acknowledges risks and desires to proceed with surgery. All questions were entertained and answered.     Consent given, NPO after midnight prior to procedure  Will plan for hysteroscope with dilation and curettage on 9/27/274     RTC Post op     This note was transcribed by Meghna Ayala. There may be transcription errors as a result, however minimal. Effort has been made to ensure accuracy of transcription, but any obvious errors or omissions should be clarified with the author of the document.       I agree with the above documentation.

## 2024-09-10 NOTE — PROGRESS NOTES
"History & Physical    Subjective     History of Present Illness:  Patient is a 53 y.o. female  presents today to pre admit for hysteroscope with dilation and curettage on  due to PMB, thickened endometrium.     PAP WNL Negative HPV , negative GC CZ TV     BP today in clinic 146/82, 138/84  Pt did not take BP medication this AM.     Gyn History:    Menstrual History  Cycle: No  Menarche Age: 1 years    Menopause  Menopause Age: 43 years  Post Menopausal Bleeding: Yes  Hormone Replacement Therapy: No    Pap History  Last pap date: 24  Result: Normal (NIL, Negative HPV/GC/CZ/TV)  History of Abnormal Pap: (!) Yes  Treatment used: other ([facility])  HPV Vaccine Completed: No    Short  Sexually Active: Yes  Sexual Orientation: heterosexual  Postcoital Bleeding: No  Dyspareunia: No  STI History: No  Contraception: No    Breast History  Last Breast Imaging Date: Yes  Date: 23  History of Abnormal Breast Imaging : (!) Yes (DX MMG WNL)  History of Breast Biopsy: No      Per previous note 24:  Patient is a 53 y.o.  presents today referred for PMB, thickened endometrium. Reports had "light blood" noted x1 day and reported to ER for evaluation.  US in ER showed thickened endometrium of 7 mm. No further bleeding.       Pelvic u/s 24:  FINDINGS:  Uterus is anteverted measuring 6.4 x 2.8 x 3.5 cm.  Myometrium demonstrates normal echogenicity.  Endometrial complex measures 7 mm without evidence for mass effect or effacement.  Patient is postmenopausal.  The right ovary measures 2.1 x 1.6 x 1.8 cm.  Left ovary measures 2.3 x 1.2 x 2.1 cm.  Normal arterial inflow and venous outflow waveforms are identified.  No evidence for adnexal mass.  No fluid in the pelvic cul-de-sac.  Impression:  Thickened endometrial complex for postmenopausal.  Recommend histopathologic sampling.         Chief Complaint   Patient presents with    Pre-op Exam     Here for GYN pre-admit.       Review of patient's " allergies indicates:   Allergen Reactions    Codeine Rash       Current Outpatient Medications   Medication Sig Dispense Refill    albuterol (PROVENTIL/VENTOLIN HFA) 90 mcg/actuation inhaler Inhale 2 puffs into the lungs every 4 (four) hours as needed for Shortness of Breath or Wheezing.      atorvastatin (LIPITOR) 40 MG tablet Take 40 mg by mouth every evening.      carvediloL (COREG) 25 MG tablet Take 25 mg by mouth 2 (two) times daily.      ergocalciferol (ERGOCALCIFEROL) 50,000 unit Cap Take 50,000 Units by mouth 3 (three) times a week.      hydrALAZINE (APRESOLINE) 25 MG tablet Take 25 mg by mouth 3 (three) times daily.      hydroCHLOROthiazide (MICROZIDE) 12.5 mg capsule Take 12.5 mg by mouth every morning.      hydrOXYzine pamoate (VISTARIL) 25 MG Cap Take 25 mg by mouth 2 (two) times daily.      omeprazole (PRILOSEC) 40 MG capsule Take 40 mg by mouth every morning.      ondansetron (ZOFRAN-ODT) 4 MG TbDL Take 4 mg by mouth.      pregabalin (LYRICA) 100 MG capsule Take 1 capsule (100 mg total) by mouth 2 (two) times daily. 60 capsule 0    promethazine (PHENERGAN) 25 MG tablet Take 25 mg by mouth 3 (three) times daily as needed for Nausea.      QUEtiapine (SEROQUEL) 50 MG tablet Take 50 mg by mouth every evening.      sertraline (ZOLOFT) 100 MG tablet Take 100 mg by mouth every evening.      sumatriptan (IMITREX) 100 MG tablet Take 100 mg by mouth daily as needed.      methadone (DOLOPHINE) 10 mg/mL solution Take 9.5 mLs (95 mg total) by mouth once daily. for 1 day 1 mL 0     No current facility-administered medications for this visit.       Past Medical History:   Diagnosis Date    Anxiety     Arm heaviness     COPD (chronic obstructive pulmonary disease)     Depression     Gait abnormality     GERD (gastroesophageal reflux disease)     HLD (hyperlipidemia)     Hypertension     Insomnia     Migraines     Obesity     Stenosis of cervical spine with myelopathy      Past Surgical History:   Procedure Laterality  "Date    Cervical Cyrotherapy       SECTION       SECTION, CLASSIC       SECTION, CLASSIC      CHOLECYSTECTOMY      COLONOSCOPY      DECOMPRESSION OF CERVICAL SPINE BY POSTERIOR APPROACH N/A 2024    C3-4, C4-5, C5-6.  Dr. Harding    ESOPHAGOGASTRODUODENOSCOPY      TONSILLECTOMY       Family History   Problem Relation Name Age of Onset    Cancer Father      Heart disease Father      Heart disease Mother      Kidney disease Sister      Breast cancer Neg Hx      Colon cancer Neg Hx      Uterine cancer Neg Hx      Cervical cancer Neg Hx      Ovarian cancer Neg Hx       Social History     Tobacco Use    Smoking status: Every Day     Current packs/day: 0.00     Types: Cigarettes     Last attempt to quit: 2024     Years since quittin.3    Smokeless tobacco: Never   Substance Use Topics    Alcohol use: Never    Drug use: Never        Review of Systems:  Review of Systems  Constitutional:  Negative for chills and fever.   Gastrointestinal:  Negative for abdominal pain, constipation and diarrhea.   Genitourinary:  Positive for vaginal bleeding and vaginal odor. Negative for bladder incontinence, decreased libido, dysmenorrhea, dyspareunia, dysuria, flank pain, frequency, genital sores, hematuria, hot flashes, menorrhagia, menstrual problem, pelvic pain, urgency, vaginal discharge, vaginal pain, urinary incontinence, postcoital bleeding, postmenopausal bleeding and vaginal dryness.         Objective     Vital Signs (Most Recent)  BP: (!) 146/82 (09/10/24 1015)  5' 2" (1.575 m)  78.8 kg (173 lb 12.8 oz)     Physical Exam:  Physical Exam    Physical Exam  Gen: NAD  Cardio: RRR  Lungs CTA b/l  Abd: Soft, NT  Ext: no CCE         Assessment and Plan   1. PMB (postmenopausal bleeding)  - Case Request Operating Room: HYSTEROSCOPY, WITH DILATION AND CURETTAGE OF UTERUS  - Full code; Standing  - Vital signs; Standing  - Insert peripheral IV; Standing  - Magallon to Gravity; Standing  - POCT glucose; " Standing  - Notify physician if BS > 180 for hysterectomy patients; Standing  - Chlorhexidine (CHG) 2% Wipes; Standing  - Notify Physician/Vital Signs Parameters Urine output less than 0.5mL/kg/hr (with indwelling catheter) or 30 mL/hr (without indwelling catheter) or blood glucose greater than 200 mg/dL; Standing  - Notify physician; Standing  - Notify Physician - Potential Need of Opioid Reversal; Standing  - Diet NPO; Standing  - Chlorohexidine Gluconate Bath; Standing  - Place in Outpatient; Standing  - Place sequential compression device; Standing  - Comprehensive metabolic panel; Standing  - CBC auto differential; Standing  - Pregnancy, urine rapid; Standing  - Urinalysis, Reflex to Urine Culture; Standing  - Type & Screen Pre Op; Standing    2. Thickened endometrium  - Case Request Operating Room: HYSTEROSCOPY, WITH DILATION AND CURETTAGE OF UTERUS  - Full code; Standing  - Vital signs; Standing  - Insert peripheral IV; Standing  - Magallon to Gravity; Standing  - POCT glucose; Standing  - Notify physician if BS > 180 for hysterectomy patients; Standing  - Chlorhexidine (CHG) 2% Wipes; Standing  - Notify Physician/Vital Signs Parameters Urine output less than 0.5mL/kg/hr (with indwelling catheter) or 30 mL/hr (without indwelling catheter) or blood glucose greater than 200 mg/dL; Standing  - Notify physician; Standing  - Notify Physician - Potential Need of Opioid Reversal; Standing  - Diet NPO; Standing  - Chlorohexidine Gluconate Bath; Standing  - Place in Outpatient; Standing  - Place sequential compression device; Standing  - Comprehensive metabolic panel; Standing  - CBC auto differential; Standing  - Pregnancy, urine rapid; Standing  - Urinalysis, Reflex to Urine Culture; Standing  - Type & Screen Pre Op; Standing        PLAN:  Pap normal    Recommend endometrial sampling to rule out cancer  Patient elects hysteroscope with dilation and curettage in the OR    Explained procedure in detail both in office and  hospital. Explained expectations, treatment. goals, failures, complications. Reviewed premeds and postop care. Precautions for fever, bleeding excessively, and severe pain- pt advised to call immediately or present to ER. Patient's questions answered.     Discussed risks including but not limited to the following: pain, bleeding, infection, uterine perforation with subsequent damage to bowel, bladder or other abdominal or pelvic organs, need for open abdominal incision to repair injuries, hysterectomy. Patient acknowledges risks and desires to proceed with surgery. All questions were entertained and answered.     Consent given, NPO after midnight prior to procedure  Will plan for hysteroscope with dilation and curettage on 9/27/274     RTC Post op     This note was transcribed by Meghna Ayala. There may be transcription errors as a result, however minimal. Effort has been made to ensure accuracy of transcription, but any obvious errors or omissions should be clarified with the author of the document.       I agree with the above documentation.

## 2024-09-24 ENCOUNTER — HOSPITAL ENCOUNTER (OUTPATIENT)
Dept: PREADMISSION TESTING | Facility: HOSPITAL | Age: 54
Discharge: HOME | End: 2024-09-24
Attending: OBSTETRICS & GYNECOLOGY
Payer: MEDICAID

## 2024-09-24 VITALS — HEIGHT: 62 IN | BODY MASS INDEX: 31.97 KG/M2 | WEIGHT: 173.75 LBS

## 2024-09-24 DIAGNOSIS — N95.0 PMB (POSTMENOPAUSAL BLEEDING): ICD-10-CM

## 2024-09-24 DIAGNOSIS — R93.89 THICKENED ENDOMETRIUM: ICD-10-CM

## 2024-09-24 LAB
ALBUMIN SERPL-MCNC: 4.1 G/DL (ref 3.4–5)
ALBUMIN/GLOB SERPL: 1.4 RATIO
ALP SERPL-CCNC: 115 UNIT/L (ref 50–144)
ALT SERPL-CCNC: 43 UNIT/L (ref 1–45)
ANION GAP SERPL CALC-SCNC: 7 MEQ/L (ref 2–13)
AST SERPL-CCNC: 39 UNIT/L (ref 14–36)
BASOPHILS # BLD AUTO: 0.1 X10(3)/MCL (ref 0.01–0.08)
BASOPHILS NFR BLD AUTO: 1.1 % (ref 0.1–1.2)
BILIRUB SERPL-MCNC: 0.2 MG/DL (ref 0–1)
BILIRUB UR QL STRIP.AUTO: NEGATIVE
BUN SERPL-MCNC: 24 MG/DL (ref 7–20)
CALCIUM SERPL-MCNC: 9.6 MG/DL (ref 8.4–10.2)
CHLORIDE SERPL-SCNC: 107 MMOL/L (ref 98–110)
CLARITY UR: CLEAR
CO2 SERPL-SCNC: 30 MMOL/L (ref 21–32)
COLOR UR AUTO: YELLOW
CREAT SERPL-MCNC: 1.46 MG/DL (ref 0.66–1.25)
CREAT/UREA NIT SERPL: 16 (ref 12–20)
EOSINOPHIL # BLD AUTO: 0.24 X10(3)/MCL (ref 0.04–0.36)
EOSINOPHIL NFR BLD AUTO: 2.7 % (ref 0.7–7)
ERYTHROCYTE [DISTWIDTH] IN BLOOD BY AUTOMATED COUNT: 14.3 % (ref 11–14.5)
GFR SERPLBLD CREATININE-BSD FMLA CKD-EPI: 43 ML/MIN/1.73/M2
GLOBULIN SER-MCNC: 2.9 GM/DL (ref 2–3.9)
GLUCOSE SERPL-MCNC: 87 MG/DL (ref 70–115)
GLUCOSE UR QL STRIP: NEGATIVE
HCT VFR BLD AUTO: 41.5 % (ref 36–48)
HGB BLD-MCNC: 13.9 G/DL (ref 11.8–16)
HGB UR QL STRIP: NEGATIVE
IMM GRANULOCYTES # BLD AUTO: 0.02 X10(3)/MCL (ref 0–0.03)
IMM GRANULOCYTES NFR BLD AUTO: 0.2 % (ref 0–0.5)
KETONES UR QL STRIP: NEGATIVE
LEUKOCYTE ESTERASE UR QL STRIP: NEGATIVE
LYMPHOCYTES # BLD AUTO: 4.49 X10(3)/MCL (ref 1.16–3.74)
LYMPHOCYTES NFR BLD AUTO: 50.6 % (ref 20–55)
MCH RBC QN AUTO: 29.8 PG (ref 27–34)
MCHC RBC AUTO-ENTMCNC: 33.5 G/DL (ref 31–37)
MCV RBC AUTO: 88.9 FL (ref 79–99)
MONOCYTES # BLD AUTO: 0.62 X10(3)/MCL (ref 0.24–0.36)
MONOCYTES NFR BLD AUTO: 7 % (ref 4.7–12.5)
NEUTROPHILS # BLD AUTO: 3.4 X10(3)/MCL (ref 1.56–6.13)
NEUTROPHILS NFR BLD AUTO: 38.4 % (ref 37–73)
NITRITE UR QL STRIP: NEGATIVE
NRBC BLD AUTO-RTO: 0 %
PH UR STRIP: 6 [PH]
PLATELET # BLD AUTO: 388 X10(3)/MCL (ref 140–371)
PMV BLD AUTO: 9.3 FL (ref 9.4–12.4)
POTASSIUM SERPL-SCNC: 4 MMOL/L (ref 3.5–5.1)
PROT SERPL-MCNC: 7 GM/DL (ref 6.3–8.2)
PROT UR QL STRIP: NEGATIVE
RBC # BLD AUTO: 4.67 X10(6)/MCL (ref 4–5.1)
SODIUM SERPL-SCNC: 144 MMOL/L (ref 136–145)
SP GR UR STRIP.AUTO: 1.02 (ref 1–1.03)
UROBILINOGEN UR STRIP-ACNC: 0.2
WBC # BLD AUTO: 8.87 X10(3)/MCL (ref 4–11.5)

## 2024-09-24 PROCEDURE — 85025 COMPLETE CBC W/AUTO DIFF WBC: CPT | Performed by: OBSTETRICS & GYNECOLOGY

## 2024-09-24 PROCEDURE — 81003 URINALYSIS AUTO W/O SCOPE: CPT | Performed by: OBSTETRICS & GYNECOLOGY

## 2024-09-24 PROCEDURE — 80053 COMPREHEN METABOLIC PANEL: CPT | Performed by: OBSTETRICS & GYNECOLOGY

## 2024-09-24 NOTE — DISCHARGE INSTRUCTIONS

## 2024-09-26 ENCOUNTER — ANESTHESIA EVENT (OUTPATIENT)
Dept: SURGERY | Facility: HOSPITAL | Age: 54
End: 2024-09-26
Payer: MEDICAID

## 2024-09-26 NOTE — ANESTHESIA PREPROCEDURE EVALUATION
2024  Brie Hidalgo is a 53 y.o., female.  Hypertension COPD (chronic obstructive pulmonary disease)   HLD (hyperlipidemia) Anxiety   Depression GERD (gastroesophageal reflux disease)   Stenosis of cervical spine with myelopathy Gait abnormality   Arm heaviness Insomnia   Migraines Obesity     Surgical History        SECTION CHOLECYSTECTOMY   TONSILLECTOMY COLONOSCOPY   ESOPHAGOGASTRODUODENOSCOPY DECOMPRESSION OF CERVICAL SPINE BY POSTERIOR APPROACH    SECTION, CLASSIC  SECTION, CLASSIC   Cervical Cyrotherapy        Pre-op Assessment    I have reviewed the Patient Summary Reports.     I have reviewed the Nursing Notes. I have reviewed the NPO Status.   I have reviewed the Medications.     Review of Systems  Anesthesia Hx:  No problems with previous Anesthesia             Denies Family Hx of Anesthesia complications.    Denies Personal Hx of Anesthesia complications.                    Social:  Smoker Methadone treatment      Hematology/Oncology:  Hematology Normal   Oncology Normal                                   EENT/Dental:  EENT/Dental Normal           Cardiovascular:  Exercise tolerance: poor   Hypertension           hyperlipidemia   ECG has been reviewed. Summary       ·  Left Ventricle: The left ventricle is normal in size. Normal wall thickness. There is normal systolic function with a visually estimated ejection fraction of 60 - 65%. Grade I diastolic dysfunction.  ·  Right Ventricle: Normal right ventricular cavity size. Systolic function is normal.  ·  No significant valvular dysfunction.                              Pulmonary:   COPD, moderate                     Renal/:  Chronic Renal Disease, CKD                Hepatic/GI:     GERD             Musculoskeletal:     Cervical stenosis with bilat arm weakness  ACDF 3 LEVELS 3 months ago       Spine Disorders:  cervical            Neurological:      Headaches                                 Endocrine:  Endocrine Normal          Obesity / BMI > 30  Dermatological:  Skin Normal    Psych:  Psychiatric History anxiety depression                Physical Exam  General: Well nourished, Cooperative, Alert and Oriented    Airway:  Mallampati: II / II  Mouth Opening: Small, but > 3cm  TM Distance: Normal  Tongue: Normal  Neck ROM: Normal ROM    Dental:  Dentures        Anesthesia Plan  Type of Anesthesia, risks & benefits discussed:    Anesthesia Type: Gen ETT, Gen Supraglottic Airway  Intra-op Monitoring Plan: Standard ASA Monitors  Post Op Pain Control Plan: multimodal analgesia  Induction:  IV  Airway Plan: Direct  Informed Consent: Informed consent signed with the Patient and all parties understand the risks and agree with anesthesia plan.  All questions answered. Patient consented to blood products? Yes  ASA Score: 3  Day of Surgery Review of History & Physical: H&P Update referred to the surgeon/provider.I have interviewed and examined the patient. I have reviewed the patient's H&P dated: There are no significant changes.     Ready For Surgery From Anesthesia Perspective.     .

## 2024-09-27 ENCOUNTER — HOSPITAL ENCOUNTER (OUTPATIENT)
Facility: HOSPITAL | Age: 54
Discharge: HOME | End: 2024-09-27
Attending: OBSTETRICS & GYNECOLOGY | Admitting: OBSTETRICS & GYNECOLOGY
Payer: MEDICAID

## 2024-09-27 ENCOUNTER — ANESTHESIA (OUTPATIENT)
Dept: SURGERY | Facility: HOSPITAL | Age: 54
End: 2024-09-27
Payer: MEDICAID

## 2024-09-27 DIAGNOSIS — R93.89 THICKENED ENDOMETRIUM: ICD-10-CM

## 2024-09-27 DIAGNOSIS — N95.0 PMB (POSTMENOPAUSAL BLEEDING): Primary | ICD-10-CM

## 2024-09-27 LAB
B-HCG UR QL: NEGATIVE
GROUP & RH: NORMAL
INDIRECT COOMBS: NORMAL
SPECIMEN OUTDATE: NORMAL

## 2024-09-27 PROCEDURE — 63600175 PHARM REV CODE 636 W HCPCS: Performed by: OBSTETRICS & GYNECOLOGY

## 2024-09-27 PROCEDURE — C1782 MORCELLATOR: HCPCS | Performed by: OBSTETRICS & GYNECOLOGY

## 2024-09-27 PROCEDURE — 36000706: Performed by: OBSTETRICS & GYNECOLOGY

## 2024-09-27 PROCEDURE — 81025 URINE PREGNANCY TEST: CPT | Performed by: OBSTETRICS & GYNECOLOGY

## 2024-09-27 PROCEDURE — 58558 HYSTEROSCOPY BIOPSY: CPT | Mod: ,,, | Performed by: OBSTETRICS & GYNECOLOGY

## 2024-09-27 PROCEDURE — 37000009 HC ANESTHESIA EA ADD 15 MINS: Performed by: OBSTETRICS & GYNECOLOGY

## 2024-09-27 PROCEDURE — 71000033 HC RECOVERY, INTIAL HOUR: Performed by: OBSTETRICS & GYNECOLOGY

## 2024-09-27 PROCEDURE — 37000008 HC ANESTHESIA 1ST 15 MINUTES: Performed by: OBSTETRICS & GYNECOLOGY

## 2024-09-27 PROCEDURE — 86850 RBC ANTIBODY SCREEN: CPT | Performed by: OBSTETRICS & GYNECOLOGY

## 2024-09-27 PROCEDURE — 86900 BLOOD TYPING SEROLOGIC ABO: CPT | Performed by: OBSTETRICS & GYNECOLOGY

## 2024-09-27 PROCEDURE — 25000003 PHARM REV CODE 250: Performed by: NURSE ANESTHETIST, CERTIFIED REGISTERED

## 2024-09-27 PROCEDURE — 25000003 PHARM REV CODE 250: Performed by: OBSTETRICS & GYNECOLOGY

## 2024-09-27 PROCEDURE — 71000015 HC POSTOP RECOV 1ST HR: Performed by: OBSTETRICS & GYNECOLOGY

## 2024-09-27 PROCEDURE — 63600175 PHARM REV CODE 636 W HCPCS: Mod: JZ,JG | Performed by: OBSTETRICS & GYNECOLOGY

## 2024-09-27 PROCEDURE — 63600175 PHARM REV CODE 636 W HCPCS: Performed by: NURSE ANESTHETIST, CERTIFIED REGISTERED

## 2024-09-27 PROCEDURE — 36415 COLL VENOUS BLD VENIPUNCTURE: CPT | Performed by: OBSTETRICS & GYNECOLOGY

## 2024-09-27 PROCEDURE — 36000707: Performed by: OBSTETRICS & GYNECOLOGY

## 2024-09-27 RX ORDER — LIDOCAINE HYDROCHLORIDE 20 MG/ML
INJECTION INTRAVENOUS
Status: DISCONTINUED | OUTPATIENT
Start: 2024-09-27 | End: 2024-09-27

## 2024-09-27 RX ORDER — FAMOTIDINE 20 MG/1
20 TABLET, FILM COATED ORAL
Status: DISCONTINUED | OUTPATIENT
Start: 2024-09-27 | End: 2024-09-27 | Stop reason: HOSPADM

## 2024-09-27 RX ORDER — MUPIROCIN 20 MG/G
OINTMENT TOPICAL
Status: DISCONTINUED | OUTPATIENT
Start: 2024-09-27 | End: 2024-09-27 | Stop reason: HOSPADM

## 2024-09-27 RX ORDER — FENTANYL CITRATE 50 UG/ML
INJECTION, SOLUTION INTRAMUSCULAR; INTRAVENOUS
Status: DISCONTINUED | OUTPATIENT
Start: 2024-09-27 | End: 2024-09-27

## 2024-09-27 RX ORDER — ACETAMINOPHEN 10 MG/ML
1000 INJECTION, SOLUTION INTRAVENOUS ONCE
Status: COMPLETED | OUTPATIENT
Start: 2024-09-27 | End: 2024-09-27

## 2024-09-27 RX ORDER — SODIUM CHLORIDE 9 MG/ML
INJECTION, SOLUTION INTRAVENOUS CONTINUOUS
Status: DISCONTINUED | OUTPATIENT
Start: 2024-09-27 | End: 2024-09-27 | Stop reason: HOSPADM

## 2024-09-27 RX ORDER — FAMOTIDINE 20 MG/1
20 TABLET, FILM COATED ORAL
Status: COMPLETED | OUTPATIENT
Start: 2024-09-27 | End: 2024-09-27

## 2024-09-27 RX ORDER — MIDAZOLAM HYDROCHLORIDE 1 MG/ML
2 INJECTION INTRAMUSCULAR; INTRAVENOUS
Status: COMPLETED | OUTPATIENT
Start: 2024-09-27 | End: 2024-09-27

## 2024-09-27 RX ORDER — DIPHENHYDRAMINE HYDROCHLORIDE 50 MG/ML
25 INJECTION INTRAMUSCULAR; INTRAVENOUS EVERY 4 HOURS PRN
Status: DISCONTINUED | OUTPATIENT
Start: 2024-09-27 | End: 2024-09-27 | Stop reason: HOSPADM

## 2024-09-27 RX ORDER — GLYCOPYRROLATE 0.2 MG/ML
0.2 INJECTION INTRAMUSCULAR; INTRAVENOUS
Status: COMPLETED | OUTPATIENT
Start: 2024-09-27 | End: 2024-09-27

## 2024-09-27 RX ORDER — CARVEDILOL 25 MG/1
25 TABLET ORAL ONCE
Status: COMPLETED | OUTPATIENT
Start: 2024-09-27 | End: 2024-09-27

## 2024-09-27 RX ORDER — DIPHENHYDRAMINE HCL 25 MG
25 CAPSULE ORAL EVERY 4 HOURS PRN
Status: DISCONTINUED | OUTPATIENT
Start: 2024-09-27 | End: 2024-09-27 | Stop reason: HOSPADM

## 2024-09-27 RX ORDER — ONDANSETRON HYDROCHLORIDE 2 MG/ML
INJECTION, SOLUTION INTRAVENOUS
Status: DISCONTINUED | OUTPATIENT
Start: 2024-09-27 | End: 2024-09-27

## 2024-09-27 RX ORDER — IBUPROFEN 600 MG/1
600 TABLET ORAL EVERY 6 HOURS PRN
Status: DISCONTINUED | OUTPATIENT
Start: 2024-09-27 | End: 2024-09-27 | Stop reason: HOSPADM

## 2024-09-27 RX ORDER — IBUPROFEN 600 MG/1
600 TABLET ORAL EVERY 6 HOURS PRN
Qty: 120 TABLET | Refills: 2 | Status: SHIPPED | OUTPATIENT
Start: 2024-09-27

## 2024-09-27 RX ORDER — KETOROLAC TROMETHAMINE 30 MG/ML
INJECTION, SOLUTION INTRAMUSCULAR; INTRAVENOUS
Status: DISCONTINUED | OUTPATIENT
Start: 2024-09-27 | End: 2024-09-27

## 2024-09-27 RX ORDER — ONDANSETRON 4 MG/1
8 TABLET, ORALLY DISINTEGRATING ORAL EVERY 8 HOURS PRN
Status: DISCONTINUED | OUTPATIENT
Start: 2024-09-27 | End: 2024-09-27 | Stop reason: HOSPADM

## 2024-09-27 RX ORDER — PROPOFOL 10 MG/ML
INJECTION, EMULSION INTRAVENOUS
Status: DISCONTINUED | OUTPATIENT
Start: 2024-09-27 | End: 2024-09-27

## 2024-09-27 RX ADMIN — KETOROLAC TROMETHAMINE 30 MG: 30 INJECTION, SOLUTION INTRAMUSCULAR; INTRAVENOUS at 07:09

## 2024-09-27 RX ADMIN — FENTANYL CITRATE 50 MCG: 50 INJECTION, SOLUTION INTRAMUSCULAR; INTRAVENOUS at 07:09

## 2024-09-27 RX ADMIN — MIDAZOLAM HYDROCHLORIDE 2 MG: 1 INJECTION, SOLUTION INTRAMUSCULAR; INTRAVENOUS at 06:09

## 2024-09-27 RX ADMIN — ONDANSETRON 4 MG: 2 INJECTION INTRAMUSCULAR; INTRAVENOUS at 07:09

## 2024-09-27 RX ADMIN — PROPOFOL 120 MG: 10 INJECTION, EMULSION INTRAVENOUS at 07:09

## 2024-09-27 RX ADMIN — MUPIROCIN: 20 OINTMENT TOPICAL at 06:09

## 2024-09-27 RX ADMIN — CEFAZOLIN 2 G: 1 INJECTION, POWDER, FOR SOLUTION INTRAMUSCULAR; INTRAVENOUS at 07:09

## 2024-09-27 RX ADMIN — ACETAMINOPHEN 1000 MG: 10 INJECTION INTRAVENOUS at 08:09

## 2024-09-27 RX ADMIN — LIDOCAINE HYDROCHLORIDE 50 MG: 20 INJECTION, SOLUTION INTRAVENOUS at 07:09

## 2024-09-27 RX ADMIN — GLYCOPYRROLATE 0.2 MG: 0.2 INJECTION INTRAMUSCULAR; INTRAVENOUS at 06:09

## 2024-09-27 RX ADMIN — CARVEDILOL 25 MG: 25 TABLET, FILM COATED ORAL at 06:09

## 2024-09-27 RX ADMIN — SODIUM CHLORIDE: 9 INJECTION, SOLUTION INTRAVENOUS at 06:09

## 2024-09-27 RX ADMIN — FAMOTIDINE 20 MG: 20 TABLET, FILM COATED ORAL at 06:09

## 2024-09-27 RX ADMIN — IBUPROFEN 600 MG: 600 TABLET, FILM COATED ORAL at 08:09

## 2024-09-27 NOTE — BRIEF OP NOTE
[facility]  [facility]    Surgery Date: 9/27/2024     Surgeons and Role:     * Brady Rosario MD - Primary    Assisting Surgeon: None    Pre-op Diagnosis:  PMB (postmenopausal bleeding) [N95.0]  Thickened endometrium [R93.89]    Post-op Diagnosis:  Post-Op Diagnosis Codes:     * PMB (postmenopausal bleeding) [N95.0]     * Thickened endometrium [R93.89]    Procedure(s) (LRB):  HYSTEROSCOPY, WITH DILATION AND CURETTAGE OF UTERUS (N/A)    Anesthesia: General    Operative Findings: see op note    Estimated Blood Loss: * No values recorded between 9/27/2024  7:32 AM and 9/27/2024  7:46 AM *         Specimens:   Specimen (24h ago, onward)       Start     Ordered    09/27/24 0743  Specimen to Pathology Gynecology and Obstetrics  Once        Comments: 1. [facility]2. [facility]     References:    Click here for ordering Quick Tip   Question Answer Comment   Procedure Type: Gynecology and Obstetrics    Specimen Source Endometrium    Specimen Source Endocervix    Procedure Type: Biopsy    Clinical Information: postmenopausal bleeding    Release to patient Immediate        09/27/24 0743 09/27/24 0742  Specimen to Pathology  RELEASE UPON ORDERING        References:    Click here for ordering Quick Tip   Question:  Release to patient  Answer:  Immediate    09/27/24 0742                      Discharge Note    OUTCOME: Patient tolerated treatment/procedure well without complication and is now ready for discharge.    DISPOSITION: Home or Self Care    FINAL DIAGNOSIS:  PMB (postmenopausal bleeding)    FOLLOWUP: In clinic    DISCHARGE INSTRUCTIONS:    Discharge Procedure Orders   Diet general     Call MD for:  temperature >100.4     Call MD for:  persistent nausea and vomiting     Call MD for:  severe uncontrolled pain     Call MD for:  difficulty breathing, headache or visual disturbances     Call MD for:  redness, tenderness, or signs of infection (pain, swelling, redness, odor or green/yellow discharge  around incision site)     Call MD for:  hives     Call MD for:  persistent dizziness or light-headedness     Call MD for:  extreme fatigue

## 2024-09-27 NOTE — ANESTHESIA POSTPROCEDURE EVALUATION
Anesthesia Post Evaluation    Patient: Brie Hidalgo    Procedure(s) Performed: Procedure(s) (LRB):  HYSTEROSCOPY, WITH DILATION AND CURETTAGE OF UTERUS (N/A)    Final Anesthesia Type: general      Patient location during evaluation: PACU  Patient participation: Yes- Able to Participate  Level of consciousness: awake and alert, awake and oriented  Post-procedure vital signs: reviewed and stable  Pain management: adequate  Airway patency: patent    PONV status at discharge: No PONV  Anesthetic complications: no      Cardiovascular status: blood pressure returned to baseline  Respiratory status: unassisted, room air and spontaneous ventilation  Hydration status: euvolemic  Follow-up not needed.              Vitals Value Taken Time   /68 09/27/24 0905   Temp 36.4 °C (97.5 °F) 09/27/24 0844   Pulse 54 09/27/24 0905   Resp 20 09/27/24 0905   SpO2 97 % 09/27/24 0905         Event Time   Out of Recovery 08:14:00         Pain/Guido Score: Pain Rating Prior to Med Admin: 8 (9/27/2024  8:44 AM)  Guido Score: 10 (9/27/2024  8:13 AM)

## 2024-09-27 NOTE — ANESTHESIA PROCEDURE NOTES
Intubation    Date/Time: 9/27/2024 7:14 AM    Performed by: Shahram Hudson CRNA  Authorized by: Shahram Hudson CRNA    Intubation:     Induction:  Intravenous    Intubated:  Postinduction    Mask Ventilation:  Easy mask    Attempts:  1    Attempted By:  CRNA    Method of Intubation:  Direct    Difficult Airway Encountered?: No      Complications:  None    Airway Device:  Supraglottic airway/LMA    Airway Device Size:  3.0    Style/Cuff Inflation:  Cuffed    Secured at:  The lips    Placement Verified By:  Capnometry    Complicating Factors:  None    Findings Post-Intubation:  BS equal bilateral and atraumatic/condition of teeth unchanged

## 2024-09-27 NOTE — DISCHARGE INSTRUCTIONS
Follow-up with Dr. Rosario as scheduled.    Diet as tolerated.    Decrease your activity today and until after your appointment,   walk to prevent blood clots.    No heavy lifting or straining, no pushing or pulling. (10# limit)    No driving today, or while taking pain medication.    No intercourse, douching, or tampons.    Showers only, no tub baths.    Notify MD of:  Fever over 100.4  Excessive nausea/vomiting  Excessive vaginal bleeding, saturating a pad/hr.  Pain unrelieved by your pain medication  Drainage of yellow/green from sites/vagina    Mupirocin ointment - apply this evening in both nostrils and twice a day the next 2 days.

## 2024-09-27 NOTE — PLAN OF CARE
Up to the bathroom, urinated, tolerated well. Getting dressed to go home, new pad placed. Pain a #9, will give ibuprofen.

## 2024-09-27 NOTE — OP NOTE
DATE OF PROCEDURE: 9/27/2024    PRE-OP DIAGNOSIS:  PMB (postmenopausal bleeding) [N95.0]  Thickened endometrium [R93.89]    POST-OP DIAGNOSIS:  Post-Op Diagnosis Codes:     * PMB (postmenopausal bleeding) [N95.0]     * Thickened endometrium [R93.89]    Procedure:   Hysteroscope Dilation and Curettage    Surgeon: Brady Rosario MD    Anesthesia: General    Estimated Blood Loss:  0 cc    Findings:   Fairly stenotic upper vagina.  Cervix small, nulliparous, poorly mobile fixed in upper vagina.  Uterus sounded to 7 cm.  Thin appearing endometrium with small adhesions in deep fundus.    Procedure:   After consent were reviewed, the patient was taken to the operative room and placed on the OR table.  A time out  was held and after general anesthesia was induced.    The patient was placed in the dorsal lithotomy position and prepped and draped in the standard sterile fashion.  A weighted speculum was placed in the posterior vagina.  The cervix was identified and grasped with a single tooth tenaculum.  The uterus was sounded to 7 cm.  The cervix was then dilated to allow passage of a rigid hysteroscope.  The scope was inserted.  Thin appearing endometrium with small adhesions in deep funds were noted.  Due to location of the adhesions, a MyoSure reach device was used to sample the adhesions and the remainder of the endometrial cavity.  The scope was removed.  An endocervical curetting was performed and handed off separately.    At that point, the procedure was terminated.  All instruments were removed.  Counts were correct.  Patient was awakened and taken to the recovery room in stable condition having tolerated the procedure well.     I agree with anesthesia's plan of care.

## 2024-09-27 NOTE — PLAN OF CARE
Returned from PACU per stretcher, awake and alert, wanting to urinate - refused the bedpan. Suggested to the patient that she should wait a few more minutes before getting up safely. Pad in place. IV patent and site clear, infusing well. SCDs on and working well. Family at bedside.

## 2024-09-30 VITALS
SYSTOLIC BLOOD PRESSURE: 131 MMHG | RESPIRATION RATE: 20 BRPM | HEART RATE: 54 BPM | OXYGEN SATURATION: 97 % | TEMPERATURE: 98 F | HEIGHT: 62 IN | BODY MASS INDEX: 31.97 KG/M2 | WEIGHT: 173.75 LBS | DIASTOLIC BLOOD PRESSURE: 68 MMHG

## 2024-10-03 LAB — BEAKER SEE SCANNED REPORT: NORMAL

## 2024-10-24 ENCOUNTER — OFFICE VISIT (OUTPATIENT)
Dept: OBSTETRICS AND GYNECOLOGY | Facility: CLINIC | Age: 54
End: 2024-10-24
Payer: MEDICAID

## 2024-10-24 VITALS — DIASTOLIC BLOOD PRESSURE: 84 MMHG | WEIGHT: 170 LBS | SYSTOLIC BLOOD PRESSURE: 132 MMHG | BODY MASS INDEX: 31.09 KG/M2

## 2024-10-24 DIAGNOSIS — Z09 POSTOP CHECK: Primary | ICD-10-CM

## 2024-10-24 DIAGNOSIS — Z12.39 ENCOUNTER FOR SCREENING FOR MALIGNANT NEOPLASM OF BREAST, UNSPECIFIED SCREENING MODALITY: ICD-10-CM

## 2024-10-24 PROCEDURE — 3075F SYST BP GE 130 - 139MM HG: CPT | Mod: CPTII,,, | Performed by: OBSTETRICS & GYNECOLOGY

## 2024-10-24 PROCEDURE — 3079F DIAST BP 80-89 MM HG: CPT | Mod: CPTII,,, | Performed by: OBSTETRICS & GYNECOLOGY

## 2024-10-24 PROCEDURE — 99024 POSTOP FOLLOW-UP VISIT: CPT | Mod: ,,, | Performed by: OBSTETRICS & GYNECOLOGY

## 2024-10-24 PROCEDURE — 1159F MED LIST DOCD IN RCRD: CPT | Mod: CPTII,,, | Performed by: OBSTETRICS & GYNECOLOGY

## 2024-10-24 NOTE — PROGRESS NOTES
HPI:   53 y.o. F s/p  hysteroscope with dilation and curettage on 9/27/2024 due to PMB, thickened Endometrium here for postop visit. Doing well.  She requests mammogram order.    Pathology:   Endometrium curettage:  Benign polypoid endometrium and smooth muscle,    The benign smooth muscle proliferation may represent benign myometrial tissue, adenomyosis, or leiomyomata.  No malignancy identified   Endocervix curettage:  Fragments of benign endocervical glandular epithelium and stroma   Benign detached squamous epithelium   No malignancy identified    Gyn History:    Menstrual History  Cycle: No  Menarche Age: 1 years    Menopause  Menopause Age: 43 years  Post Menopausal Bleeding: No  Hormone Replacement Therapy: No    Pap History  Last pap date: 08/29/24  Result: Normal  History of Abnormal Pap: (!) Yes  Treatment used:  (Cryo)  HPV Vaccine Completed: No    [place]  Sexually Active: Yes  Sexual Orientation: heterosexual  Postcoital Bleeding: No  Dyspareunia: No  STI History: No  Contraception: No    Breast History  Last Breast Imaging Date: Yes  Date: 08/07/23  History of Abnormal Breast Imaging : (!) Yes (DX MMG WNL)  History of Breast Biopsy: No        Physical Exam:   /84   Wt 77.1 kg (170 lb)   BMI 31.09 kg/m²   Gen: NAD  Abd: Soft, NT.      Assessment:   1. Postop check    2. Encounter for screening for malignant neoplasm of breast, unspecified screening modality  - Mammo Digital Screening Bilat w/ Edward; Future    Hysteroscopy, With Dilation And Curettage Of Uterus       Plan:   Reviewed pathology. benign  May return to normal activity    Monitor bleeding was may require progestin therapy or hysterectomy.      MMG order sent     RTC 6 months for f/u     This note was transcribed by Meghna Ayala. There may be transcription errors as a result, however minimal. Effort has been made to ensure accuracy of transcription, but any obvious errors or omissions should be clarified with the author of the  document.       I agree with the above documentation.

## 2024-12-17 ENCOUNTER — PATIENT MESSAGE (OUTPATIENT)
Dept: NEUROSURGERY | Facility: CLINIC | Age: 54
End: 2024-12-17
Payer: MEDICAID

## 2024-12-17 ENCOUNTER — TELEPHONE (OUTPATIENT)
Dept: NEUROSURGERY | Facility: CLINIC | Age: 54
End: 2024-12-17
Payer: MEDICAID

## 2024-12-17 DIAGNOSIS — M48.02 STENOSIS OF CERVICAL SPINE WITH MYELOPATHY: Primary | ICD-10-CM

## 2024-12-17 DIAGNOSIS — G99.2 STENOSIS OF CERVICAL SPINE WITH MYELOPATHY: Primary | ICD-10-CM

## 2024-12-17 NOTE — TELEPHONE ENCOUNTER
Please schedule the patient sooner rather than later on either my or Linnea's schedule. Please have the patient complete AP/Lat/Flex/Ext views of the cervical spine prior to the visit.  I have entered the orders. Thanks

## 2024-12-17 NOTE — TELEPHONE ENCOUNTER
I tried to call the patient x2 to get her appointment scheduled per Sydni's request. Her phone does not ring, it just says there is no voicemail available at this time. I will try to send her a portal message as I am not able to get through.

## 2024-12-19 NOTE — TELEPHONE ENCOUNTER
I tried to call the patient again to get her scheduled. The call is still not going through. I already sent a patient portal message and now will mail a letter.

## 2024-12-26 ENCOUNTER — TELEPHONE (OUTPATIENT)
Dept: NEUROSURGERY | Facility: CLINIC | Age: 54
End: 2024-12-26
Payer: MEDICAID

## 2024-12-26 NOTE — TELEPHONE ENCOUNTER
I returned the patients call. She is scheduled to see Sydni on 12/30/24 at 10:00, XR at [facility] for 9:00. She was compliant w date and time.

## 2024-12-27 RX ORDER — CIPROFLOXACIN 500 MG/1
500 TABLET ORAL 2 TIMES DAILY
COMMUNITY
Start: 2024-12-23

## 2024-12-27 RX ORDER — IBUPROFEN 800 MG/1
800 TABLET ORAL 2 TIMES DAILY PRN
COMMUNITY
Start: 2024-12-18 | End: 2024-12-30 | Stop reason: SDUPTHER

## 2024-12-30 ENCOUNTER — OFFICE VISIT (OUTPATIENT)
Dept: NEUROSURGERY | Facility: CLINIC | Age: 54
End: 2024-12-30
Payer: MEDICAID

## 2024-12-30 ENCOUNTER — HOSPITAL ENCOUNTER (OUTPATIENT)
Dept: RADIOLOGY | Facility: HOSPITAL | Age: 54
Discharge: HOME | End: 2024-12-30
Attending: NURSE PRACTITIONER
Payer: MEDICAID

## 2024-12-30 VITALS
RESPIRATION RATE: 16 BRPM | WEIGHT: 166 LBS | BODY MASS INDEX: 28.34 KG/M2 | HEIGHT: 64 IN | DIASTOLIC BLOOD PRESSURE: 72 MMHG | SYSTOLIC BLOOD PRESSURE: 111 MMHG | HEART RATE: 71 BPM

## 2024-12-30 DIAGNOSIS — M48.02 STENOSIS OF CERVICAL SPINE WITH MYELOPATHY: ICD-10-CM

## 2024-12-30 DIAGNOSIS — G99.2 STENOSIS OF CERVICAL SPINE WITH MYELOPATHY: ICD-10-CM

## 2024-12-30 DIAGNOSIS — M47.12 CERVICAL SPONDYLOSIS WITH MYELOPATHY: ICD-10-CM

## 2024-12-30 DIAGNOSIS — M62.838 MUSCLE SPASTICITY: Primary | ICD-10-CM

## 2024-12-30 PROCEDURE — 99215 OFFICE O/P EST HI 40 MIN: CPT | Mod: ,,, | Performed by: NURSE PRACTITIONER

## 2024-12-30 PROCEDURE — 3008F BODY MASS INDEX DOCD: CPT | Mod: CPTII,,, | Performed by: NURSE PRACTITIONER

## 2024-12-30 PROCEDURE — 3074F SYST BP LT 130 MM HG: CPT | Mod: CPTII,,, | Performed by: NURSE PRACTITIONER

## 2024-12-30 PROCEDURE — 1160F RVW MEDS BY RX/DR IN RCRD: CPT | Mod: CPTII,,, | Performed by: NURSE PRACTITIONER

## 2024-12-30 PROCEDURE — 72052 X-RAY EXAM NECK SPINE 6/>VWS: CPT | Mod: TC

## 2024-12-30 PROCEDURE — 3078F DIAST BP <80 MM HG: CPT | Mod: CPTII,,, | Performed by: NURSE PRACTITIONER

## 2024-12-30 PROCEDURE — 1159F MED LIST DOCD IN RCRD: CPT | Mod: CPTII,,, | Performed by: NURSE PRACTITIONER

## 2024-12-30 RX ORDER — IBUPROFEN 800 MG/1
800 TABLET ORAL 2 TIMES DAILY PRN
Qty: 60 TABLET | Refills: 0 | Status: SHIPPED | OUTPATIENT
Start: 2024-12-30

## 2024-12-30 NOTE — PROGRESS NOTES
[facility]  History & Physical  Neurosurgery      Brie Hidalgo   [id number]   1970     SUBJECTIVE:     CHIEF COMPLAINT:  3 month Post-op visit    HPI:  Brie Hidalgo is a 54 y.o. female who presents for a 3 month post-operative follow-up.  She is s/p C3-4, C4-5 and C5-6 decompressive laminectomies by Dr. Harding on 5/13/2024.     Prior to surgical intervention the patient reported 2-3 months of heaviness into the bilateral upper extremities with pain across the chest and difficulty walking.  She was having frequent falls at that time.  She was last seen in clinic for a three-month postoperative visit at which time she continued to struggle with posterior neck pain as well as right-greater-than-left trapezius and paraspinal spasming.  She reported near resolution of her previous bilateral upper extremity symptoms.  She continued in pain management for chronic pain which was being treated with methadone.    The patient returns today reporting symptoms consistent to when she was last seen.  She continues to describe severe posterior neck spasming radiating into the bilateral trapezius region. She reports she will occasionally have a loud popping from her neck that can be very painful as well.  She is denying any upper extremity radicular symptoms.  She was rating her pain an 8/10 today.  The patient denies disturbances in bowel or bladder function.  There is no difficulty with balance.  She does find some relief with utilization of ibuprofen 800 mg couple of times a day.  Her symptoms can occasionally affect her.      Past Medical History:   Diagnosis Date    Anxiety     Arm heaviness     COPD (chronic obstructive pulmonary disease)     Depression     Gait abnormality     GERD (gastroesophageal reflux disease)     HLD (hyperlipidemia)     Hypertension     Insomnia     Migraines     Obesity     Stenosis of cervical spine with myelopathy        Past Surgical History:   Procedure Laterality Date     Cervical Cyrotherapy       SECTION       SECTION, CLASSIC       SECTION, CLASSIC      CHOLECYSTECTOMY      COLONOSCOPY      DECOMPRESSION OF CERVICAL SPINE BY POSTERIOR APPROACH N/A 2024    C3-4, C4-5, C5-6.  Dr. Harding    ESOPHAGOGASTRODUODENOSCOPY      HYSTEROSCOPY WITH DILATION AND CURETTAGE OF UTERUS N/A 2024    Procedure: HYSTEROSCOPY, WITH DILATION AND CURETTAGE OF UTERUS;  Surgeon: Brady Rosario MD;  Location: [facility] OR;  Service: OB/GYN;  Laterality: N/A;    TONSILLECTOMY         Family History   Problem Relation Name Age of Onset    Cancer Father      Heart disease Father      Heart disease Mother      Kidney disease Sister      Breast cancer Neg Hx      Colon cancer Neg Hx      Uterine cancer Neg Hx      Cervical cancer Neg Hx      Ovarian cancer Neg Hx         Social History     Socioeconomic History    Marital status:    Tobacco Use    Smoking status: Former     Current packs/day: 0.00     Types: Cigarettes     Quit date: 2024     Years since quittin.6    Smokeless tobacco: Never   Substance and Sexual Activity    Alcohol use: Never    Drug use: Never    Sexual activity: Yes     Partners: Male     Birth control/protection: Post-menopausal     Social Drivers of Health     Financial Resource Strain: Low Risk  (2024)    Overall Financial Resource Strain (CARDIA)     Difficulty of Paying Living Expenses: Not hard at all   Food Insecurity: No Food Insecurity (2024)    Hunger Vital Sign     Worried About Running Out of Food in the Last Year: Never true     Ran Out of Food in the Last Year: Never true   Transportation Needs: No Transportation Needs (2024)    TRANSPORTATION NEEDS     Transportation : No   Stress: No Stress Concern Present (2024)    [employer] - Occupational Stress Questionnaire     Feeling of Stress : Not at all   Housing Stability: Low Risk  (2024)    Housing Stability Vital Sign     Unable to  Pay for Housing in the Last Year: No     Homeless in the Last Year: No       Review of patient's allergies indicates:   Allergen Reactions    Codeine Rash        Current Outpatient Medications   Medication Instructions    albuterol (PROVENTIL/VENTOLIN HFA) 90 mcg/actuation inhaler 2 puffs, Every 4 hours PRN    atorvastatin (LIPITOR) 40 mg, Nightly    carvediloL (COREG) 25 mg, 2 times daily    ciprofloxacin HCl (CIPRO) 500 mg, 2 times daily    ergocalciferol (ERGOCALCIFEROL) 50,000 Units, Three times weekly    hydrALAZINE (APRESOLINE) 25 mg, 3 times daily    hydroCHLOROthiazide (MICROZIDE) 12.5 mg, Every morning    hydrOXYzine pamoate (VISTARIL) 25 mg, 2 times daily    ibuprofen (ADVIL,MOTRIN) 800 mg, Oral, 2 times daily PRN    methadone (DOLOPHINE) 95 mg, Oral, Daily    omeprazole (PRILOSEC) 40 mg, Every morning    ondansetron (ZOFRAN-ODT) 4 mg    pregabalin (LYRICA) 100 mg, Oral, 2 times daily    promethazine (PHENERGAN) 25 mg, 3 times daily PRN    QUEtiapine (SEROQUEL) 50 mg, Nightly    sertraline (ZOLOFT) 100 mg, Nightly    sumatriptan (IMITREX) 100 mg, Daily PRN          Review of Systems   Constitutional:  Negative for chills, fever and weight loss.   HENT:  Negative for congestion, hearing loss, nosebleeds and tinnitus.    Eyes:  Negative for blurred vision, double vision and photophobia.   Respiratory:  Negative for cough, shortness of breath and wheezing.    Cardiovascular:  Negative for chest pain, palpitations and leg swelling.   Gastrointestinal:  Negative for constipation, diarrhea, nausea and vomiting.   Genitourinary:  Negative for dysuria, frequency and urgency.   Musculoskeletal:  Positive for neck pain. Negative for back pain and falls.   Skin:  Negative for itching and rash.   Neurological:  Negative for dizziness, tingling, tremors, sensory change, speech change, seizures, loss of consciousness and headaches.   Psychiatric/Behavioral:  Negative for depression, hallucinations and memory loss. The  "patient is not nervous/anxious.          OBJECTIVE:     Physical Exam    Visit Vitals  /72 (BP Location: Left arm, Patient Position: Sitting)   Pulse 71   Resp 16   Ht 5' 4" (1.626 m)   Wt 75.3 kg (166 lb)   BMI 28.49 kg/m²          General:  Pleasant, Well-nourished, Well-groomed.    Cardiovascular:  Heart has regular rate and rhythm.    Lungs:  Breathing is quiet, non-lablored    Musculoskeletal:  Incision:  Well healed, no significant drainage, no dehiscence, no significant erythema.  ROM is Decreased secondary to pain  Multiple palpable trigger points throughout the posterior neck, rhomboids and superior trapezius region.    Neurological:  Muscle strength against resistance:   Right Left   Deltoid (C5) 5/5 5/5   Biceps (C5/6) 5/5 5/5   Brachioradialis (C5/6) 5/5 5/5   Triceps (C7) 5/5 5/5   Wrist extension (C7) 5/5 5/5   Finger abduction (C8) 5/5 5/5    5/5 5/5   Sensation is intact to primary modalities in bilateral upper and lower extremities.    Reflexes:   Right Left   Triceps (C7) 2+ 2+   Biceps (C5) 2+ 2+   Brachioradialis (C6) 2+ 2+   Patellar (L4) 3+ 3+   Achilles (S1) 2+ 2+   Positive Estes's: Bilateral  Gait is normal  Coordination is normal.    Imaging:  All pertinent neuroimaging independently reviewed. Discussed these findings in detail with the patient.    X-rays of the cervical spine dated 7/23/2024 reveals chronic diffuse degenerative changes throughout the cervical spine with interval C3 through C6 laminectomies compared to previous imaging dated 4/27/2024.    Updated x-rays of the cervical spine reveal slight straightening of normal cervical lordosis from C2-C5 with no abnormal motion on extension or flexion views.    ASSESSMENT:       ICD-10-CM ICD-9-CM   1. Muscle spasticity  M62.838 728.85   2. Cervical spondylosis with myelopathy  M47.12 721.1     PLAN:     1. Muscle spasticity (Primary)  - Ambulatory referral/consult to Pain Clinic; Future  - ibuprofen (ADVIL,MOTRIN) 800 MG " tablet; Take 1 tablet (800 mg total) by mouth 2 (two) times daily as needed for Pain.  Dispense: 60 tablet; Refill: 0    2. Cervical spondylosis with myelopathy  - Ambulatory referral/consult to Pain Clinic; Future  - ibuprofen (ADVIL,MOTRIN) 800 MG tablet; Take 1 tablet (800 mg total) by mouth 2 (two) times daily as needed for Pain.  Dispense: 60 tablet; Refill: 0      Brie Hidalgo returns today reporting continued spasticity and tightness into the posterior neck trapezius region.  I did take the time to review the patient's updated x-rays as well as her previous x-rays and preoperative MRI with she and her significant other in clinic today.  I feel it her nerves are likely still trying to heal which is causing some muscle spasticity as well.  At this time I am recommending the patient move forward with consultation regarding possible trigger point injections versus epidural steroid injections.  We also discussed beginning home over the door cervical traction.  She was provided information on this device today.  She verbalizes understanding at this time.  We will plan to see the patient back in clinic on an as-needed basis going forward.  She was advised to notify us with any regression in his symptoms or return of radicular pain.    Follow up if symptoms worsen or fail to improve.    E/M Level Based On Time:   15 minutes spent on reviewing chart, which includes interpreting lab results and diagnostic tests.   25 minutes spent with the patient performing a history and physical exam, counseling or educating the patient/caregiver, prescribing medications, ordering labwork/diagnostic tests, or placing referrals.   0 minutes spent collaborating plan of care with physician.   5 minutes spent documenting all relevant clinical informationin the electronic health record.     Total Time Spent: 45 minutes       RUY Whelan    Disclaimer:  This note is prepared using voice recognition software and as such is  likely to have errors despite attempts at proofreading. Please contact me for questions.

## 2025-02-06 ENCOUNTER — HOSPITAL ENCOUNTER (EMERGENCY)
Facility: HOSPITAL | Age: 55
Discharge: HOME | End: 2025-02-06
Attending: INTERNAL MEDICINE
Payer: MEDICAID

## 2025-02-06 VITALS
BODY MASS INDEX: 27.83 KG/M2 | OXYGEN SATURATION: 98 % | HEART RATE: 69 BPM | TEMPERATURE: 98 F | HEIGHT: 64 IN | SYSTOLIC BLOOD PRESSURE: 132 MMHG | RESPIRATION RATE: 16 BRPM | DIASTOLIC BLOOD PRESSURE: 79 MMHG | WEIGHT: 163 LBS
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
## 2025-02-06 DIAGNOSIS — M25.561 ACUTE PAIN OF RIGHT KNEE: Primary | ICD-10-CM

## 2025-02-06 PROCEDURE — 99284 EMERGENCY DEPT VISIT MOD MDM: CPT | Mod: 25

## 2025-02-06 RX ORDER — DICLOFENAC SODIUM 10 MG/G
2 GEL TOPICAL 4 TIMES DAILY
Qty: 50 G | Refills: 0 | Status: SHIPPED | OUTPATIENT
Start: 2025-02-06

## 2025-02-06 RX ORDER — TRAMADOL HYDROCHLORIDE 50 MG/1
50 TABLET ORAL EVERY 12 HOURS PRN
Qty: 6 TABLET | Refills: 0 | Status: SHIPPED | OUTPATIENT
Start: 2025-02-06 | End: 2025-02-09

## 2025-02-06 NOTE — DISCHARGE INSTRUCTIONS
Rapid knee with Ace wrap, elevate, rest, ice.  May take your ibuprofen 800 mg every 8 hours as needed for pain.  May apply diclofenac topical to the knee as well for pain.  May take tramadol as needed for more severe pain, do not taken drive.  Follow-up with your primary care provider as needed in 1-2 weeks if your symptoms are still present for further evaluation

## 2025-02-06 NOTE — ED PROVIDER NOTES
Encounter Date: 2025       History     Chief Complaint   Patient presents with    Knee Pain     Pt states she fell out of her bed 2 weeks ago and is still having rt knee pain.     See MDM    The history is provided by the patient. No  was used.     Review of patient's allergies indicates:   Allergen Reactions    Codeine Rash     Past Medical History:   Diagnosis Date    Anxiety     Arm heaviness     COPD (chronic obstructive pulmonary disease)     Depression     Gait abnormality     GERD (gastroesophageal reflux disease)     HLD (hyperlipidemia)     Hypertension     Insomnia     Migraines     Obesity     Stenosis of cervical spine with myelopathy      Past Surgical History:   Procedure Laterality Date    Cervical Cyrotherapy       SECTION       SECTION, CLASSIC       SECTION, CLASSIC      CHOLECYSTECTOMY      COLONOSCOPY      DECOMPRESSION OF CERVICAL SPINE BY POSTERIOR APPROACH N/A 2024    C3-4, C4-5, C5-6.  Dr. Harding    ESOPHAGOGASTRODUODENOSCOPY      HYSTEROSCOPY WITH DILATION AND CURETTAGE OF UTERUS N/A 2024    Procedure: HYSTEROSCOPY, WITH DILATION AND CURETTAGE OF UTERUS;  Surgeon: Brady Rosario MD;  Location: [facility] OR;  Service: OB/GYN;  Laterality: N/A;    TONSILLECTOMY       Family History   Problem Relation Name Age of Onset    Cancer Father      Heart disease Father      Heart disease Mother      Kidney disease Sister      Breast cancer Neg Hx      Colon cancer Neg Hx      Uterine cancer Neg Hx      Cervical cancer Neg Hx      Ovarian cancer Neg Hx       Social History     Tobacco Use    Smoking status: Former     Current packs/day: 0.00     Types: Cigarettes     Quit date: 2024     Years since quittin.7    Smokeless tobacco: Never   Substance Use Topics    Alcohol use: Never    Drug use: Never     Review of Systems   Musculoskeletal:  Positive for joint swelling (knee pain (right)).   All other systems reviewed and are  negative.      Physical Exam     Initial Vitals [02/06/25 1437]   BP Pulse Resp Temp SpO2   129/79 71 18 98.6 °F (37 °C) 97 %      MAP       --         Physical Exam    Nursing note and vitals reviewed.  Constitutional: She appears well-developed and well-nourished.   Cardiovascular:  Intact distal pulses.           Pulmonary/Chest: No respiratory distress.   Musculoskeletal:      Right knee: Swelling present. Decreased range of motion. Tenderness present.      Comments: Limited ROM due to pain. Ambulatory with slight limp     Neurological: She is alert and oriented to person, place, and time.   Skin: Skin is warm and dry.   Psychiatric: She has a normal mood and affect.         ED Course   Procedures  Labs Reviewed - No data to display       Imaging Results              X-Ray Knee 3 View Right (Final result)  Result time 02/06/25 15:02:55      Final result by Estuardo Serrano MD (02/06/25 15:02:55)                   Impression:      Normal right knee radiographs.      Electronically signed by: Estuardo Serrano  Date:    02/06/2025  Time:    15:02               Narrative:    EXAMINATION:  XR KNEE 3 VIEW RIGHT    CLINICAL HISTORY:  Unspecified fall, initial encounter    TECHNIQUE:  AP, lateral, and Merchant views of the right knee were performed.    COMPARISON:  None    FINDINGS:  There is no evidence of acute fracture or dislocation.    The tibiofemoral joint space is well maintained.    The patellofemoral joint is well maintained.    There is no joint effusion.    The soft tissues are unremarkable.                                       Medications - No data to display  Medical Decision Making  54-year-old female presents with right knee pain for about 2 weeks and it has now started to swell on her intermittently.  She states initially she fell out of bed but she did not really think anything of it but now she thinks maybe she hurt it more so than she thinks.  Taking ibuprofen for the pain.    X-ray negative.  Will  right knee with Ace wrap.  Encouraged her to continue with the ibuprofen we will just prescribe diclofenac topical as well and a couple of Norco goes for more severe pain.  Encouraged her to follow up with her primary care provider if her symptoms are still present in 1 week for possible other evaluation (imaging)    Amount and/or Complexity of Data Reviewed  Radiology: ordered. Decision-making details documented in ED Course.      Additional MDM:   Differential Diagnosis:   Other: The following diagnoses were also considered and will be evaluated: knee effusion, knee contusion and knee fracture.                                   Clinical Impression:  Final diagnoses:  [W19.XXXA] Fall  [M25.561] Acute pain of right knee (Primary)          ED Disposition Condition    Discharge Stable          ED Prescriptions       Medication Sig Dispense Start Date End Date Auth. Provider    diclofenac sodium (VOLTAREN) 1 % Gel Apply 2 g topically 4 (four) times daily. 50 g 2/6/2025 -- Michelle Dowd FNP    traMADoL (ULTRAM) 50 mg tablet Take 1 tablet (50 mg total) by mouth every 12 (twelve) hours as needed for Pain. 6 tablet 2/6/2025 2/9/2025 Michelle Dowd FNP          Follow-up Information       Follow up With Specialties Details Why Contact Info    Lisa Yang FNP Family Medicine Call in 1 week As needed [facility]   [address] [place] [id number]  [phone]               Michelle Dowd FNP  02/06/25 2022

## 2025-02-06 NOTE — Clinical Note
"Brie Ewing (Jodi)t was seen and treated in our emergency department on 2/6/2025.  She may return to work on 02/07/2025.       If you have any questions or concerns, please don't hesitate to call.       LPN    "

## 2025-02-26 ENCOUNTER — TELEPHONE (OUTPATIENT)
Facility: CLINIC | Age: 55
End: 2025-02-26
Payer: COMMERCIAL

## 2025-05-05 NOTE — PROGRESS NOTES
Chief Complaint     Follow-up (6m f/u :hysteroscope with dilation and curettage on 2024 due to PMB, thickened Endometrium //Denies any bleeding since last visit//LP(24)NIL -gc/ cz /tv/ hr hpv/LM(23)-Benign/)    HPI:     Patient is a 54 y.o.  presents today for 6m f/u :hysteroscope with dilation and curettage on 2024 due to PMB, thickened Endometrium. Denies vaginal bleeding since last visit. C/o hot flashes. Hx of elevated liver enzymes, Hx of tobacco use. Has tried paroxetine in past for a separate issue and had untenable side effects.      Pathology 24:  Endometrium curettage:  Benign polypoid endometrium and smooth muscle,    The benign smooth muscle proliferation may represent benign myometrial tissue, adenomyosis, or leiomyomata.  No malignancy identified   Endocervix curettage:  Fragments of benign endocervical glandular epithelium and stroma   Benign detached squamous epithelium   No malignancy identified    LP(24)NIL -gc/ cz /tv/ hr hpv      Gyn History:     Menstrual History  Cycle: No  Menarche Age: 1 years     Menopause  Menopause Age: 43 years  Post Menopausal Bleeding: No  Hormone Replacement Therapy: No     Pap History  Last pap date: 24  Result: Normal  History of Abnormal Pap: (!) Yes  Treatment used:  (Cryo)  HPV Vaccine Completed: No     [place]  Sexually Active: Yes  Sexual Orientation: heterosexual  Postcoital Bleeding: No  Dyspareunia: No  STI History: No  Contraception: No     Breast History  Last Breast Imaging Date: Yes  Date: 23  History of Abnormal Breast Imaging : (!) Yes (DX MMG WNL)  History of Breast Biopsy: No          Past Medical History:   Diagnosis Date    Anxiety     Arm heaviness     COPD (chronic obstructive pulmonary disease)     Depression     Gait abnormality     GERD (gastroesophageal reflux disease)     HLD (hyperlipidemia)     Hypertension     Insomnia     Migraines     Obesity     Stenosis of cervical spine with  "myelopathy        Past Surgical History:   Procedure Laterality Date    Cervical Cyrotherapy       SECTION       SECTION, CLASSIC       SECTION, CLASSIC      CHOLECYSTECTOMY      COLONOSCOPY      DECOMPRESSION OF CERVICAL SPINE BY POSTERIOR APPROACH N/A 2024    C3-4, C4-5, C5-6.  Dr. Harding    ESOPHAGOGASTRODUODENOSCOPY      HYSTEROSCOPY WITH DILATION AND CURETTAGE OF UTERUS N/A 2024    Procedure: HYSTEROSCOPY, WITH DILATION AND CURETTAGE OF UTERUS;  Surgeon: Brady Rosario MD;  Location: [facility] OR;  Service: OB/GYN;  Laterality: N/A;    TONSILLECTOMY         Family History   Problem Relation Name Age of Onset    Cancer Father      Heart disease Father      Heart disease Mother      Kidney disease Sister      Breast cancer Neg Hx      Colon cancer Neg Hx      Uterine cancer Neg Hx      Cervical cancer Neg Hx      Ovarian cancer Neg Hx         OB History          3    Para   3    Term   3            AB        Living   3         SAB        IAB        Ectopic        Multiple        Live Births   3                 Medications Ordered Prior to Encounter[1]    Review of Systems:       Review of Systems   Constitutional:  Negative for chills and fever.   Gastrointestinal:  Negative for abdominal pain, constipation and diarrhea.   Genitourinary:  Negative for bladder incontinence, decreased libido, dysmenorrhea, dyspareunia, dysuria, flank pain, frequency, genital sores, hematuria, hot flashes, menorrhagia, menstrual problem, pelvic pain, urgency, vaginal bleeding, vaginal discharge, vaginal pain, urinary incontinence, postcoital bleeding, postmenopausal bleeding, vaginal dryness and vaginal odor.        Physical Exam:    /78 (BP Location: Right arm, Patient Position: Sitting)   Ht 5' 4" (1.626 m)   Wt 76.2 kg (168 lb)   BMI 28.84 kg/m²     Physical Exam   General Exam:    General Appearance: alert, in no acute distress, normal, well nourished.  Psych:  Orientation: time, " place, person.  Mood/Affect: Normal       Assessment:   1. PMB (postmenopausal bleeding)    2. Hot flashes    3. Tobacco use    4. Elevated liver enzymes             Plan:     She has had no further bleeding since last appointment.  Recommend continuing to monitor for now: no further treatment warranted currently  If bleeding recurs we will consider further management at that time  No further workup needed at this time. To call if bleeding to occur    Discussed hot flashes, menopausal symptoms and hormone replacement therapy.  Reviewed self-help strategies (dietary changes/exercise/accupuncture/etc.), herbal and other OTC therapies, hormonal options as well as other medications used to treat common menopausal symptoms. Layered clothing, fans.   Discussed risks of HRT including but not limited to: Deep vein thrombosis, pulmonary emboli, stroke, increased cancer risk, etc.  Also discussed alternatives such as Paroxetine, Clonidine, non hormonal medications such as Veozah.    The patient has history of paroxetine use for which she did not tolerate the medication.  We also discussed potential for using Lexapro.  She would like to avoid these for now.    We discussed elevated liver enzymes and a contraindication to Victoza use.  Recommend completing workup for elevated liver enzymes.  If liver enzymes are less than 2 times normal in workup otherwise negative then this may be an option in the future.      After a thorough discussion of risks and benefits of each treatment modality.  Patient says she would like to try healthy diet and exercise for now    Smoking cessation    RTC prn/annual     This note was transcribed by Meghna Ayala. There may be transcription errors as a result, however minimal. Effort has been made to ensure accuracy of transcription, but any obvious errors or omissions should be clarified with the author of the document.       I agree with the above documentation.            [1]   Current Outpatient  Medications on File Prior to Visit   Medication Sig Dispense Refill    albuterol (PROVENTIL/VENTOLIN HFA) 90 mcg/actuation inhaler Inhale 2 puffs into the lungs every 4 (four) hours as needed for Shortness of Breath or Wheezing.      atorvastatin (LIPITOR) 40 MG tablet Take 40 mg by mouth every evening.      carvediloL (COREG) 25 MG tablet Take 25 mg by mouth 2 (two) times daily.      diclofenac sodium (VOLTAREN) 1 % Gel Apply 2 g topically 4 (four) times daily. 50 g 0    ergocalciferol (ERGOCALCIFEROL) 50,000 unit Cap Take 50,000 Units by mouth 3 (three) times a week.      hydrALAZINE (APRESOLINE) 25 MG tablet Take 25 mg by mouth 3 (three) times daily.      hydroCHLOROthiazide (MICROZIDE) 12.5 mg capsule Take 12.5 mg by mouth every morning.      hydrOXYzine pamoate (VISTARIL) 25 MG Cap Take 25 mg by mouth 2 (two) times daily. prn      omeprazole (PRILOSEC) 40 MG capsule Take 40 mg by mouth every morning.      ondansetron (ZOFRAN-ODT) 4 MG TbDL Take 4 mg by mouth.      QUEtiapine (SEROQUEL) 50 MG tablet Take 50 mg by mouth every evening.      sertraline (ZOLOFT) 100 MG tablet Take 100 mg by mouth every evening.      sumatriptan (IMITREX) 100 MG tablet Take 100 mg by mouth daily as needed.      ciprofloxacin HCl (CIPRO) 500 MG tablet Take 500 mg by mouth 2 (two) times daily. (Patient not taking: Reported on 5/6/2025)      ibuprofen (ADVIL,MOTRIN) 800 MG tablet Take 1 tablet (800 mg total) by mouth 2 (two) times daily as needed for Pain. (Patient not taking: Reported on 5/6/2025) 60 tablet 0    methadone (DOLOPHINE) 10 mg/mL solution Take 9.5 mLs (95 mg total) by mouth once daily. for 1 day 1 mL 0    pregabalin (LYRICA) 100 MG capsule Take 1 capsule (100 mg total) by mouth 2 (two) times daily. 60 capsule 0    promethazine (PHENERGAN) 25 MG tablet Take 25 mg by mouth 3 (three) times daily as needed for Nausea. (Patient not taking: Reported on 5/6/2025)       No current facility-administered medications on file prior to  visit.

## 2025-05-06 ENCOUNTER — OFFICE VISIT (OUTPATIENT)
Dept: OBSTETRICS AND GYNECOLOGY | Facility: CLINIC | Age: 55
End: 2025-05-06
Payer: MEDICAID

## 2025-05-06 VITALS
WEIGHT: 168 LBS | DIASTOLIC BLOOD PRESSURE: 78 MMHG | BODY MASS INDEX: 28.68 KG/M2 | SYSTOLIC BLOOD PRESSURE: 126 MMHG | HEIGHT: 64 IN

## 2025-05-06 DIAGNOSIS — Z72.0 TOBACCO USE: ICD-10-CM

## 2025-05-06 DIAGNOSIS — N95.0 PMB (POSTMENOPAUSAL BLEEDING): Primary | ICD-10-CM

## 2025-05-06 DIAGNOSIS — R23.2 HOT FLASHES: ICD-10-CM

## 2025-05-06 DIAGNOSIS — R74.8 ELEVATED LIVER ENZYMES: ICD-10-CM

## 2025-05-06 PROCEDURE — 99214 OFFICE O/P EST MOD 30 MIN: CPT | Mod: ,,, | Performed by: OBSTETRICS & GYNECOLOGY

## 2025-05-06 PROCEDURE — 3008F BODY MASS INDEX DOCD: CPT | Mod: CPTII,,, | Performed by: OBSTETRICS & GYNECOLOGY

## 2025-05-06 PROCEDURE — 3074F SYST BP LT 130 MM HG: CPT | Mod: CPTII,,, | Performed by: OBSTETRICS & GYNECOLOGY

## 2025-05-06 PROCEDURE — 3078F DIAST BP <80 MM HG: CPT | Mod: CPTII,,, | Performed by: OBSTETRICS & GYNECOLOGY

## 2025-05-06 PROCEDURE — 1159F MED LIST DOCD IN RCRD: CPT | Mod: CPTII,,, | Performed by: OBSTETRICS & GYNECOLOGY

## 2025-05-08 ENCOUNTER — HOSPITAL ENCOUNTER (OUTPATIENT)
Dept: RADIOLOGY | Facility: HOSPITAL | Age: 55
Discharge: HOME | End: 2025-05-08
Attending: NURSE PRACTITIONER
Payer: MEDICAID

## 2025-05-08 DIAGNOSIS — Z12.31 ENCOUNTER FOR SCREENING MAMMOGRAM FOR MALIGNANT NEOPLASM OF BREAST: ICD-10-CM

## 2025-05-08 PROCEDURE — 77067 SCR MAMMO BI INCL CAD: CPT | Mod: TC

## 2025-07-16 ENCOUNTER — HOSPITAL ENCOUNTER (EMERGENCY)
Facility: HOSPITAL | Age: 55
Discharge: HOME | End: 2025-07-16
Attending: STUDENT IN AN ORGANIZED HEALTH CARE EDUCATION/TRAINING PROGRAM
Payer: MEDICAID

## 2025-07-16 VITALS
BODY MASS INDEX: 27.46 KG/M2 | OXYGEN SATURATION: 99 % | HEART RATE: 84 BPM | RESPIRATION RATE: 18 BRPM | TEMPERATURE: 98 F | SYSTOLIC BLOOD PRESSURE: 156 MMHG | WEIGHT: 160 LBS | DIASTOLIC BLOOD PRESSURE: 91 MMHG

## 2025-07-16 DIAGNOSIS — R14.0 ABDOMINAL BLOATING: Primary | ICD-10-CM

## 2025-07-16 LAB
ABS NEUT (OLG): 3.84 X10(3)/MCL (ref 2.1–9.2)
ALBUMIN SERPL-MCNC: 3.6 G/DL (ref 3.5–5)
ALBUMIN/GLOB SERPL: 0.8 RATIO (ref 1.1–2)
ALP SERPL-CCNC: 126 UNIT/L (ref 40–150)
ALT SERPL-CCNC: 38 UNIT/L (ref 0–55)
ANION GAP SERPL CALC-SCNC: 10 MEQ/L
APTT PPP: 25.5 SECONDS (ref 23.2–33.7)
AST SERPL-CCNC: 29 UNIT/L (ref 11–45)
BACTERIA #/AREA URNS AUTO: NORMAL /HPF
BASOPHILS NFR BLD MANUAL: 0.18 X10(3)/MCL (ref 0–0.2)
BASOPHILS NFR BLD MANUAL: 2 %
BILIRUB SERPL-MCNC: 0.5 MG/DL
BILIRUB UR QL STRIP.AUTO: NEGATIVE
BUN SERPL-MCNC: 16.1 MG/DL (ref 9.8–20.1)
CALCIUM SERPL-MCNC: 9.2 MG/DL (ref 8.4–10.2)
CHLORIDE SERPL-SCNC: 99 MMOL/L (ref 98–107)
CLARITY UR: CLEAR
CO2 SERPL-SCNC: 28 MMOL/L (ref 22–29)
COLOR UR AUTO: NORMAL
CREAT SERPL-MCNC: 0.85 MG/DL (ref 0.55–1.02)
CREAT/UREA NIT SERPL: 19
EOSINOPHIL NFR BLD MANUAL: 0.18 X10(3)/MCL (ref 0–0.9)
EOSINOPHIL NFR BLD MANUAL: 2 %
ERYTHROCYTE [DISTWIDTH] IN BLOOD BY AUTOMATED COUNT: 15.1 % (ref 11.5–17)
GFR SERPLBLD CREATININE-BSD FMLA CKD-EPI: >60 ML/MIN/1.73/M2
GLOBULIN SER-MCNC: 4.3 GM/DL (ref 2.4–3.5)
GLUCOSE SERPL-MCNC: 98 MG/DL (ref 74–100)
GLUCOSE UR QL STRIP: NORMAL
HCT VFR BLD AUTO: 43.9 % (ref 37–47)
HGB BLD-MCNC: 14.2 G/DL (ref 12–16)
HGB UR QL STRIP: NEGATIVE
INR PPP: 0.9
INSTRUMENT WBC (OLG): 8.94 X10(3)/MCL
KETONES UR QL STRIP: NEGATIVE
LEUKOCYTE ESTERASE UR QL STRIP: NEGATIVE
LYMPHOCYTES NFR BLD MANUAL: 4.47 X10(3)/MCL (ref 0.6–4.6)
LYMPHOCYTES NFR BLD MANUAL: 50 %
MCH RBC QN AUTO: 28.4 PG (ref 27–31)
MCHC RBC AUTO-ENTMCNC: 32.3 G/DL (ref 33–36)
MCV RBC AUTO: 87.8 FL (ref 80–94)
MONOCYTES NFR BLD MANUAL: 0.36 X10(3)/MCL (ref 0.1–1.3)
MONOCYTES NFR BLD MANUAL: 4 %
NEUTROPHILS NFR BLD MANUAL: 43 %
NITRITE UR QL STRIP: NEGATIVE
PH UR STRIP: 7 [PH]
PLATELET # BLD AUTO: 405 X10(3)/MCL (ref 130–400)
PLATELET # BLD EST: NORMAL 10*3/UL
PMV BLD AUTO: 8.5 FL (ref 7.4–10.4)
POTASSIUM SERPL-SCNC: 3.8 MMOL/L (ref 3.5–5.1)
PROT SERPL-MCNC: 7.9 GM/DL (ref 6.4–8.3)
PROT UR QL STRIP: NEGATIVE
PROTHROMBIN TIME: 12.5 SECONDS (ref 12.5–14.5)
RBC # BLD AUTO: 5 X10(6)/MCL (ref 4.2–5.4)
RBC #/AREA URNS AUTO: NORMAL /HPF
RBC MORPH BLD: NORMAL
SODIUM SERPL-SCNC: 137 MMOL/L (ref 136–145)
SP GR UR STRIP.AUTO: 1.02 (ref 1–1.03)
SQUAMOUS #/AREA URNS LPF: NORMAL /HPF
UROBILINOGEN UR STRIP-ACNC: NORMAL
WBC # BLD AUTO: 8.94 X10(3)/MCL (ref 4.5–11.5)
WBC #/AREA URNS AUTO: NORMAL /HPF

## 2025-07-16 PROCEDURE — 25000003 PHARM REV CODE 250: Performed by: STUDENT IN AN ORGANIZED HEALTH CARE EDUCATION/TRAINING PROGRAM

## 2025-07-16 PROCEDURE — 99285 EMERGENCY DEPT VISIT HI MDM: CPT | Mod: 25

## 2025-07-16 PROCEDURE — 85025 COMPLETE CBC W/AUTO DIFF WBC: CPT | Performed by: STUDENT IN AN ORGANIZED HEALTH CARE EDUCATION/TRAINING PROGRAM

## 2025-07-16 PROCEDURE — 96360 HYDRATION IV INFUSION INIT: CPT

## 2025-07-16 PROCEDURE — 80053 COMPREHEN METABOLIC PANEL: CPT | Performed by: STUDENT IN AN ORGANIZED HEALTH CARE EDUCATION/TRAINING PROGRAM

## 2025-07-16 PROCEDURE — 85610 PROTHROMBIN TIME: CPT | Performed by: STUDENT IN AN ORGANIZED HEALTH CARE EDUCATION/TRAINING PROGRAM

## 2025-07-16 PROCEDURE — 25500020 PHARM REV CODE 255: Performed by: STUDENT IN AN ORGANIZED HEALTH CARE EDUCATION/TRAINING PROGRAM

## 2025-07-16 PROCEDURE — 85730 THROMBOPLASTIN TIME PARTIAL: CPT | Performed by: STUDENT IN AN ORGANIZED HEALTH CARE EDUCATION/TRAINING PROGRAM

## 2025-07-16 PROCEDURE — 81001 URINALYSIS AUTO W/SCOPE: CPT | Performed by: STUDENT IN AN ORGANIZED HEALTH CARE EDUCATION/TRAINING PROGRAM

## 2025-07-16 RX ORDER — ADHESIVE BANDAGE
10 BANDAGE TOPICAL
Status: COMPLETED | OUTPATIENT
Start: 2025-07-16 | End: 2025-07-16

## 2025-07-16 RX ADMIN — SODIUM CHLORIDE 1000 ML: 9 INJECTION, SOLUTION INTRAVENOUS at 09:07

## 2025-07-16 RX ADMIN — IOHEXOL 100 ML: 350 INJECTION, SOLUTION INTRAVENOUS at 10:07

## 2025-07-16 RX ADMIN — MAGNESIUM HYDROXIDE 800 MG: 400 SUSPENSION ORAL at 11:07

## 2025-07-16 NOTE — DISCHARGE INSTRUCTIONS
Thanks for letting us take care of you today!  It is our goal to give you courteous care and to keep you comfortable and informed, if you have any questions before you leave I will be happy to try and answer them.    Here is some advice after your visit:    Your visit in the emergency department is NOT definitive care - please follow-up with your primary care doctor and/or specialist within 1-2 days. Please return to the emergency department if you develop worsening symptoms including: fever, chills, chest pain, shortness of breath, weakness, numbness, tingling, nausea, vomiting, inability to eat, drink, or take your medication. Please return if you have any worsening in your condition or if you have any other concerns.    If you had radiology exams like an XRAY or CT in the emergency Department the interpreation on them may be preliminary - there may be less time sensitive findings on the reports please obtain these reports within 24 hours from the hospital or by using your out on your mobile phone to access records.  Bring these to your primary care doctor and/or specialist for further review of incidental findings.    Please review any LAB WORK from your visit today with your primary care physician.    Please be sure to stay hydrated.  Recommended trying MiraLax daily for the next 4 or 5 days.  May also consider increasing your fiber intake.

## 2025-07-16 NOTE — ED PROVIDER NOTES
Encounter Date: 2025    SCRIBE #1 NOTE: I, Cecelia Del Valle, am scribing for, and in the presence of,  Jose De Los Santos MD. I have scribed the following portions of the note - Other sections scribed: HPI, ROS, PE.       History     Chief Complaint   Patient presents with    Constipation     Constipation , last BM 7/10 after using fleet enema. Pt said that she feels bloated and  used another enema last night. Denies abdominal pain/vomiting.      Patient is a 54 year old female with a history of anxiety, COPD, depression, GERD, HLD, HTN, and stenosis of cervical spine presenting to the ED with constipation that started 2 weeks ago. Patient states she was straining with a suppository, but was able to pass a small amount. Patient states her last bowel movement was a week ago. Patient states she tried taking a suppository last night, but was not able to have a bowel movement. Patient states she's not been able to eat because she feels like something is stuck at the top of her stomach. Patient reports she is still able to pass gas.Patient states she has been drinking plenty of water to help. Patient denies abdominal pain, but complains of abdominal distention. Patient reports she has had her gallbladder removed.      The history is provided by the patient and medical records.     Review of patient's allergies indicates:   Allergen Reactions    Codeine Rash     Past Medical History:   Diagnosis Date    Anxiety     Arm heaviness     COPD (chronic obstructive pulmonary disease)     Depression     Gait abnormality     GERD (gastroesophageal reflux disease)     HLD (hyperlipidemia)     Hypertension     Insomnia     Migraines     Obesity     Stenosis of cervical spine with myelopathy      Past Surgical History:   Procedure Laterality Date    Cervical Cyrotherapy       SECTION       SECTION, CLASSIC       SECTION, CLASSIC      CHOLECYSTECTOMY      COLONOSCOPY      DECOMPRESSION OF CERVICAL SPINE BY  POSTERIOR APPROACH N/A 05/13/2024    C3-4, C4-5, C5-6.  Dr. Harding    ESOPHAGOGASTRODUODENOSCOPY      HYSTEROSCOPY WITH DILATION AND CURETTAGE OF UTERUS N/A 9/27/2024    Procedure: HYSTEROSCOPY, WITH DILATION AND CURETTAGE OF UTERUS;  Surgeon: Brady Rosario MD;  Location: [facility] OR;  Service: OB/GYN;  Laterality: N/A;    TONSILLECTOMY       Family History   Problem Relation Name Age of Onset    Cancer Father      Heart disease Father      Heart disease Mother      Kidney disease Sister      Breast cancer Neg Hx      Colon cancer Neg Hx      Uterine cancer Neg Hx      Cervical cancer Neg Hx      Ovarian cancer Neg Hx       Social History[1]  Review of Systems   Gastrointestinal:  Positive for abdominal distention and constipation. Negative for abdominal pain.       Physical Exam     Initial Vitals [07/16/25 0737]   BP Pulse Resp Temp SpO2   (!) 156/91 84 18 98 °F (36.7 °C) 99 %      MAP       --         Physical Exam    Nursing note and vitals reviewed.  Constitutional: She appears well-developed and well-nourished. She is not diaphoretic.   HENT:   Head: Normocephalic and atraumatic.   Right Ear: External ear normal.   Left Ear: External ear normal.   Nose: Nose normal.   Eyes: Conjunctivae and EOM are normal. Pupils are equal, round, and reactive to light. Right eye exhibits no discharge. Left eye exhibits no discharge.   Cardiovascular:  Normal rate, regular rhythm, normal heart sounds and intact distal pulses.     Exam reveals no gallop and no friction rub.       No murmur heard.  Pulmonary/Chest: Breath sounds normal. No respiratory distress. She has no wheezes. She has no rhonchi. She has no rales. She exhibits no tenderness.   Abdominal: Abdomen is soft. Bowel sounds are normal. She exhibits distension. She exhibits no mass. There is no abdominal tenderness.   Palpable mass to the epigastric and RUQ firm non-tender.  There is no rebound and no guarding.   Musculoskeletal:         General: No edema. Normal range of  motion.     Neurological: She is alert and oriented to person, place, and time. No cranial nerve deficit or sensory deficit.   Skin: Skin is warm and dry. Capillary refill takes less than 2 seconds. No erythema. No pallor.         ED Course   Procedures  Labs Reviewed   COMPREHENSIVE METABOLIC PANEL - Abnormal       Result Value    Sodium 137      Potassium 3.8      Chloride 99      CO2 28      Glucose 98      Blood Urea Nitrogen 16.1      Creatinine 0.85      Calcium 9.2      Protein Total 7.9      Albumin 3.6      Globulin 4.3 (*)     Albumin/Globulin Ratio 0.8 (*)     Bilirubin Total 0.5            ALT 38      AST 29      eGFR >60      Anion Gap 10.0      BUN/Creatinine Ratio 19     CBC WITH DIFFERENTIAL - Abnormal    WBC 8.94      RBC 5.00      Hgb 14.2      Hct 43.9      MCV 87.8      MCH 28.4      MCHC 32.3 (*)     RDW 15.1      Platelet 405 (*)     MPV 8.5     APTT - Normal    PTT 25.5     PROTIME-INR - Normal    PT 12.5      INR 0.9      Narrative:     Protimes are used to monitor anticoagulant agents such as warfarin. PT INR values are based on the current patient normal mean and the ABEL value for the specific instrument reagent used.  **Routine theraputic target values for the INR are 2.0-3.0**   URINALYSIS, REFLEX TO URINE CULTURE - Normal    Color, UA Light-Yellow      Appearance, UA Clear      Specific Gravity, UA 1.021      pH, UA 7.0      Protein, UA Negative      Glucose, UA Normal      Ketones, UA Negative      Blood, UA Negative      Bilirubin, UA Negative      Urobilinogen, UA Normal      Nitrites, UA Negative      Leukocyte Esterase, UA Negative      RBC, UA None Seen      WBC, UA 0-5      Bacteria, UA None Seen      Squamous Epithelial Cells, UA Trace     CBC W/ AUTO DIFFERENTIAL    Narrative:     The following orders were created for panel order CBC auto differential.  Procedure                               Abnormality         Status                     ---------                                -----------         ------                     CBC with Differential[[id number]]       Abnormal            Final result               Manual Differential[[id number]]                             Final result                 Please view results for these tests on the individual orders.   MANUAL DIFFERENTIAL    WBC 8.94      Neutrophils % 43      Lymphs % 50      Monocytes % 4      Eosinophils % 2      Basophils % 2      Neutrophils Abs 3.8442      Lymphs Abs 4.47      Monocytes Abs 0.3576      Eosinophils Abs 0.1788      Basophils Abs 0.1788      Platelets Normal      RBC Morph Normal            Imaging Results              CT Abdomen Pelvis With IV Contrast NO Oral Contrast (Final result)  Result time 07/16/25 11:00:55      Final result by Surjit Dial MD (07/16/25 11:00:55)                   Impression:      No abnormality seen      Electronically signed by: Jad Dial  Date:    07/16/2025  Time:    11:00               Narrative:    EXAMINATION:  CT ABDOMEN PELVIS WITH IV CONTRAST    CLINICAL HISTORY:  Bowel obstruction suspected;    TECHNIQUE:  Low dose axial images, sagittal and coronal reformations were obtained from the lung bases to the pubic symphysis following the IV administration of contrast. Automatic exposure control (AEC) is utilized to reduce patient radiation exposure.    COMPARISON:  None.    FINDINGS:  The lung bases are clear.    The liver appears normal.  No liver mass or lesion is seen.  Portal and hepatic veins appear normal.    The patient is status post cholecystectomy.    The pancreas appears normal.  No pancreatic mass or lesion is seen.    The spleen shows no acute abnormality.    The adrenal glands appear normal.  No adrenal nodule is seen.    The kidneys appear normal.  No hydronephrosis is seen.  No hydroureter is seen.  No nephrolithiasis is seen.  No obvious ureteral stones are seen.    Urinary bladder appears grossly unremarkable.    No colitis is seen.  No  diverticulitis is seen.  No obvious colonic mass or lesion is seen.  No evidence of bowel obstruction seen    No free air is seen.  No free fluid is seen.                                       Medications   sodium chloride 0.9% bolus 1,000 mL 1,000 mL (0 mLs Intravenous Stopped 7/16/25 1037)   iohexoL (OMNIPAQUE 350) injection 100 mL (100 mLs Intravenous Given 7/16/25 1047)   magnesium hydroxide 400 mg/5 ml suspension 800 mg (800 mg Oral Given 7/16/25 1123)     Medical Decision Making  The differential diagnosis includes, but is not limited to, appendicitis, biliary disease, diverticulitis,  AAA, ACS, mesenteric ischemia, intraabdominal abcess, retroperitoneal abcess, gastritis, gastroenteritis, hepatitis, hernia, pancreatitis, inflammatory bowel disease, PUD, SBP, nephrolithiasis, DKA,  consitpation, GERD, IBS    See ED course for MDM    Amount and/or Complexity of Data Reviewed  External Data Reviewed: notes.     Details: See ED course  Labs: ordered. Decision-making details documented in ED Course.  Radiology: ordered and independent interpretation performed.    Risk  OTC drugs.  Prescription drug management.            Scribe Attestation:   Scribe #1: I performed the above scribed service and the documentation accurately describes the services I performed. I attest to the accuracy of the note.    Attending Attestation:           Physician Attestation for Scribe:  Physician Attestation Statement for Scribe #1: I, Jose De Los Santos MD, reviewed documentation, as scribed by Cecelia Del Valle in my presence, and it is both accurate and complete.             ED Course as of 07/16/25 1550   Wed Jul 16, 2025   1013 Comprehensive metabolic panel(!)  No significant electrolyte abnormality or renal dysfunction [MM]   1013 CBC auto differential(!)  No leukocytosis or anemia [MM]   1123 Urinalysis, Reflex to Urine Culture Urine, Clean Catch  Neg for UTI   [MM]   1142 On re-evaluation patient is resting comfortably.  NAD.   Discussed unrevealing lab workup.  Negative urinalysis.  Reassuring physical exam.  Her abdomen is soft and nontender.  That has CT scan does not show any acute process no obvious small bowel obstruction.  No fecal impaction.  In fact that has not much stool in the rectum noted by me.  I believe that has time she is suitable for discharge home discussed findings with the patient, family room they are comfortable with the plan.  She is encouraged to stay hydrated as she had reports she does not drink much water.  Possibly increase her fiber intake in his try MiraLax for several days to see if this helps.  Patient seems comfortable with the plan that as does family. Return precautions given.  Questions invited, questions answered to the best my ability.  Patient discharged home condition stable.   [MM]   1549 CT Abdomen Pelvis With IV Contrast NO Oral Contrast  Negative for acute no obvious stranding infiltration dilated loops of bowel [MM]      ED Course User Index  [MM] Jose De Los Santos MD                               Clinical Impression:  Final diagnoses:  [R14.0] Abdominal bloating (Primary)          ED Disposition Condition    Discharge Stable          ED Prescriptions    None       Follow-up Information       Follow up With Specialties Details Why Contact Info    Lisa Yang FNP Family Medicine Call   [address] [id number] [phone]                     [1]   Social History  Tobacco Use    Smoking status: Former     Current packs/day: 0.00     Types: Cigarettes     Quit date: 2024     Years since quittin.2    Smokeless tobacco: Never   Substance Use Topics    Alcohol use: Never    Drug use: Never        Jose De Los Santos MD  25 1998

## 2025-08-20 ENCOUNTER — HOSPITAL ENCOUNTER (EMERGENCY)
Facility: HOSPITAL | Age: 55
Discharge: HOME | End: 2025-08-20
Attending: INTERNAL MEDICINE
Payer: MEDICAID

## 2025-08-20 VITALS
BODY MASS INDEX: 28.17 KG/M2 | SYSTOLIC BLOOD PRESSURE: 139 MMHG | WEIGHT: 165 LBS | HEART RATE: 55 BPM | OXYGEN SATURATION: 98 % | DIASTOLIC BLOOD PRESSURE: 76 MMHG | TEMPERATURE: 98 F | RESPIRATION RATE: 18 BRPM | HEIGHT: 64 IN

## 2025-08-20 DIAGNOSIS — S22.010A CLOSED WEDGE COMPRESSION FRACTURE OF T1 VERTEBRA, INITIAL ENCOUNTER: Primary | ICD-10-CM
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
## 2025-08-20 PROCEDURE — 25000003 PHARM REV CODE 250

## 2025-08-20 PROCEDURE — 99283 EMERGENCY DEPT VISIT LOW MDM: CPT | Mod: 25

## 2025-08-20 RX ORDER — IBUPROFEN 400 MG/1
800 TABLET, FILM COATED ORAL
Status: COMPLETED | OUTPATIENT
Start: 2025-08-20 | End: 2025-08-20

## 2025-08-20 RX ORDER — IBUPROFEN 600 MG/1
600 TABLET, FILM COATED ORAL
Status: DISCONTINUED | OUTPATIENT
Start: 2025-08-20 | End: 2025-08-20

## 2025-08-20 RX ADMIN — IBUPROFEN 800 MG: 400 TABLET ORAL at 03:08

## 2025-08-21 ENCOUNTER — TELEPHONE (OUTPATIENT)
Dept: NEUROSURGERY | Facility: CLINIC | Age: 55
End: 2025-08-21
Payer: MEDICAID

## 2025-08-21 DIAGNOSIS — S22.010A CLOSED WEDGE COMPRESSION FRACTURE OF T1 VERTEBRA, INITIAL ENCOUNTER: Primary | ICD-10-CM

## 2025-08-21 DIAGNOSIS — M47.812 CERVICAL SPONDYLOSIS: ICD-10-CM

## 2025-09-04 ENCOUNTER — HOSPITAL ENCOUNTER (OUTPATIENT)
Dept: RADIOLOGY | Facility: HOSPITAL | Age: 55
Discharge: HOME | End: 2025-09-04
Attending: NURSE PRACTITIONER
Payer: MEDICAID

## 2025-09-04 ENCOUNTER — HOSPITAL ENCOUNTER (OUTPATIENT)
Dept: RADIOLOGY | Facility: HOSPITAL | Age: 55
Discharge: HOME | End: 2025-09-04
Payer: MEDICAID

## 2025-09-04 DIAGNOSIS — S22.010A CLOSED WEDGE COMPRESSION FRACTURE OF T1 VERTEBRA, INITIAL ENCOUNTER: ICD-10-CM

## 2025-09-04 DIAGNOSIS — M47.812 CERVICAL SPONDYLOSIS: ICD-10-CM

## 2025-09-04 DIAGNOSIS — M47.812 CERVICAL SPONDYLOSIS: Primary | ICD-10-CM

## 2025-09-04 PROCEDURE — 72040 X-RAY EXAM NECK SPINE 2-3 VW: CPT | Mod: TC

## 2025-09-04 PROCEDURE — 72072 X-RAY EXAM THORAC SPINE 3VWS: CPT | Mod: TC

## 2025-09-05 ENCOUNTER — TELEPHONE (OUTPATIENT)
Dept: NEUROSURGERY | Facility: CLINIC | Age: 55
End: 2025-09-05
Payer: MEDICAID

## 2025-09-05 DIAGNOSIS — M62.838 MUSCLE SPASTICITY: Primary | ICD-10-CM

## 2025-09-05 RX ORDER — CYCLOBENZAPRINE HCL 10 MG
10 TABLET ORAL 3 TIMES DAILY PRN
Qty: 90 TABLET | Refills: 0 | Status: SHIPPED | OUTPATIENT
Start: 2025-09-05 | End: 2025-10-05

## (undated) DEVICE — GLOVE PROTEXIS PI SYN SURG 7

## (undated) DEVICE — TRAY CATH FOL SIL URIMTR 16FR

## (undated) DEVICE — SUT VICRYL PLUS 0 CT-1 18IN

## (undated) DEVICE — SPONGE COTTON TRAY 4X4IN

## (undated) DEVICE — GOWN SMARTSLEEVE AAMI LVL4 XL

## (undated) DEVICE — SPONGE PATTY NEURO SGL 0.5X6

## (undated) DEVICE — KIT SURGICAL TURNOVER

## (undated) DEVICE — GOWN POLY REINF BRTH SLV 2XL

## (undated) DEVICE — SUT ETHILON 3-0 FS-1 30

## (undated) DEVICE — KIT SURGIFLO HEMOSTATIC MATRIX

## (undated) DEVICE — TAPE MEDIPORE 3 X 10YD

## (undated) DEVICE — DISH PETRI MED 3.5IN

## (undated) DEVICE — DRAPE SURG W/TWL 17 5/8X23

## (undated) DEVICE — DRESSING AQUACEL AG ADV 3.5X6

## (undated) DEVICE — DRAPE LEGGINGS CUFF 31X48IN

## (undated) DEVICE — DRAPE C-ARM COVER EZ 36X28IN

## (undated) DEVICE — SOL NACL IRR 1000ML BTL

## (undated) DEVICE — GLOVE PROTEXIS PI SYN SURG 8.5

## (undated) DEVICE — TUBING SUCTION CONNECTING 10FT

## (undated) DEVICE — SEAL LENS SCOPE MYOSURE

## (undated) DEVICE — COVER HD BACK TABLE 6FT

## (undated) DEVICE — GLOVE PROTEXIS PI SYN SURG 6.5

## (undated) DEVICE — DRAPE UNDER BUTTOCKS SUC PORT

## (undated) DEVICE — DRAPE TIBURON ORTHOPEDIC SPLIT

## (undated) DEVICE — KIT MAJOR SINGLE BASIN

## (undated) DEVICE — TRAY DRY SKIN SCRUB PREP

## (undated) DEVICE — DRESSING AQUACEL AG ADV 3.5X12

## (undated) DEVICE — SPONGE GAUZE 16PLY 4X4

## (undated) DEVICE — DRESSING TELFA N ADH 3X8IN

## (undated) DEVICE — BLADE EZ CLEAN 2 1/2

## (undated) DEVICE — Device

## (undated) DEVICE — KIT DRAIN WOUND RND SPRNG RESV

## (undated) DEVICE — APPLICATOR CHLORAPREP ORN 26ML

## (undated) DEVICE — SPONGE SURGIFOAM 100 8.5X12X10

## (undated) DEVICE — DEVICE MYOSURE REACH

## (undated) DEVICE — ELECTRODE BLADE E-Z CLEAN 4IN

## (undated) DEVICE — SPONGE LAP 18X18 PREWASHED

## (undated) DEVICE — SET EXT MACROBORE 15IN

## (undated) DEVICE — PAD DERMAPROX XL 22X14X.5IN

## (undated) DEVICE — BUR BONE CUT MICRO TPS 3X3.8MM

## (undated) DEVICE — ADHESIVE MASTISOL VIAL 48/BX

## (undated) DEVICE — TRAY CATH URETHRAL FOLEY 16FR

## (undated) DEVICE — PACK ECLIPSE BASIC III SURG

## (undated) DEVICE — SUT VICRYL 2 0 CT 2

## (undated) DEVICE — JELLY KY LUBRICATING 5G PACKET

## (undated) DEVICE — TUBE AQUILEX VACUUM SET HI LO

## (undated) DEVICE — NDL HYPO 22GX1 1/2 SYR 10ML LL

## (undated) DEVICE — TUBE SUCTION MEDI-VAC STERILE